# Patient Record
Sex: FEMALE | Race: WHITE | NOT HISPANIC OR LATINO | URBAN - METROPOLITAN AREA
[De-identification: names, ages, dates, MRNs, and addresses within clinical notes are randomized per-mention and may not be internally consistent; named-entity substitution may affect disease eponyms.]

---

## 2020-09-14 ENCOUNTER — OFFICE VISIT (OUTPATIENT)
Dept: URBAN - METROPOLITAN AREA SURGERY CENTER 4 | Facility: SURGERY CENTER | Age: 55
End: 2020-09-14

## 2020-09-15 ENCOUNTER — OFFICE VISIT (OUTPATIENT)
Dept: URBAN - METROPOLITAN AREA SURGERY CENTER 4 | Facility: SURGERY CENTER | Age: 55
End: 2020-09-15

## 2020-09-21 LAB — PATHOLOGY (INDENTED REPORT): (no result)

## 2020-10-01 ENCOUNTER — OFFICE VISIT (OUTPATIENT)
Dept: URBAN - METROPOLITAN AREA CLINIC 60 | Facility: CLINIC | Age: 55
End: 2020-10-01

## 2020-12-03 ENCOUNTER — NURSE TRIAGE (OUTPATIENT)
Dept: OTHER | Facility: OTHER | Age: 55
End: 2020-12-03

## 2021-01-18 ENCOUNTER — OFFICE VISIT (OUTPATIENT)
Dept: URGENT CARE | Facility: CLINIC | Age: 56
End: 2021-01-18

## 2021-01-18 VITALS
BODY MASS INDEX: 23.64 KG/M2 | TEMPERATURE: 98.8 F | RESPIRATION RATE: 16 BRPM | HEIGHT: 61 IN | OXYGEN SATURATION: 98 % | WEIGHT: 125.2 LBS | HEART RATE: 84 BPM | DIASTOLIC BLOOD PRESSURE: 72 MMHG | SYSTOLIC BLOOD PRESSURE: 100 MMHG

## 2021-01-18 DIAGNOSIS — R42 VERTIGO: Primary | ICD-10-CM

## 2021-01-18 PROCEDURE — G0382 LEV 3 HOSP TYPE B ED VISIT: HCPCS | Performed by: PHYSICIAN ASSISTANT

## 2021-01-18 RX ORDER — MECLIZINE HYDROCHLORIDE 25 MG/1
25 TABLET ORAL 3 TIMES DAILY PRN
Qty: 30 TABLET | Refills: 0 | Status: SHIPPED | OUTPATIENT
Start: 2021-01-18 | End: 2021-06-23

## 2021-01-18 RX ORDER — PANTOPRAZOLE SODIUM 40 MG/1
40 TABLET, DELAYED RELEASE ORAL DAILY
COMMUNITY
End: 2021-06-23

## 2021-01-18 NOTE — LETTER
January 18, 2021     Patient: Tamara Smith   YOB: 1965   Date of Visit: 1/18/2021       To Whom it May Concern: Chepe Link was seen in my clinic on 1/18/2021  She may return to work on 01/20/2021 or sooner if better  If you have any questions or concerns, please don't hesitate to call           Sincerely,          Farideh Maloney PA-C

## 2021-01-18 NOTE — PATIENT INSTRUCTIONS
Vertigo   AMBULATORY CARE:   Vertigo  is a condition that causes you to feel dizzy  You may feel that you or everything around you is moving or spinning  You may also feel like you are being pulled down or toward your side  Common symptoms that may happen with vertigo:   · Nausea or vomiting    · Trouble with your balance    · Sensitivity to light or sound    · Weakness, slurred speech, problems seeing or moving, or increased sleepiness    · Facial weakness and headache    · Hearing loss, ear fullness or pain, or hearing ringing sounds    · Fast, uncontrolled movement of your eyes    Seek care immediately if:   · You have a headache and a stiff neck  · You have shaking chills and a fever  · You vomit over and over with no relief  · You have blood, pus, or fluid coming out of your ears  · You are confused  Contact your healthcare provider if:   · Your symptoms do not get better with treatment  · You have questions about your condition or care  Treatment for vertigo  may include resting in bed or avoid certain activities for a time  You may need to decrease or stop taking medicines that are causing your vertigo  Medicines may also be prescribed to help relieve your symptoms  Manage your symptoms:   · Do not drive , walk without help, or operate heavy machinery when you are dizzy  · Move slowly  when you move from one position to another position  Get up slowly from sitting or lying down  Sit or lie down right away if you feel dizzy  · Drink plenty of liquids  Liquids help prevent dehydration  Ask how much liquid to drink each day and which liquids are best for you  · Vestibular and balance rehabilitation therapy (VBRT)  is used to teach you exercises to improve your balance and strength  These exercises may help decrease your vertigo and improve your balance  Ask for more information about this therapy      Follow up with your healthcare provider as directed:  Write down your questions so you remember to ask them during your visits  © Copyright [address] Information is for End User's use only and may not be sold, redistributed or otherwise used for commercial purposes  All illustrations and images included in CareNotes® are the copyrighted property of [employer] D [employer] M [employer]  or [employer]  The above information is an  only  It is not intended as medical advice for individual conditions or treatments  Talk to your doctor, nurse or pharmacist before following any medical regimen to see if it is safe and effective for you

## 2021-01-19 NOTE — PROGRESS NOTES
[facility]        NAME: Syd Wilson is a 60 y o  female  : 1965    MRN: [id number]  DATE: 2021  TIME: 6:05 AM    Assessment and Plan   Vertigo [R42]  1  Vertigo  meclizine (ANTIVERT) 25 mg tablet         Patient Instructions       Follow up with PCP in 3-5 days  Proceed to  ER if symptoms worsen  Chief Complaint     Chief Complaint   Patient presents with    Dizziness     onset 5 weeks, allergy symptoms sneezing- coughing  Cold sore, sore on tingue  Right ear has a buzzing  Friday woke up with vertigo feeling, fatigued, decreased energy, nausea  History of Present Illness         68-year-old female presents the clinic with dizziness, sneezing, coughing, so cold sore the side of nose and a buzzing sound to her right ear that started 5 weeks ago  Patient states that she recently moved from [place] and started a new job 5 weeks ago  Patient states she has been having some allergy symptoms and states that she was tested for COVID-19 which was negative  Patient states that she woke up Friday with dizziness and a vertigo feeling which she has had before  Patient is also had some fatigue and some mild intermittent nausea with her dizziness  Patient has taken over-the-counter allergy medicine  Review of Systems   Review of Systems   Constitutional: Positive for fatigue  Negative for chills and fever  HENT: Positive for ear pain (buzzing) and sneezing  Negative for congestion, rhinorrhea and sore throat  No loss of sense of smell or taste   Respiratory: Positive for cough  Negative for chest tightness, shortness of breath and wheezing  Cardiovascular: Negative for chest pain and palpitations  Gastrointestinal: Positive for nausea  Negative for abdominal pain, diarrhea and vomiting  Musculoskeletal: Negative for myalgias  Skin: Negative for rash  Neurological: Positive for dizziness   Negative for weakness, light-headedness, numbness and headaches  All other systems reviewed and are negative  Current Medications       Current Outpatient Medications:     pantoprazole (PROTONIX) 40 mg tablet, Take 40 mg by mouth daily, Disp: , Rfl:     meclizine (ANTIVERT) 25 mg tablet, Take 1 tablet (25 mg total) by mouth 3 (three) times a day as needed for dizziness or nausea, Disp: 30 tablet, Rfl: 0    Current Allergies     Allergies as of 01/18/2021    (No Known Allergies)            The following portions of the patient's history were reviewed and updated as appropriate: allergies, current medications, past family history, past medical history, past social history, past surgical history and problem list      Past Medical History:   Diagnosis Date    Roque's esophagus        Past Surgical History:   Procedure Laterality Date    GASTRIC BYPASS         History reviewed  No pertinent family history  Medications have been verified  Objective   /72   Pulse 84   Temp 98 8 °F (37 1 °C) (Temporal)   Resp 16   Ht 5' 1" (1 549 m)   Wt 56 8 kg (125 lb 3 2 oz)   SpO2 98%   BMI 23 66 kg/m²   No LMP recorded  Physical Exam     Physical Exam  Vitals signs and nursing note reviewed  Constitutional:       General: She is not in acute distress  Appearance: She is well-developed  She is not diaphoretic  HENT:      Head: Normocephalic and atraumatic  Right Ear: A middle ear effusion (  Mild) is present  Tympanic membrane is not erythematous  Left Ear: A middle ear effusion ( mild) is present  Tympanic membrane is not erythematous  Nose: Nose normal       Mouth/Throat:      Lips: Pink  Mouth: Mucous membranes are moist       Pharynx: Uvula midline  Eyes:      Conjunctiva/sclera: Conjunctivae normal    Neck:      Musculoskeletal: Normal range of motion  Cardiovascular:      Rate and Rhythm: Normal rate and regular rhythm  Heart sounds: Normal heart sounds     Pulmonary:      Effort: Pulmonary effort is normal       Breath sounds: Normal breath sounds  Musculoskeletal: Normal range of motion  Skin:     General: Skin is warm and dry  Capillary Refill: Capillary refill takes less than 2 seconds  Neurological:      General: No focal deficit present  Mental Status: She is alert and oriented to person, place, and time  GCS: GCS eye subscore is 4  GCS verbal subscore is 5  GCS motor subscore is 6  Cranial Nerves: Cranial nerves are intact  Sensory: Sensation is intact  Motor: Motor function is intact  Coordination: Coordination is intact  Gait: Gait is intact

## 2021-06-23 ENCOUNTER — OFFICE VISIT (OUTPATIENT)
Dept: FAMILY MEDICINE CLINIC | Facility: CLINIC | Age: 56
End: 2021-06-23
Payer: COMMERCIAL

## 2021-06-23 VITALS
HEIGHT: 61 IN | TEMPERATURE: 97.7 F | SYSTOLIC BLOOD PRESSURE: 112 MMHG | DIASTOLIC BLOOD PRESSURE: 70 MMHG | BODY MASS INDEX: 23.83 KG/M2 | OXYGEN SATURATION: 99 % | HEART RATE: 68 BPM | WEIGHT: 126.2 LBS

## 2021-06-23 DIAGNOSIS — Z13.220 LIPID SCREENING: ICD-10-CM

## 2021-06-23 DIAGNOSIS — Z12.31 SCREENING MAMMOGRAM, ENCOUNTER FOR: ICD-10-CM

## 2021-06-23 DIAGNOSIS — Z13.29 THYROID DISORDER SCREENING: ICD-10-CM

## 2021-06-23 DIAGNOSIS — Z87.19 HISTORY OF BARRETT'S ESOPHAGUS: ICD-10-CM

## 2021-06-23 DIAGNOSIS — Z13.228 ENCOUNTER FOR SCREENING FOR METABOLIC DISORDER: ICD-10-CM

## 2021-06-23 DIAGNOSIS — D50.9 IRON DEFICIENCY ANEMIA, UNSPECIFIED IRON DEFICIENCY ANEMIA TYPE: ICD-10-CM

## 2021-06-23 DIAGNOSIS — R53.83 FATIGUE, UNSPECIFIED TYPE: ICD-10-CM

## 2021-06-23 DIAGNOSIS — Z76.89 ENCOUNTER TO ESTABLISH CARE: Primary | ICD-10-CM

## 2021-06-23 PROCEDURE — 3725F SCREEN DEPRESSION PERFORMED: CPT | Performed by: NURSE PRACTITIONER

## 2021-06-23 PROCEDURE — 99203 OFFICE O/P NEW LOW 30 MIN: CPT | Performed by: NURSE PRACTITIONER

## 2021-06-23 RX ORDER — OMEPRAZOLE 40 MG/1
40 CAPSULE, DELAYED RELEASE ORAL DAILY
Qty: 90 CAPSULE | Refills: 1 | Status: SHIPPED | OUTPATIENT
Start: 2021-06-23 | End: 2022-01-28

## 2021-06-23 NOTE — PATIENT INSTRUCTIONS
Labs as ordered  Prilosec 40 mg daily as directed  Have labs done and schedule appointment for physical   Call with any questions/concerns

## 2021-06-23 NOTE — PROGRESS NOTES
[facility]        NAME: Mani Grande is a 60 y o  female  : 1965    MRN: [id number]  DATE: 2021  TIME: 3:50 PM    Assessment and Plan   Encounter to establish care [Z76 [id number]]  1  Encounter to establish care     2  Encounter for screening for metabolic disorder  Comprehensive metabolic panel    Comprehensive metabolic panel   3  Lipid screening  Lipid panel    Lipid panel   4  Thyroid disorder screening     5  Fatigue, unspecified type  CBC and differential    TSH, 3rd generation with Free T4 reflex    Iron    CBC and differential    TSH, 3rd generation with Free T4 reflex    Iron   6  Iron deficiency anemia, unspecified iron deficiency anemia type     7  Screening mammogram, encounter for  Mammo screening bilateral w 3d & cad   8  History of Roque's esophagus  omeprazole (PriLOSEC) 40 MG capsule         Patient Instructions     Patient Instructions   Labs as ordered  Prilosec 40 mg daily as directed  Have labs done and schedule appointment for physical   Call with any questions/concerns  Chief Complaint     Chief Complaint   Patient presents with    New Patient Visit     establish care & multi complaints         History of Present Illness       New patient to establish care  Moved from [place]  Hx of gastric bypass, janett's esophagus-seen by GI in [place] had endoscopy within last year, takes prilosec needs refill  C/o feeling fatigue, hx of anemia, has received iron infusions in the past  Family hx of thyroid disease  Review of Systems   Review of Systems   Constitutional: Positive for fatigue  Negative for activity change, appetite change, chills, diaphoresis and fever  HENT: Negative for congestion, facial swelling, hearing loss, rhinorrhea, sinus pressure, sinus pain, sneezing, sore throat and voice change  Eyes: Negative for discharge and visual disturbance     Respiratory: Negative for cough, choking, chest tightness, shortness of breath, wheezing and stridor  Cardiovascular: Negative for chest pain, palpitations and leg swelling  Gastrointestinal: Negative for abdominal distention, abdominal pain, blood in stool, constipation, diarrhea, nausea and vomiting  Endocrine: Negative for cold intolerance, polydipsia, polyphagia and polyuria  Genitourinary: Negative for difficulty urinating, dysuria, frequency and urgency  Musculoskeletal: Negative for arthralgias, back pain, gait problem, joint swelling, myalgias, neck pain and neck stiffness  Skin: Negative for color change, pallor, rash and wound  Neurological: Negative for dizziness, tremors, seizures, syncope, facial asymmetry, speech difficulty, weakness, light-headedness, numbness and headaches  Hematological: Negative for adenopathy  Does not bruise/bleed easily  Psychiatric/Behavioral: Negative for agitation, behavioral problems, confusion, decreased concentration, dysphoric mood, hallucinations, self-injury, sleep disturbance and suicidal ideas  The patient is not nervous/anxious and is not hyperactive            Current Medications       Current Outpatient Medications:     omeprazole (PriLOSEC) 40 MG capsule, Take 1 capsule (40 mg total) by mouth daily, Disp: 90 capsule, Rfl: 1    Current Allergies     Allergies as of 06/23/2021    (No Known Allergies)            The following portions of the patient's history were reviewed and updated as appropriate: allergies, current medications, past family history, past medical history, past social history, past surgical history and problem list      Past Medical History:   Diagnosis Date    Roque's esophagus        Past Surgical History:   Procedure Laterality Date    CHOLECYSTECTOMY      COLON SURGERY      GASTRIC BYPASS      GASTROSTOMY      HERNIA REPAIR      TUBAL LIGATION         Family History   Problem Relation Age of Onset    Heart disease Mother     Diabetes Sister     Thyroid cancer Son     Heart disease Maternal Grandmother     Diabetes Maternal Grandmother     Stroke Maternal Grandmother     ALS Maternal Grandfather     ALS Maternal Aunt     ALS Cousin     Thyroid disease Sister          Medications have been verified  Objective   /70 (BP Location: Left arm, Patient Position: Sitting, Cuff Size: Standard)   Pulse 68   Temp 97 7 °F (36 5 °C) (Oral)   Ht 5' 1" (1 549 m)   Wt 57 2 kg (126 lb 3 2 oz)   SpO2 99%   BMI 23 85 kg/m²        Physical Exam     Physical Exam  Vitals and nursing note reviewed  Constitutional:       General: She is not in acute distress  Appearance: Normal appearance  She is well-developed  She is not ill-appearing or diaphoretic  Neck:      Thyroid: No thyromegaly  Trachea: No tracheal deviation  Cardiovascular:      Rate and Rhythm: Normal rate and regular rhythm  Heart sounds: Normal heart sounds  No murmur heard  No friction rub  No gallop  Pulmonary:      Effort: Pulmonary effort is normal  No respiratory distress  Breath sounds: Normal breath sounds  No wheezing  Musculoskeletal:         General: No swelling, tenderness or deformity  Normal range of motion  Cervical back: Normal range of motion  Skin:     General: Skin is warm and dry  Findings: No erythema or rash  Neurological:      General: No focal deficit present  Mental Status: She is alert and oriented to person, place, and time  Psychiatric:         Mood and Affect: Mood normal          Behavior: Behavior normal  Behavior is cooperative  Thought Content:  Thought content normal          Judgment: Judgment normal            PHQ-9 Depression Screening    PHQ-9:   Frequency of the following problems over the past two weeks:      Little interest or pleasure in doing things: 0 - not at all  Feeling down, depressed, or hopeless: 0 - not at all  PHQ-2 Score: 0

## 2021-06-25 DIAGNOSIS — D50.9 IRON DEFICIENCY ANEMIA, UNSPECIFIED IRON DEFICIENCY ANEMIA TYPE: Primary | ICD-10-CM

## 2021-06-25 LAB
ALBUMIN SERPL-MCNC: 4.1 G/DL (ref 3.8–4.9)
ALBUMIN/GLOB SERPL: 1.6 {RATIO} (ref 1.2–2.2)
ALP SERPL-CCNC: 119 IU/L (ref 48–121)
ALT SERPL-CCNC: 21 IU/L (ref 0–32)
AST SERPL-CCNC: 29 IU/L (ref 0–40)
BASOPHILS # BLD AUTO: 0.1 X10E3/UL (ref 0–0.2)
BASOPHILS NFR BLD AUTO: 2 %
BILIRUB SERPL-MCNC: 0.3 MG/DL (ref 0–1.2)
BUN SERPL-MCNC: 18 MG/DL (ref 6–24)
BUN/CREAT SERPL: 25 (ref 9–23)
CALCIUM SERPL-MCNC: 9.2 MG/DL (ref 8.7–10.2)
CHLORIDE SERPL-SCNC: 104 MMOL/L (ref 96–106)
CHOLEST SERPL-MCNC: 162 MG/DL (ref 100–199)
CHOLEST/HDLC SERPL: 1.8 RATIO (ref 0–4.4)
CO2 SERPL-SCNC: 25 MMOL/L (ref 20–29)
CREAT SERPL-MCNC: 0.73 MG/DL (ref 0.57–1)
EOSINOPHIL # BLD AUTO: 0.4 X10E3/UL (ref 0–0.4)
EOSINOPHIL NFR BLD AUTO: 6 %
ERYTHROCYTE [DISTWIDTH] IN BLOOD BY AUTOMATED COUNT: 16.4 % (ref 11.7–15.4)
GLOBULIN SER-MCNC: 2.5 G/DL (ref 1.5–4.5)
GLUCOSE SERPL-MCNC: 87 MG/DL (ref 65–99)
HCT VFR BLD AUTO: 31.9 % (ref 34–46.6)
HDLC SERPL-MCNC: 89 MG/DL
HGB BLD-MCNC: 9 G/DL (ref 11.1–15.9)
IMM GRANULOCYTES # BLD: 0 X10E3/UL (ref 0–0.1)
IMM GRANULOCYTES NFR BLD: 0 %
IRON SERPL-MCNC: 19 UG/DL (ref 27–159)
LDLC SERPL CALC-MCNC: 63 MG/DL (ref 0–99)
LYMPHOCYTES # BLD AUTO: 2.4 X10E3/UL (ref 0.7–3.1)
LYMPHOCYTES NFR BLD AUTO: 36 %
MCH RBC QN AUTO: 19.5 PG (ref 26.6–33)
MCHC RBC AUTO-ENTMCNC: 28.2 G/DL (ref 31.5–35.7)
MCV RBC AUTO: 69 FL (ref 79–97)
MONOCYTES # BLD AUTO: 0.7 X10E3/UL (ref 0.1–0.9)
MONOCYTES NFR BLD AUTO: 10 %
NEUTROPHILS # BLD AUTO: 3.1 X10E3/UL (ref 1.4–7)
NEUTROPHILS NFR BLD AUTO: 46 %
PLATELET # BLD AUTO: 373 X10E3/UL (ref 150–450)
POTASSIUM SERPL-SCNC: 4.1 MMOL/L (ref 3.5–5.2)
PROT SERPL-MCNC: 6.6 G/DL (ref 6–8.5)
RBC # BLD AUTO: 4.61 X10E6/UL (ref 3.77–5.28)
SL AMB EGFR AFRICAN AMERICAN: 105 ML/MIN/1.73
SL AMB EGFR NON AFRICAN AMERICAN: 91 ML/MIN/1.73
SL AMB VLDL CHOLESTEROL CALC: 10 MG/DL (ref 5–40)
SODIUM SERPL-SCNC: 141 MMOL/L (ref 134–144)
TRIGL SERPL-MCNC: 47 MG/DL (ref 0–149)
TSH SERPL DL<=0.005 MIU/L-ACNC: 1.5 UIU/ML (ref 0.45–4.5)
WBC # BLD AUTO: 6.7 X10E3/UL (ref 3.4–10.8)

## 2021-06-28 ENCOUNTER — TELEPHONE (OUTPATIENT)
Dept: FAMILY MEDICINE CLINIC | Facility: CLINIC | Age: 56
End: 2021-06-28

## 2021-06-28 NOTE — TELEPHONE ENCOUNTER
----- Message from [address] [place] [address],[address] sent at 6/25/2021  1:05 PM EDT -----  Call with labs  Low iron- start taking OTC Ferrous Sulfate 324 mg daily  Iron enriched diet  Repeat labs in 4-6 months   Labs ordered

## 2021-07-09 ENCOUNTER — OFFICE VISIT (OUTPATIENT)
Dept: FAMILY MEDICINE CLINIC | Facility: CLINIC | Age: 56
End: 2021-07-09
Payer: COMMERCIAL

## 2021-07-09 VITALS
WEIGHT: 121 LBS | SYSTOLIC BLOOD PRESSURE: 102 MMHG | DIASTOLIC BLOOD PRESSURE: 70 MMHG | BODY MASS INDEX: 22.84 KG/M2 | HEIGHT: 61 IN

## 2021-07-09 DIAGNOSIS — Z00.00 ANNUAL PHYSICAL EXAM: Primary | ICD-10-CM

## 2021-07-09 DIAGNOSIS — R25.2 LEG CRAMPING: ICD-10-CM

## 2021-07-09 DIAGNOSIS — D50.9 IRON DEFICIENCY ANEMIA, UNSPECIFIED IRON DEFICIENCY ANEMIA TYPE: ICD-10-CM

## 2021-07-09 DIAGNOSIS — R53.83 FATIGUE, UNSPECIFIED TYPE: ICD-10-CM

## 2021-07-09 PROCEDURE — 99213 OFFICE O/P EST LOW 20 MIN: CPT | Performed by: NURSE PRACTITIONER

## 2021-07-09 PROCEDURE — 3008F BODY MASS INDEX DOCD: CPT | Performed by: NURSE PRACTITIONER

## 2021-07-09 PROCEDURE — 99396 PREV VISIT EST AGE 40-64: CPT | Performed by: NURSE PRACTITIONER

## 2021-07-09 PROCEDURE — 1036F TOBACCO NON-USER: CPT | Performed by: NURSE PRACTITIONER

## 2021-07-09 RX ORDER — MULTIVITAMIN
TABLET ORAL
COMMUNITY

## 2021-07-09 NOTE — PATIENT INSTRUCTIONS
Continue taking iron supplements as directed  Repeat Iron and H/H in 3-6 months  Call with any problems or concerns  Wellness Visit for Adults   AMBULATORY CARE:   A wellness visit  is when you see your healthcare provider to get screened for health problems  Your healthcare provider will also give you advice on how to stay healthy  Write down your questions so you remember to ask them  Ask your healthcare provider how often you should have a wellness visit  What happens at a wellness visit:  Your healthcare provider will ask about your health, and your family history of health problems  This includes high blood pressure, heart disease, and cancer  He or she will ask if you have symptoms that concern you, if you smoke, and about your mood  You may also be asked about your intake of medicines, supplements, food, and alcohol  Any of the following may be done:  · Your weight  will be checked  Your height may also be checked so your body mass index (BMI) can be calculated  Your BMI shows if you are at a healthy weight  · Your blood pressure  and heart rate will be checked  Your temperature may also be checked  · Blood and urine tests  may be done  Blood tests may be done to check your cholesterol levels  Abnormal cholesterol levels increase your risk for heart disease and stroke  You may also need a blood or urine test to check for diabetes if you are at increased risk  Urine tests may be done to look for signs of an infection or kidney disease  · A physical exam  includes checking your heartbeat and lungs with a stethoscope  Your healthcare provider may also check your skin to look for sun damage  · Screening tests  may be recommended  A screening test is done to check for diseases that may not cause symptoms  The screening tests you may need depend on your age, gender, family history, and lifestyle habits   For example, colorectal screening may be recommended if you are 48years old or older     Screening tests you need if you are a woman:   · A Pap smear  is used to screen for cervical cancer  Pap smears are usually done every 3 to 5 years depending on your age  You may need them more often if you have had abnormal Pap smear test results in the past  Ask your healthcare provider how often you should have a Pap smear  · A mammogram  is an x-ray of your breasts to screen for breast cancer  Experts recommend mammograms every 2 years starting at age 48 years  You may need a mammogram at age 52 years or younger if you have an increased risk for breast cancer  Talk to your healthcare provider about when you should start having mammograms and how often you need them  Vaccines you may need:   · Get an influenza vaccine  every year  The influenza vaccine protects you from the flu  Several types of viruses cause the flu  The viruses change over time, so new vaccines are made each year  · Get a tetanus-diphtheria (Td) booster vaccine  every 10 years  This vaccine protects you against tetanus and diphtheria  Tetanus is a severe infection that may cause painful muscle spasms and lockjaw  Diphtheria is a severe bacterial infection that causes a thick covering in the back of your mouth and throat  · Get a human papillomavirus (HPV) vaccine  if you are female and aged 23 to 32 or male 23 to 24 and never received it  This vaccine protects you from HPV infection  HPV is the most common infection spread by sexual contact  HPV may also cause vaginal, penile, and anal cancers  · Get a pneumococcal vaccine  if you are aged 72 years or older  The pneumococcal vaccine is an injection given to protect you from pneumococcal disease  Pneumococcal disease is an infection caused by pneumococcal bacteria  The infection may cause pneumonia, meningitis, or an ear infection  · Get a shingles vaccine  if you are 60 or older, even if you have had shingles before   The shingles vaccine is an injection to protect you from the varicella-zoster virus  This is the same virus that causes chickenpox  Shingles is a painful rash that develops in people who had chickenpox or have been exposed to the virus  How to eat healthy:  My Plate is a model for planning healthy meals  It shows the types and amounts of foods that should go on your plate  Fruits and vegetables make up about half of your plate, and grains and protein make up the other half  A serving of dairy is included on the side of your plate  The amount of calories and serving sizes you need depends on your age, gender, weight, and height  Examples of healthy foods are listed below:  · Eat a variety of vegetables  such as dark green, red, and orange vegetables  You can also include canned vegetables low in sodium (salt) and frozen vegetables without added butter or sauces  · Eat a variety of fresh fruits , canned fruit in 100% juice, frozen fruit, and dried fruit  · Include whole grains  At least half of the grains you eat should be whole grains  Examples include whole-wheat bread, wheat pasta, brown rice, and whole-grain cereals such as oatmeal     · Eat a variety of protein foods such as seafood (fish and shellfish), lean meat, and poultry without skin (turkey and chicken)  Examples of lean meats include pork leg, shoulder, or tenderloin, and beef round, sirloin, tenderloin, and extra lean ground beef  Other protein foods include eggs and egg substitutes, beans, peas, soy products, nuts, and seeds  · Choose low-fat dairy products such as skim or 1% milk or low-fat yogurt, cheese, and cottage cheese  · Limit unhealthy fats  such as butter, hard margarine, and shortening  Exercise:  Exercise at least 30 minutes per day on most days of the week  Some examples of exercise include walking, biking, dancing, and swimming  You can also fit in more physical activity by taking the stairs instead of the elevator or parking farther away from stores   Include muscle strengthening activities 2 days each week  Regular exercise provides many health benefits  It helps you manage your weight, and decreases your risk for type 2 diabetes, heart disease, stroke, and high blood pressure  Exercise can also help improve your mood  Ask your healthcare provider about the best exercise plan for you  General health and safety guidelines:   · Do not smoke  Nicotine and other chemicals in cigarettes and cigars can cause lung damage  Ask your healthcare provider for information if you currently smoke and need help to quit  E-cigarettes or smokeless tobacco still contain nicotine  Talk to your healthcare provider before you use these products  · Limit alcohol  A drink of alcohol is 12 ounces of beer, 5 ounces of wine, or 1½ ounces of liquor  · Lose weight, if needed  Being overweight increases your risk of certain health conditions  These include heart disease, high blood pressure, type 2 diabetes, and certain types of cancer  · Protect your skin  Do not sunbathe or use tanning beds  Use sunscreen with a SPF 15 or higher  Apply sunscreen at least 15 minutes before you go outside  Reapply sunscreen every 2 hours  Wear protective clothing, hats, and sunglasses when you are outside  · Drive safely  Always wear your seatbelt  Make sure everyone in your car wears a seatbelt  A seatbelt can save your life if you are in an accident  Do not use your cell phone when you are driving  This could distract you and cause an accident  Pull over if you need to make a call or send a text message  · Practice safe sex  Use latex condoms if are sexually active and have more than one partner  Your healthcare provider may recommend screening tests for sexually transmitted infections (STIs)  · Wear helmets, lifejackets, and protective gear  Always wear a helmet when you ride a bike or motorcycle, go skiing, or play sports that could cause a head injury   Wear protective equipment when you play sports  Wear a lifejacket when you are on a boat or doing water sports  © Copyright [address] Information is for End User's use only and may not be sold, redistributed or otherwise used for commercial purposes  All illustrations and images included in CareNotes® are the copyrighted property of A D A M , Inc  or [employer]   The above information is an  only  It is not intended as medical advice for individual conditions or treatments  Talk to your doctor, nurse or pharmacist before following any medical regimen to see if it is safe and effective for you

## 2021-07-09 NOTE — PROGRESS NOTES
[facility]        NAME: Smita Miller is a 60 y o  female  : 1965    MRN: [id number]  DATE: 2021  TIME: 11:35 AM    Assessment and Plan   Annual physical exam [C28 01]  0  Annual physical exam     2  Fatigue, unspecified type     3  Leg cramping     4  Iron deficiency anemia, unspecified iron deficiency anemia type           Patient Instructions     Patient Instructions     Continue taking iron supplements as directed  Repeat Iron and H/H in 3-6 months  Call with any problems or concerns  Wellness Visit for Adults   AMBULATORY CARE:   A wellness visit  is when you see your healthcare provider to get screened for health problems  Your healthcare provider will also give you advice on how to stay healthy  Write down your questions so you remember to ask them  Ask your healthcare provider how often you should have a wellness visit  What happens at a wellness visit:  Your healthcare provider will ask about your health, and your family history of health problems  This includes high blood pressure, heart disease, and cancer  He or she will ask if you have symptoms that concern you, if you smoke, and about your mood  You may also be asked about your intake of medicines, supplements, food, and alcohol  Any of the following may be done:  · Your weight  will be checked  Your height may also be checked so your body mass index (BMI) can be calculated  Your BMI shows if you are at a healthy weight  · Your blood pressure  and heart rate will be checked  Your temperature may also be checked  · Blood and urine tests  may be done  Blood tests may be done to check your cholesterol levels  Abnormal cholesterol levels increase your risk for heart disease and stroke  You may also need a blood or urine test to check for diabetes if you are at increased risk  Urine tests may be done to look for signs of an infection or kidney disease      · A physical exam  includes checking your heartbeat and lungs with a stethoscope  Your healthcare provider may also check your skin to look for sun damage  · Screening tests  may be recommended  A screening test is done to check for diseases that may not cause symptoms  The screening tests you may need depend on your age, gender, family history, and lifestyle habits  For example, colorectal screening may be recommended if you are 48years old or older  Screening tests you need if you are a woman:   · A Pap smear  is used to screen for cervical cancer  Pap smears are usually done every 3 to 5 years depending on your age  You may need them more often if you have had abnormal Pap smear test results in the past  Ask your healthcare provider how often you should have a Pap smear  · A mammogram  is an x-ray of your breasts to screen for breast cancer  Experts recommend mammograms every 2 years starting at age 48 years  You may need a mammogram at age 52 years or younger if you have an increased risk for breast cancer  Talk to your healthcare provider about when you should start having mammograms and how often you need them  Vaccines you may need:   · Get an influenza vaccine  every year  The influenza vaccine protects you from the flu  Several types of viruses cause the flu  The viruses change over time, so new vaccines are made each year  · Get a tetanus-diphtheria (Td) booster vaccine  every 10 years  This vaccine protects you against tetanus and diphtheria  Tetanus is a severe infection that may cause painful muscle spasms and lockjaw  Diphtheria is a severe bacterial infection that causes a thick covering in the back of your mouth and throat  · Get a human papillomavirus (HPV) vaccine  if you are female and aged 23 to 32 or male 23 to 24 and never received it  This vaccine protects you from HPV infection  HPV is the most common infection spread by sexual contact  HPV may also cause vaginal, penile, and anal cancers      · Get a pneumococcal vaccine  if you are aged 72 years or older  The pneumococcal vaccine is an injection given to protect you from pneumococcal disease  Pneumococcal disease is an infection caused by pneumococcal bacteria  The infection may cause pneumonia, meningitis, or an ear infection  · Get a shingles vaccine  if you are 60 or older, even if you have had shingles before  The shingles vaccine is an injection to protect you from the varicella-zoster virus  This is the same virus that causes chickenpox  Shingles is a painful rash that develops in people who had chickenpox or have been exposed to the virus  How to eat healthy:  My Plate is a model for planning healthy meals  It shows the types and amounts of foods that should go on your plate  Fruits and vegetables make up about half of your plate, and grains and protein make up the other half  A serving of dairy is included on the side of your plate  The amount of calories and serving sizes you need depends on your age, gender, weight, and height  Examples of healthy foods are listed below:  · Eat a variety of vegetables  such as dark green, red, and orange vegetables  You can also include canned vegetables low in sodium (salt) and frozen vegetables without added butter or sauces  · Eat a variety of fresh fruits , canned fruit in 100% juice, frozen fruit, and dried fruit  · Include whole grains  At least half of the grains you eat should be whole grains  Examples include whole-wheat bread, wheat pasta, brown rice, and whole-grain cereals such as oatmeal     · Eat a variety of protein foods such as seafood (fish and shellfish), lean meat, and poultry without skin (turkey and chicken)  Examples of lean meats include pork leg, shoulder, or tenderloin, and beef round, sirloin, tenderloin, and extra lean ground beef  Other protein foods include eggs and egg substitutes, beans, peas, soy products, nuts, and seeds      · Choose low-fat dairy products such as skim or 1% milk or low-fat yogurt, cheese, and cottage cheese  · Limit unhealthy fats  such as butter, hard margarine, and shortening  Exercise:  Exercise at least 30 minutes per day on most days of the week  Some examples of exercise include walking, biking, dancing, and swimming  You can also fit in more physical activity by taking the stairs instead of the elevator or parking farther away from stores  Include muscle strengthening activities 2 days each week  Regular exercise provides many health benefits  It helps you manage your weight, and decreases your risk for type 2 diabetes, heart disease, stroke, and high blood pressure  Exercise can also help improve your mood  Ask your healthcare provider about the best exercise plan for you  General health and safety guidelines:   · Do not smoke  Nicotine and other chemicals in cigarettes and cigars can cause lung damage  Ask your healthcare provider for information if you currently smoke and need help to quit  E-cigarettes or smokeless tobacco still contain nicotine  Talk to your healthcare provider before you use these products  · Limit alcohol  A drink of alcohol is 12 ounces of beer, 5 ounces of wine, or 1½ ounces of liquor  · Lose weight, if needed  Being overweight increases your risk of certain health conditions  These include heart disease, high blood pressure, type 2 diabetes, and certain types of cancer  · Protect your skin  Do not sunbathe or use tanning beds  Use sunscreen with a SPF 15 or higher  Apply sunscreen at least 15 minutes before you go outside  Reapply sunscreen every 2 hours  Wear protective clothing, hats, and sunglasses when you are outside  · Drive safely  Always wear your seatbelt  Make sure everyone in your car wears a seatbelt  A seatbelt can save your life if you are in an accident  Do not use your cell phone when you are driving  This could distract you and cause an accident   Pull over if you need to make a call or send a text message  · Practice safe sex  Use latex condoms if are sexually active and have more than one partner  Your healthcare provider may recommend screening tests for sexually transmitted infections (STIs)  · Wear helmets, lifejackets, and protective gear  Always wear a helmet when you ride a bike or motorcycle, go skiing, or play sports that could cause a head injury  Wear protective equipment when you play sports  Wear a lifejacket when you are on a boat or doing water sports  © Copyright [address] Information is for End User's use only and may not be sold, redistributed or otherwise used for commercial purposes  All illustrations and images included in CareNotes® are the copyrighted property of A D [employer] M [employer] Inc  or [employer]   The above information is an  only  It is not intended as medical advice for individual conditions or treatments  Talk to your doctor, nurse or pharmacist before following any medical regimen to see if it is safe and effective for you  Chief Complaint     Chief Complaint   Patient presents with    Follow-up     pt is here for 3 weeks f/u on BW  pt states she ahd her colonoscopy few years ago in [place]  [facility]          History of Present Illness       Here to review labs  C/o fatigue and leg cramps  She said she could tell her iron was low due to symptoms  H/H 9/31 9 and Iron 19 on 6/24/21  Hx of iron deficient anemia  Patient started taking Iron supplements 10 days ago  She has needed iron infusions in the past        Review of Systems   Review of Systems   Constitutional: Positive for fatigue  Negative for activity change, appetite change, chills, diaphoresis and fever  HENT: Negative  Respiratory: Negative for chest tightness and shortness of breath  Cardiovascular: Negative for chest pain, palpitations and leg swelling  Gastrointestinal: Negative for abdominal pain     Musculoskeletal: Positive for myalgias  Negative for arthralgias and joint swelling  Skin: Negative for color change  Neurological: Negative for weakness and numbness  Psychiatric/Behavioral: Negative  Current Medications       Current Outpatient Medications:     Multiple Vitamin tablet, Take by mouth, Disp: , Rfl:     omeprazole (PriLOSEC) 40 MG capsule, Take 1 capsule (40 mg total) by mouth daily, Disp: 90 capsule, Rfl: 1    Current Allergies     Allergies as of 07/09/2021    (No Known Allergies)            The following portions of the patient's history were reviewed and updated as appropriate: allergies, current medications, past family history, past medical history, past social history, past surgical history and problem list      Past Medical History:   Diagnosis Date    Roque's esophagus        Past Surgical History:   Procedure Laterality Date    CHOLECYSTECTOMY      COLON SURGERY      GASTRIC BYPASS      GASTROSTOMY      HERNIA REPAIR      TUBAL LIGATION         Family History   Problem Relation Age of Onset    Heart disease Mother     Diabetes Sister     Thyroid cancer Son     Heart disease Maternal Grandmother     Diabetes Maternal Grandmother     Stroke Maternal Grandmother     ALS Maternal Grandfather     ALS Maternal Aunt     ALS Cousin     Thyroid disease Sister          Medications have been verified  Objective   /70   Ht 5' 1" (1 549 m)   Wt 54 9 kg (121 lb)   BMI 22 86 kg/m²        Physical Exam     Physical Exam  Vitals and nursing note reviewed  Constitutional:       General: She is not in acute distress  Appearance: Normal appearance  She is well-developed  She is not ill-appearing or diaphoretic  Neck:      Thyroid: No thyromegaly  Vascular: No carotid bruit  Trachea: No tracheal deviation  Cardiovascular:      Rate and Rhythm: Normal rate and regular rhythm  Heart sounds: Normal heart sounds  No murmur heard  No friction rub   No gallop  Pulmonary:      Effort: Pulmonary effort is normal  No respiratory distress  Breath sounds: Normal breath sounds  No wheezing  Musculoskeletal:         General: No swelling, tenderness or deformity  Normal range of motion  Cervical back: Normal range of motion and neck supple  No rigidity or tenderness  Lymphadenopathy:      Cervical: No cervical adenopathy  Skin:     General: Skin is warm and dry  Coloration: Skin is not pale  Findings: No erythema or rash  Neurological:      Mental Status: She is alert and oriented to person, place, and time  Psychiatric:         Mood and Affect: Mood normal          Behavior: Behavior normal  Behavior is cooperative  Thought Content:  Thought content normal          Judgment: Judgment normal

## 2021-07-09 NOTE — PROGRESS NOTES
[facility]    NAME: Antoine Rogers  AGE: 60 y o  SEX: female  : 1965     DATE: 2021     Assessment and Plan:     Problem List Items Addressed This Visit     None      Visit Diagnoses     Annual physical exam    -  Primary          Immunizations and preventive care screenings were discussed with patient today  Appropriate education was printed on patient's after visit summary  Counseling:  Alcohol/drug use: discussed moderation in alcohol intake, the recommendations for healthy alcohol use, and avoidance of illicit drug use  Dental Health: discussed importance of regular tooth brushing, flossing, and dental visits  Injury prevention: discussed safety/seat belts, safety helmets, smoke detectors, carbon dioxide detectors, and smoking near bedding or upholstery  Sexual health: discussed sexually transmitted diseases, partner selection, use of condoms, avoidance of unintended pregnancy, and contraceptive alternatives  · Exercise: the importance of regular exercise/physical activity was discussed  Recommend exercise 3-5 times per week for at least 30 minutes  No follow-ups on file  Chief Complaint:     Chief Complaint   Patient presents with    Follow-up     pt is here for 3 weeks f/u on BW  pt states she ahd her colonoscopy few years ago in [place]  [facility]       History of Present Illness:     Adult Annual Physical   Patient here for a comprehensive physical exam  The patient reports problems - fatigue, muscle cramps (legs)  Diet and Physical Activity  · Diet/Nutrition: well balanced diet  · Exercise: no formal exercise and works at [employer] constantly walking and [address] [place] sometimes get 10 miles while at work         Depression Screening  PHQ-9 Depression Screening    PHQ-9:   Frequency of the following problems over the past two weeks: General Health  · Sleep: sleeps well  · Hearing: normal - bilateral   · Vision: goes for regular eye exams  · Dental: regular dental visits  /GYN Health  · Patient is: postmenopausal  · Last menstrual period:      Review of Systems:     Review of Systems   Constitutional: Positive for fatigue  Negative for activity change, appetite change, chills, diaphoresis and fever  HENT: Negative for congestion, ear pain, facial swelling, sinus pressure, sinus pain, sore throat and trouble swallowing  Eyes: Negative for pain, discharge and visual disturbance  Respiratory: Negative for cough, chest tightness, shortness of breath and wheezing  Cardiovascular: Negative for chest pain, palpitations and leg swelling  Gastrointestinal: Negative for abdominal distention, abdominal pain, anal bleeding, blood in stool, constipation, diarrhea, nausea, rectal pain and vomiting  Endocrine: Negative for cold intolerance, heat intolerance, polydipsia, polyphagia and polyuria  Genitourinary: Negative for difficulty urinating, dysuria, flank pain, frequency, pelvic pain, urgency and vaginal pain  Musculoskeletal: Positive for myalgias (leg cramps)  Negative for arthralgias, back pain, gait problem, joint swelling, neck pain and neck stiffness  Skin: Negative for color change, pallor, rash and wound  Neurological: Negative for dizziness, tremors, seizures, syncope, facial asymmetry, speech difficulty, weakness, light-headedness, numbness and headaches  Hematological: Negative for adenopathy  Does not bruise/bleed easily  Psychiatric/Behavioral: Negative for agitation, behavioral problems, confusion, decreased concentration, dysphoric mood, hallucinations, self-injury, sleep disturbance and suicidal ideas  The patient is not nervous/anxious         Past Medical History:     Past Medical History:   Diagnosis Date    Roque's esophagus       Past Surgical History:     Past Surgical History:   Procedure Laterality Date    CHOLECYSTECTOMY      COLON SURGERY      GASTRIC BYPASS      GASTROSTOMY      HERNIA REPAIR      TUBAL LIGATION        Social History:     Social History     Socioeconomic History    Marital status: /Civil Union     Spouse name: Tate Brasher Number of children: 3    Years of education: None    Highest education level: None   Occupational History    Occupation:    Tobacco Use    Smoking status: Never Smoker    Smokeless tobacco: Never Used   Vaping Use    Vaping Use: Never used   Substance and Sexual Activity    Alcohol use: Not Currently    Drug use: Not Currently    Sexual activity: None   Other Topics Concern    None   Social History Narrative    None     Social Determinants of Health     Financial Resource Strain:     Difficulty of Paying Living Expenses:    Food Insecurity:     Worried About Running Out of Food in the Last Year:     Ran Out of Food in the Last Year:    Transportation Needs:     Lack of Transportation (Medical):      Lack of Transportation (Non-Medical):    Physical Activity:     Days of Exercise per Week:     Minutes of Exercise per Session:    Stress:     Feeling of Stress :    Social Connections:     Frequency of Communication with Friends and Family:     Frequency of Social Gatherings with Friends and Family:     Attends Gnosticist Services:     Active Member of Clubs or Organizations:     Attends Club or Organization Meetings:     Marital Status:    Intimate Partner Violence:     Fear of Current or Ex-Partner:     Emotionally Abused:     Physically Abused:     Sexually Abused:       Family History:     Family History   Problem Relation Age of Onset    Heart disease Mother     Diabetes Sister     Thyroid cancer Son     Heart disease Maternal Grandmother     Diabetes Maternal Grandmother     Stroke Maternal Grandmother     ALS Maternal Grandfather     ALS Maternal Aunt     ALS Cousin     Thyroid disease Sister Current Medications:     Current Outpatient Medications   Medication Sig Dispense Refill    Multiple Vitamin tablet Take by mouth      omeprazole (PriLOSEC) 40 MG capsule Take 1 capsule (40 mg total) by mouth daily 90 capsule 1     No current facility-administered medications for this visit  Allergies:     No Known Allergies   Physical Exam:     /70   Ht 5' 1" (1 549 m)   Wt 54 9 kg (121 lb)   BMI 22 86 kg/m²     Physical Exam  Vitals and nursing note reviewed  Constitutional:       General: She is not in acute distress  Appearance: Normal appearance  She is normal weight  She is not ill-appearing, toxic-appearing or diaphoretic  HENT:      Head: Normocephalic  Right Ear: Tympanic membrane, ear canal and external ear normal  There is no impacted cerumen  Left Ear: Tympanic membrane, ear canal and external ear normal  There is no impacted cerumen  Nose: Nose normal  No congestion or rhinorrhea  Mouth/Throat:      Mouth: Mucous membranes are moist       Pharynx: Oropharynx is clear  No oropharyngeal exudate or posterior oropharyngeal erythema  Eyes:      General: No scleral icterus  Right eye: No discharge  Left eye: No discharge  Extraocular Movements: Extraocular movements intact  Conjunctiva/sclera: Conjunctivae normal       Pupils: Pupils are equal, round, and reactive to light  Neck:      Vascular: No carotid bruit  Cardiovascular:      Rate and Rhythm: Normal rate and regular rhythm  Pulses: Normal pulses  Heart sounds: Normal heart sounds  No murmur heard  No friction rub  No gallop  Pulmonary:      Effort: Pulmonary effort is normal  No respiratory distress  Breath sounds: Normal breath sounds  No stridor  No wheezing, rhonchi or rales  Chest:      Chest wall: No tenderness  Abdominal:      General: Abdomen is flat  Bowel sounds are normal  There is no distension  Palpations: Abdomen is soft   There is no mass       Tenderness: There is no abdominal tenderness  There is no right CVA tenderness, left CVA tenderness, guarding or rebound  Hernia: No hernia is present  Musculoskeletal:         General: No swelling, tenderness, deformity or signs of injury  Normal range of motion  Cervical back: Normal range of motion and neck supple  No rigidity or tenderness  No muscular tenderness  Right lower leg: No edema  Left lower leg: No edema  Lymphadenopathy:      Cervical: No cervical adenopathy  Skin:     General: Skin is warm  Coloration: Skin is not jaundiced or pale  Findings: No bruising, erythema, lesion or rash  Neurological:      General: No focal deficit present  Mental Status: She is alert and oriented to person, place, and time  Cranial Nerves: No cranial nerve deficit  Sensory: No sensory deficit  Motor: No weakness  Coordination: Coordination normal       Gait: Gait normal       Deep Tendon Reflexes: Reflexes normal    Psychiatric:         Mood and Affect: Mood normal          Behavior: Behavior normal          Thought Content:  Thought content normal          Judgment: Judgment normal           Carly Bowser, [address] [place] [address]

## 2021-10-27 ENCOUNTER — TELEPHONE (OUTPATIENT)
Dept: FAMILY MEDICINE CLINIC | Facility: CLINIC | Age: 56
End: 2021-10-27

## 2021-11-01 ENCOUNTER — OFFICE VISIT (OUTPATIENT)
Dept: GASTROENTEROLOGY | Facility: CLINIC | Age: 56
End: 2021-11-01
Payer: COMMERCIAL

## 2021-11-01 VITALS
WEIGHT: 126.2 LBS | SYSTOLIC BLOOD PRESSURE: 108 MMHG | BODY MASS INDEX: 24.77 KG/M2 | HEIGHT: 60 IN | DIASTOLIC BLOOD PRESSURE: 66 MMHG

## 2021-11-01 DIAGNOSIS — R14.0 BLOATING: ICD-10-CM

## 2021-11-01 DIAGNOSIS — K21.9 GASTROESOPHAGEAL REFLUX DISEASE WITHOUT ESOPHAGITIS: Primary | ICD-10-CM

## 2021-11-01 DIAGNOSIS — K22.70 BARRETT'S ESOPHAGUS WITHOUT DYSPLASIA: ICD-10-CM

## 2021-11-01 DIAGNOSIS — Z12.11 SCREENING FOR COLON CANCER: ICD-10-CM

## 2021-11-01 PROCEDURE — 3008F BODY MASS INDEX DOCD: CPT | Performed by: INTERNAL MEDICINE

## 2021-11-01 PROCEDURE — 99244 OFF/OP CNSLTJ NEW/EST MOD 40: CPT | Performed by: INTERNAL MEDICINE

## 2021-11-01 RX ORDER — FAMOTIDINE 40 MG/1
40 TABLET, FILM COATED ORAL
Qty: 30 TABLET | Refills: 2 | Status: SHIPPED | OUTPATIENT
Start: 2021-11-01

## 2021-11-10 ENCOUNTER — TELEPHONE (OUTPATIENT)
Dept: GASTROENTEROLOGY | Facility: CLINIC | Age: 56
End: 2021-11-10

## 2021-12-14 ENCOUNTER — ANESTHESIA (OUTPATIENT)
Dept: GASTROENTEROLOGY | Facility: AMBULATORY SURGERY CENTER | Age: 56
End: 2021-12-14

## 2021-12-14 ENCOUNTER — HOSPITAL ENCOUNTER (OUTPATIENT)
Dept: GASTROENTEROLOGY | Facility: AMBULATORY SURGERY CENTER | Age: 56
Discharge: HOME | End: 2021-12-14
Payer: COMMERCIAL

## 2021-12-14 ENCOUNTER — ANESTHESIA EVENT (OUTPATIENT)
Dept: GASTROENTEROLOGY | Facility: AMBULATORY SURGERY CENTER | Age: 56
End: 2021-12-14

## 2021-12-14 VITALS
HEART RATE: 80 BPM | SYSTOLIC BLOOD PRESSURE: 113 MMHG | OXYGEN SATURATION: 99 % | RESPIRATION RATE: 15 BRPM | TEMPERATURE: 99 F | DIASTOLIC BLOOD PRESSURE: 86 MMHG

## 2021-12-14 DIAGNOSIS — K21.9 GASTROESOPHAGEAL REFLUX DISEASE WITHOUT ESOPHAGITIS: ICD-10-CM

## 2021-12-14 DIAGNOSIS — K22.70 BARRETT'S ESOPHAGUS WITHOUT DYSPLASIA: ICD-10-CM

## 2021-12-14 PROCEDURE — 88305 TISSUE EXAM BY PATHOLOGIST: CPT | Performed by: PATHOLOGY

## 2021-12-14 PROCEDURE — 88342 IMHCHEM/IMCYTCHM 1ST ANTB: CPT | Performed by: PATHOLOGY

## 2021-12-14 PROCEDURE — 43239 EGD BIOPSY SINGLE/MULTIPLE: CPT | Performed by: INTERNAL MEDICINE

## 2021-12-14 RX ORDER — PROPOFOL 10 MG/ML
INJECTION, EMULSION INTRAVENOUS AS NEEDED
Status: DISCONTINUED | OUTPATIENT
Start: 2021-12-14 | End: 2021-12-14

## 2021-12-14 RX ORDER — GLYCOPYRROLATE 0.2 MG/ML
INJECTION INTRAMUSCULAR; INTRAVENOUS AS NEEDED
Status: DISCONTINUED | OUTPATIENT
Start: 2021-12-14 | End: 2021-12-14

## 2021-12-14 RX ORDER — LIDOCAINE HYDROCHLORIDE 10 MG/ML
INJECTION, SOLUTION EPIDURAL; INFILTRATION; INTRACAUDAL; PERINEURAL AS NEEDED
Status: DISCONTINUED | OUTPATIENT
Start: 2021-12-14 | End: 2021-12-14

## 2021-12-14 RX ORDER — SODIUM CHLORIDE, SODIUM LACTATE, POTASSIUM CHLORIDE, CALCIUM CHLORIDE 600; 310; 30; 20 MG/100ML; MG/100ML; MG/100ML; MG/100ML
50 INJECTION, SOLUTION INTRAVENOUS CONTINUOUS
Status: DISCONTINUED | OUTPATIENT
Start: 2021-12-14 | End: 2021-12-14

## 2021-12-14 RX ADMIN — SODIUM CHLORIDE, SODIUM LACTATE, POTASSIUM CHLORIDE, CALCIUM CHLORIDE 50 ML/HR: 600; 310; 30; 20 INJECTION, SOLUTION INTRAVENOUS at 12:53

## 2021-12-14 RX ADMIN — LIDOCAINE HYDROCHLORIDE 100 MG: 10 INJECTION, SOLUTION EPIDURAL; INFILTRATION; INTRACAUDAL; PERINEURAL at 13:09

## 2021-12-14 RX ADMIN — PROPOFOL 30 MG: 10 INJECTION, EMULSION INTRAVENOUS at 13:15

## 2021-12-14 RX ADMIN — PROPOFOL 100 MG: 10 INJECTION, EMULSION INTRAVENOUS at 13:09

## 2021-12-14 RX ADMIN — GLYCOPYRROLATE 0.1 MG: 0.2 INJECTION INTRAMUSCULAR; INTRAVENOUS at 13:03

## 2021-12-14 RX ADMIN — PROPOFOL 40 MG: 10 INJECTION, EMULSION INTRAVENOUS at 13:12

## 2022-01-02 ENCOUNTER — HOSPITAL ENCOUNTER (EMERGENCY)
Facility: HOSPITAL | Age: 57
Discharge: HOME | End: 2022-01-02
Attending: EMERGENCY MEDICINE | Admitting: EMERGENCY MEDICINE
Payer: COMMERCIAL

## 2022-01-02 VITALS
BODY MASS INDEX: 24.63 KG/M2 | TEMPERATURE: 98.2 F | HEART RATE: 70 BPM | SYSTOLIC BLOOD PRESSURE: 129 MMHG | RESPIRATION RATE: 18 BRPM | DIASTOLIC BLOOD PRESSURE: 57 MMHG | OXYGEN SATURATION: 98 % | WEIGHT: 126.1 LBS

## 2022-01-02 DIAGNOSIS — J06.9 URI (UPPER RESPIRATORY INFECTION): Primary | ICD-10-CM

## 2022-01-02 PROCEDURE — 87636 SARSCOV2 & INF A&B AMP PRB: CPT | Performed by: PHYSICIAN ASSISTANT

## 2022-01-02 PROCEDURE — 99284 EMERGENCY DEPT VISIT MOD MDM: CPT | Performed by: PHYSICIAN ASSISTANT

## 2022-01-02 PROCEDURE — 99283 EMERGENCY DEPT VISIT LOW MDM: CPT

## 2022-01-02 NOTE — Clinical Note
Daniel Rodrigez was seen and treated in our emergency department on 1/2/2022  Diagnosis:     Myrna Gonzalez  may return to work on return date  She may return on this date: 01/04/2022    COVID test pending, if positive may return on 1/4 provided improving symptoms and no fever x 24 hours     If you have any questions or concerns, please don't hesitate to call        Luciana Amos PA-C    ______________________________           _______________          _______________  Hospital Representative                              Date                                Time

## 2022-01-02 NOTE — ED PROVIDER NOTES
History  Chief Complaint   Patient presents with    Sore Throat     To ED with sore throat, runny nose, congestion for several days  25-year-old female presents emergency department for evaluation of sore throat, rhinorrhea, postnasal drip, in general malaise over the past week  Denies associated fevers or chills  Was vaccinated against COVID however did not receive a booster dose  Denies any nausea or vomiting  Prior to Admission Medications   Prescriptions Last Dose Informant Patient Reported? Taking? Multiple Vitamin tablet  Self Yes No   Sig: Take by mouth   famotidine (PEPCID) 40 MG tablet   No No   Sig: Take 1 tablet (40 mg total) by mouth daily at bedtime   omeprazole (PriLOSEC) 40 MG capsule  Self No No   Sig: Take 1 capsule (40 mg total) by mouth daily      Facility-Administered Medications: None       Past Medical History:   Diagnosis Date    Roque's esophagus        Past Surgical History:   Procedure Laterality Date    CHOLECYSTECTOMY      COLON SURGERY      COLONOSCOPY      GASTRIC BYPASS      GASTROSTOMY      HERNIA REPAIR      TUBAL LIGATION         Family History   Problem Relation Age of Onset    Heart disease Mother     Diabetes Sister     Thyroid cancer Son     Heart disease Maternal Grandmother     Diabetes Maternal Grandmother     Stroke Maternal Grandmother     ALS Maternal Grandfather     ALS Maternal [de-identified]     ALS Cousin     Thyroid disease Sister     Colon cancer Neg Hx     Colon polyps Neg Hx      I have reviewed and agree with the history as documented  E-Cigarette/Vaping    E-Cigarette Use Never User      E-Cigarette/Vaping Substances     Social History     Tobacco Use    Smoking status: Never Smoker    Smokeless tobacco: Never Used   Vaping Use    Vaping Use: Never used   Substance Use Topics    Alcohol use: Not Currently    Drug use: Not Currently       Review of Systems   Constitutional: Negative for chills, diaphoresis and fever  HENT: Positive for congestion, postnasal drip, rhinorrhea and sore throat  Eyes: Negative for visual disturbance  Respiratory: Negative for cough and shortness of breath  Cardiovascular: Negative for chest pain and palpitations  Gastrointestinal: Negative for abdominal pain, diarrhea, nausea and vomiting  Genitourinary: Negative for dysuria, flank pain and frequency  Musculoskeletal: Negative for arthralgias and myalgias  Skin: Negative for color change, rash and wound  Allergic/Immunologic: Negative for immunocompromised state  Neurological: Negative for dizziness and light-headedness  Hematological: Does not bruise/bleed easily  Psychiatric/Behavioral: Negative for confusion  The patient is not nervous/anxious  Physical Exam  Physical Exam  Vitals reviewed  Constitutional:       General: She is not in acute distress  Appearance: She is well-developed  She is not diaphoretic  HENT:      Head: Normocephalic and atraumatic  Right Ear: Tympanic membrane normal       Left Ear: Tympanic membrane normal       Mouth/Throat:      Mouth: Mucous membranes are moist       Pharynx: Posterior oropharyngeal erythema present  No pharyngeal swelling or oropharyngeal exudate  Eyes:      General: No scleral icterus  Pupils: Pupils are equal, round, and reactive to light  Neck:      Vascular: No JVD  Cardiovascular:      Rate and Rhythm: Normal rate and regular rhythm  Heart sounds: No murmur heard  No friction rub  No gallop  Pulmonary:      Effort: No respiratory distress  Breath sounds: No wheezing or rales  Abdominal:      General: Bowel sounds are normal  There is no distension  Palpations: Abdomen is soft  There is no mass  Tenderness: There is no abdominal tenderness  There is no guarding or rebound  Skin:     General: Skin is warm and dry  Capillary Refill: Capillary refill takes less than 2 seconds  Coloration: Skin is not pale  Neurological:      Mental Status: She is alert and oriented to person, place, and time  Psychiatric:         Behavior: Behavior normal          Vital Signs  ED Triage Vitals [01/02/22 1034]   Temperature Pulse Respirations Blood Pressure SpO2   98 2 °F (36 8 °C) 70 18 129/57 98 %      Temp Source Heart Rate Source Patient Position - Orthostatic VS BP Location FiO2 (%)   Temporal Monitor Sitting Left arm --      Pain Score       8           Vitals:    01/02/22 1034   BP: 129/57   Pulse: 70   Patient Position - Orthostatic VS: Sitting         Visual Acuity      ED Medications  Medications - No data to display    Diagnostic Studies  Results Reviewed     Procedure Component Value Units Date/Time    COVID/FLU [[id number]] Collected: 01/02/22 1039    Lab Status: In process Specimen: Nares from Nasopharyngeal Swab Updated: 01/02/22 1043                 No orders to display              Procedures  Procedures         ED Course                                             MDM  Number of Diagnoses or Management Options  URI (upper respiratory infection): new and requires workup  Diagnosis management comments: Viral etiology suspected, covid r/o sent       Amount and/or Complexity of Data Reviewed  Tests in the medicine section of CPT®: ordered  Review and summarize past medical records: yes        Disposition  Final diagnoses:   URI (upper respiratory infection)     Time reflects when diagnosis was documented in both MDM as applicable and the Disposition within this note     Time User Action Codes Description Comment    1/2/2022 10:39 AM Lashay Renteria Add [J06 9] URI (upper respiratory infection)       ED Disposition     ED Disposition Condition Date/Time Comment    Discharge Stable Sun Jan 2, 2022 10:39 AM Obed Lawson discharge to home/self care              Follow-up Information     Follow up With Specialties Details Why Contact Info [address] [facility] [address] Emergency Medicine  If symptoms worsen [address] [place],[address]  [phone] [address] Emergency Department, [address] [place], [facility]          Discharge Medication List as of 1/2/2022 10:40 AM      CONTINUE these medications which have NOT CHANGED    Details   famotidine (PEPCID) 40 MG tablet Take 1 tablet (40 mg total) by mouth daily at bedtime, Starting Mon 11/1/2021, Normal      Multiple Vitamin tablet Take by mouth, Historical Med      omeprazole (PriLOSEC) 40 MG capsule Take 1 capsule (40 mg total) by mouth daily, Starting Wed 6/23/2021, Normal             No discharge procedures on file      PDMP Review     None          ED Provider  Electronically Signed by           True Stout PA-C  01/02/22 3889

## 2022-01-02 NOTE — Clinical Note
Brittanie Laguerre was seen and treated in our emergency department on 1/2/2022  Diagnosis:     Parker Coppola  may return to work on return date  She may return on this date: 01/12/2022         If you have any questions or concerns, please don't hesitate to call        Alberto Aamya PA-C    ______________________________           _______________          _______________  Hospital Representative                              Date                                Time

## 2022-01-06 LAB
FLUAV RNA RESP QL NAA+PROBE: NEGATIVE
FLUBV RNA RESP QL NAA+PROBE: NEGATIVE
SARS-COV-2 RNA RESP QL NAA+PROBE: POSITIVE

## 2022-01-28 ENCOUNTER — OFFICE VISIT (OUTPATIENT)
Dept: FAMILY MEDICINE CLINIC | Facility: CLINIC | Age: 57
End: 2022-01-28
Payer: COMMERCIAL

## 2022-01-28 VITALS
HEART RATE: 68 BPM | RESPIRATION RATE: 14 BRPM | DIASTOLIC BLOOD PRESSURE: 70 MMHG | WEIGHT: 127 LBS | SYSTOLIC BLOOD PRESSURE: 126 MMHG | HEIGHT: 60 IN | BODY MASS INDEX: 24.94 KG/M2

## 2022-01-28 DIAGNOSIS — Z87.19 HISTORY OF BARRETT'S ESOPHAGUS: ICD-10-CM

## 2022-01-28 DIAGNOSIS — K64.4 EXTERNAL HEMORRHOID: Primary | ICD-10-CM

## 2022-01-28 PROCEDURE — 99213 OFFICE O/P EST LOW 20 MIN: CPT | Performed by: NURSE PRACTITIONER

## 2022-01-28 PROCEDURE — 1036F TOBACCO NON-USER: CPT | Performed by: NURSE PRACTITIONER

## 2022-01-28 RX ORDER — HYDROCORTISONE 25 MG/G
CREAM TOPICAL 2 TIMES DAILY
Qty: 28 G | Refills: 0 | Status: SHIPPED | OUTPATIENT
Start: 2022-01-28

## 2022-01-28 RX ORDER — OMEPRAZOLE 40 MG/1
CAPSULE, DELAYED RELEASE ORAL
Qty: 90 CAPSULE | Refills: 1 | Status: SHIPPED | OUTPATIENT
Start: 2022-01-28

## 2022-01-28 NOTE — PROGRESS NOTES
[facility]        NAME: Carmen Diaz is a 60 y o  female  : 1965    MRN: [id number]  DATE: 2022  TIME: 9:24 AM    Assessment and Plan   External hemorrhoid [K64 4]  1  External hemorrhoid  hydrocortisone (ANUSOL-HC) 2 5 % rectal cream    Ambulatory Referral to Colorectal Surgery         Patient Instructions     Patient Instructions     Apply Anusol cream as directed  F/up with colo/rectal surgery for possible hemorrhoidectomy  Call with problems/concerns  Hemorrhoids   AMBULATORY CARE:   Hemorrhoids  are swollen blood vessels inside your rectum (internal hemorrhoids) or on your anus (external hemorrhoids)  Sometimes a hemorrhoid may prolapse  This means it extends out of your anus  Common symptoms include the following:   · Pain or itching around your anus or inside your rectum    · Swelling or bumps around your anus    · Bright red blood in your bowel movement, on the toilet paper, or in the toilet bowl    · Tissue bulging out of your anus (prolapsed hemorrhoids)    · Incontinence (poor control over urine or bowel movements)    Seek care immediately if:   · You have severe pain in your rectum or around your anus  · You have severe pain in your abdomen and you are vomiting  · You have bleeding from your anus that soaks through your underwear  Contact your healthcare provider if:   · You have frequent and painful bowel movements  · Your hemorrhoid looks or feels more swollen than usual      · You do not have a bowel movement for 2 days or more  · You see or feel tissue coming through your anus  · You have questions or concerns about your condition or care  Treatment for hemorrhoids  may include medicines to decrease pain, swelling, and itching  The medicine may be a pill, pad, cream, or ointment  Medicine may also be given to soften your bowel movement  Surgery and other procedures may be needed to shrink or remove your hemorrhoids  Manage your symptoms:   · Apply ice on your anus for 15 to 20 minutes every hour or as directed  Use an ice pack, or put crushed ice in a plastic bag  Cover it with a towel before you apply it to your anus  Ice helps prevent tissue damage and decreases swelling and pain  · Take a sitz bath  Fill a bathtub with 4 to 6 inches of warm water  You may also use a sitz bath pan that fits inside a toilet bowl  Sit in the sitz bath for 15 minutes  Do this 3 times a day, and after each bowel movement  The warm water can help decrease pain and swelling  · Keep your anal area clean  Gently wash the area with warm water daily  Soap may irritate the area  After a bowel movement, wipe with moist towelettes or wet toilet paper  Dry toilet paper can irritate the area  Prevent hemorrhoids:   · Do not strain to have a bowel movement  Do not sit on the toilet too long  These actions can increase pressure on the tissues in your rectum and anus  · Drink plenty of liquids  Liquids can help prevent constipation  Ask how much liquid to drink each day and which liquids are best for you  · Eat a variety of high-fiber foods  Examples include fruits, vegetables, and whole grains  Ask your healthcare provider how much fiber you need each day  You may need to take a fiber supplement  · Exercise as directed  Exercise, such as walking, may make it easier to have a bowel movement  Ask your healthcare provider to help you create an exercise plan  · Do not have anal sex  Anal sex can weaken the skin around your rectum and anus  · Avoid heavy lifting  This can cause straining and increase your risk for another hemorrhoid  Follow up with your doctor as directed:  Write down your questions so you remember to ask them during your visits  © [employer] 2021 Information is for End User's use only and may not be sold, redistributed or otherwise used for commercial purposes   All illustrations and images included in CareNotes® are the copyrighted property of [employer]  or [facility] Rocael Jackie   The above information is an  only  It is not intended as medical advice for individual conditions or treatments  Talk to your doctor, nurse or pharmacist before following any medical regimen to see if it is safe and effective for you  Chief Complaint     Chief Complaint   Patient presents with    Follow-up     hemmoroids         History of Present Illness       C/o hemorrhoid on and off x 6 years  Has an external hemorrhoid, very painful, some bleeding over the past 4 days  Painful to sit and with bending down  Using OTC hemorrhoid cream/spray with little relief  Review of Systems   Review of Systems   Constitutional: Negative for activity change and fever  Respiratory: Negative for chest tightness and shortness of breath  Cardiovascular: Negative for chest pain  Gastrointestinal: Positive for anal bleeding and rectal pain  Negative for abdominal distention, abdominal pain, constipation, diarrhea, nausea and vomiting  External hemorrhoid   Neurological: Negative for dizziness and weakness  Psychiatric/Behavioral: Negative for dysphoric mood  All other systems reviewed and are negative          Current Medications       Current Outpatient Medications:     famotidine (PEPCID) 40 MG tablet, Take 1 tablet (40 mg total) by mouth daily at bedtime, Disp: 30 tablet, Rfl: 2    hydrocortisone (ANUSOL-HC) 2 5 % rectal cream, Apply topically 2 (two) times a day, Disp: 28 g, Rfl: 0    Multiple Vitamin tablet, Take by mouth, Disp: , Rfl:     omeprazole (PriLOSEC) 40 MG capsule, Take 1 capsule (40 mg total) by mouth daily, Disp: 90 capsule, Rfl: 1    Current Allergies     Allergies as of 01/28/2022    (No Known Allergies)            The following portions of the patient's history were reviewed and updated as appropriate: allergies, current medications, past family history, past medical history, past social history, past surgical history and problem list      Past Medical History:   Diagnosis Date    Roque's esophagus        Past Surgical History:   Procedure Laterality Date    CHOLECYSTECTOMY      COLON SURGERY      COLONOSCOPY      GASTRIC BYPASS      GASTROSTOMY      HERNIA REPAIR      TUBAL LIGATION         Family History   Problem Relation Age of Onset    Heart disease Mother     Diabetes Sister     Thyroid cancer Son     Heart disease Maternal Grandmother     Diabetes Maternal Grandmother     Stroke Maternal Grandmother     ALS Maternal Grandfather     ALS Maternal Aunt     ALS Cousin     Thyroid disease Sister     Colon cancer Neg Hx     Colon polyps Neg Hx          Medications have been verified  Objective   /70   Pulse 68   Resp 14   Ht 5' (1 524 m)   Wt 57 6 kg (127 lb)   BMI 24 80 kg/m²        Physical Exam     Physical Exam  Vitals and nursing note reviewed  Constitutional:       General: She is not in acute distress  Appearance: Normal appearance  She is normal weight  She is not ill-appearing  HENT:      Head: Normocephalic  Cardiovascular:      Rate and Rhythm: Normal rate and regular rhythm  Heart sounds: Normal heart sounds  No murmur heard  No gallop  Pulmonary:      Effort: Pulmonary effort is normal  No respiratory distress  Breath sounds: Normal breath sounds  Genitourinary:     Rectum: External hemorrhoid present  Skin:     General: Skin is warm and dry  Coloration: Skin is not pale  Findings: No erythema  Neurological:      Mental Status: She is alert and oriented to person, place, and time     Psychiatric:         Mood and Affect: Mood normal          Behavior: Behavior normal

## 2022-01-28 NOTE — PATIENT INSTRUCTIONS
Apply Anusol cream as directed  F/up with colo/rectal surgery for possible hemorrhoidectomy  Call with problems/concerns  Hemorrhoids   AMBULATORY CARE:   Hemorrhoids  are swollen blood vessels inside your rectum (internal hemorrhoids) or on your anus (external hemorrhoids)  Sometimes a hemorrhoid may prolapse  This means it extends out of your anus  Common symptoms include the following:   · Pain or itching around your anus or inside your rectum    · Swelling or bumps around your anus    · Bright red blood in your bowel movement, on the toilet paper, or in the toilet bowl    · Tissue bulging out of your anus (prolapsed hemorrhoids)    · Incontinence (poor control over urine or bowel movements)    Seek care immediately if:   · You have severe pain in your rectum or around your anus  · You have severe pain in your abdomen and you are vomiting  · You have bleeding from your anus that soaks through your underwear  Contact your healthcare provider if:   · You have frequent and painful bowel movements  · Your hemorrhoid looks or feels more swollen than usual      · You do not have a bowel movement for 2 days or more  · You see or feel tissue coming through your anus  · You have questions or concerns about your condition or care  Treatment for hemorrhoids  may include medicines to decrease pain, swelling, and itching  The medicine may be a pill, pad, cream, or ointment  Medicine may also be given to soften your bowel movement  Surgery and other procedures may be needed to shrink or remove your hemorrhoids  Manage your symptoms:   · Apply ice on your anus for 15 to 20 minutes every hour or as directed  Use an ice pack, or put crushed ice in a plastic bag  Cover it with a towel before you apply it to your anus  Ice helps prevent tissue damage and decreases swelling and pain  · Take a sitz bath  Fill a bathtub with 4 to 6 inches of warm water   You may also use a sitz bath pan that fits inside a toilet bowl  Sit in the sitz bath for 15 minutes  Do this 3 times a day, and after each bowel movement  The warm water can help decrease pain and swelling  · Keep your anal area clean  Gently wash the area with warm water daily  Soap may irritate the area  After a bowel movement, wipe with moist towelettes or wet toilet paper  Dry toilet paper can irritate the area  Prevent hemorrhoids:   · Do not strain to have a bowel movement  Do not sit on the toilet too long  These actions can increase pressure on the tissues in your rectum and anus  · Drink plenty of liquids  Liquids can help prevent constipation  Ask how much liquid to drink each day and which liquids are best for you  · Eat a variety of high-fiber foods  Examples include fruits, vegetables, and whole grains  Ask your healthcare provider how much fiber you need each day  You may need to take a fiber supplement  · Exercise as directed  Exercise, such as walking, may make it easier to have a bowel movement  Ask your healthcare provider to help you create an exercise plan  · Do not have anal sex  Anal sex can weaken the skin around your rectum and anus  · Avoid heavy lifting  This can cause straining and increase your risk for another hemorrhoid  Follow up with your doctor as directed:  Write down your questions so you remember to ask them during your visits  © Copyright [employer] 2021 Information is for End User's use only and may not be sold, redistributed or otherwise used for commercial purposes  All illustrations and images included in CareNotes® are the copyrighted property of [employer]  or [facility] Nathaniel Mejia   The above information is an  only  It is not intended as medical advice for individual conditions or treatments  Talk to your doctor, nurse or pharmacist before following any medical regimen to see if it is safe and effective for you

## 2022-07-09 ENCOUNTER — TELEPHONE ENCOUNTER (OUTPATIENT)
Dept: URBAN - METROPOLITAN AREA CLINIC 121 | Facility: CLINIC | Age: 57
End: 2022-07-09

## 2022-07-09 RX ORDER — MELOXICAM 7.5 MG/1
TABLET ORAL TAKE AS DIRECTED
Refills: 0 | OUTPATIENT
Start: 2018-03-29 | End: 2018-04-05

## 2022-07-09 RX ORDER — DIAPER,BRIEF,INFANT-TODD,DISP
EACH MISCELLANEOUS ONCE A DAY
Refills: 0 | OUTPATIENT
Start: 2020-03-11 | End: 2020-03-11

## 2022-07-09 RX ORDER — SERTRALINE 25 MG/1
TABLET, FILM COATED ORAL TAKE AS DIRECTED
Refills: 0 | OUTPATIENT
Start: 2019-04-11 | End: 2019-05-14

## 2022-07-09 RX ORDER — CHOLECALCIFEROL (VITAMIN D3) 50 MCG
TABLET ORAL ONCE A DAY
Refills: 0 | OUTPATIENT
Start: 2020-03-11 | End: 2020-10-01

## 2022-07-09 RX ORDER — FAMOTIDINE 20 MG/1
TABLET ORAL TWICE A DAY
Refills: 0 | OUTPATIENT
Start: 2020-01-30 | End: 2020-03-11

## 2022-07-09 RX ORDER — CHOLECALCIFEROL (VITAMIN D3) 50 MCG
TABLET ORAL ONCE A DAY
Refills: 0 | OUTPATIENT
Start: 2019-05-14 | End: 2020-03-11

## 2022-07-09 RX ORDER — ZINC OXIDE 13 %
CREAM (GRAM) TOPICAL ONCE A DAY
Refills: 0 | OUTPATIENT
Start: 2020-03-11 | End: 2020-10-01

## 2022-07-09 RX ORDER — DIAPER,BRIEF,INFANT-TODD,DISP
EACH MISCELLANEOUS ONCE A DAY
Refills: 0 | OUTPATIENT
Start: 2019-05-14 | End: 2020-03-11

## 2022-07-09 RX ORDER — PANTOPRAZOLE SODIUM 40 MG/1
ONCE A DAY TABLET, DELAYED RELEASE ORAL ONCE A DAY
Refills: 3 | OUTPATIENT
Start: 2020-10-01 | End: 2020-10-01

## 2022-07-10 ENCOUNTER — TELEPHONE ENCOUNTER (OUTPATIENT)
Dept: URBAN - METROPOLITAN AREA CLINIC 121 | Facility: CLINIC | Age: 57
End: 2022-07-10

## 2022-07-10 RX ORDER — ESOMEPRAZOLE MAGNESIUM 40 MG
ONCE A DAY CAPSULE,DELAYED RELEASE (ENTERIC COATED) ORAL ONCE A DAY
Refills: 6 | Status: ACTIVE | COMMUNITY
Start: 2020-10-05

## 2022-07-10 RX ORDER — FAMOTIDINE 20 MG/1
TABLET ORAL TWICE A DAY
Refills: 0 | Status: ACTIVE | COMMUNITY
Start: 2020-03-11

## 2022-07-10 RX ORDER — MULTIVITAMIN/IRON/FOLIC ACID 18MG-0.4MG
TABLET ORAL ONCE A DAY
Refills: 0 | Status: ACTIVE | COMMUNITY
Start: 2018-04-05

## 2022-07-10 RX ORDER — PANTOPRAZOLE SODIUM 40 MG/1
ONCE A DAY TABLET, DELAYED RELEASE ORAL ONCE A DAY
Refills: 3 | Status: ACTIVE | COMMUNITY
Start: 2020-10-01

## 2022-07-10 RX ORDER — PANTOPRAZOLE SODIUM 40 MG/1
ONCE A DAY TABLET, DELAYED RELEASE ORAL ONCE A DAY
Refills: 3 | Status: ACTIVE | COMMUNITY
Start: 2020-03-11

## 2022-07-30 ENCOUNTER — TELEPHONE ENCOUNTER (OUTPATIENT)
Age: 57
End: 2022-07-30

## 2022-07-31 ENCOUNTER — TELEPHONE ENCOUNTER (OUTPATIENT)
Age: 57
End: 2022-07-31

## 2022-10-10 ENCOUNTER — OFFICE VISIT (OUTPATIENT)
Dept: FAMILY MEDICINE CLINIC | Facility: CLINIC | Age: 57
End: 2022-10-10
Payer: COMMERCIAL

## 2022-10-10 VITALS
HEART RATE: 91 BPM | OXYGEN SATURATION: 97 % | HEIGHT: 60 IN | DIASTOLIC BLOOD PRESSURE: 76 MMHG | WEIGHT: 126.8 LBS | BODY MASS INDEX: 24.9 KG/M2 | RESPIRATION RATE: 16 BRPM | SYSTOLIC BLOOD PRESSURE: 118 MMHG

## 2022-10-10 DIAGNOSIS — R53.83 FATIGUE, UNSPECIFIED TYPE: ICD-10-CM

## 2022-10-10 DIAGNOSIS — Z13.0 SCREENING FOR DEFICIENCY ANEMIA: ICD-10-CM

## 2022-10-10 DIAGNOSIS — R63.1 INCREASED THIRST: ICD-10-CM

## 2022-10-10 DIAGNOSIS — Z00.00 ANNUAL PHYSICAL EXAM: Primary | ICD-10-CM

## 2022-10-10 DIAGNOSIS — J01.00 ACUTE NON-RECURRENT MAXILLARY SINUSITIS: ICD-10-CM

## 2022-10-10 DIAGNOSIS — Z13.29 THYROID DISORDER SCREEN: ICD-10-CM

## 2022-10-10 DIAGNOSIS — Z12.31 ENCOUNTER FOR SCREENING MAMMOGRAM FOR BREAST CANCER: ICD-10-CM

## 2022-10-10 PROCEDURE — 99396 PREV VISIT EST AGE 40-64: CPT | Performed by: NURSE PRACTITIONER

## 2022-10-10 PROCEDURE — 99214 OFFICE O/P EST MOD 30 MIN: CPT | Performed by: NURSE PRACTITIONER

## 2022-10-10 RX ORDER — AZITHROMYCIN 250 MG/1
TABLET, FILM COATED ORAL
Qty: 6 TABLET | Refills: 0 | Status: SHIPPED | OUTPATIENT
Start: 2022-10-10 | End: 2022-10-10

## 2022-10-10 RX ORDER — AZITHROMYCIN 250 MG/1
TABLET, FILM COATED ORAL
Qty: 6 TABLET | Refills: 0 | Status: SHIPPED | OUTPATIENT
Start: 2022-10-10 | End: 2022-10-15

## 2022-10-10 NOTE — PROGRESS NOTES
[address]    NAME: Edward Zaldivar  AGE: 59 y o  SEX: female  : 1965     DATE: 10/10/2022     Assessment and Plan:     Problem List Items Addressed This Visit    None     Visit Diagnoses     Encounter for screening mammogram for breast cancer    -  Primary    Relevant Orders    Mammo screening bilateral w 3d & cad    Annual physical exam        Relevant Orders    Lipid panel    Comprehensive metabolic panel    Screening for deficiency anemia        Relevant Orders    CBC and differential    Iron    Thyroid disorder screen        Relevant Orders    TSH, 3rd generation with Free T4 reflex    Acute non-recurrent maxillary sinusitis        Relevant Medications    azithromycin (Zithromax) 250 mg tablet          Immunizations and preventive care screenings were discussed with patient today  Appropriate education was printed on patient's after visit summary  Counseling:  Alcohol/drug use: discussed moderation in alcohol intake, the recommendations for healthy alcohol use, and avoidance of illicit drug use  Dental Health: discussed importance of regular tooth brushing, flossing, and dental visits  Injury prevention: discussed safety/seat belts, safety helmets, smoke detectors, carbon dioxide detectors, and smoking near bedding or upholstery  Sexual health: discussed sexually transmitted diseases, partner selection, use of condoms, avoidance of unintended pregnancy, and contraceptive alternatives  · Exercise: the importance of regular exercise/physical activity was discussed  Recommend exercise 3-5 times per week for at least 30 minutes  Depression Screening and Follow-up Plan: Patient was screened for depression during today's encounter  They screened negative with a PHQ-2 score of 0  No follow-ups on file       Chief Complaint:     Chief Complaint   Patient presents with   • Physical Exam      History of Present Illness:     Adult Annual Physical   Patient here for a comprehensive physical exam  The patient reports problems - fatigue, sinus pain  Diet and Physical Activity  · Diet/Nutrition: well balanced diet  · Exercise: no formal exercise  Depression Screening  PHQ-2/9 Depression Screening    Little interest or pleasure in doing things: 0 - not at all  Feeling down, depressed, or hopeless: 0 - not at all  PHQ-2 Score: 0  PHQ-2 Interpretation: Negative depression screen       General Health  · Sleep: sleeps well  · Hearing: normal - bilateral   · Vision: goes for regular eye exams and wears glasses  · Dental: regular dental visits  /GYN Health  · Patient is: postmenopausal     Review of Systems:     Review of Systems   Constitutional: Positive for fatigue  Negative for activity change, appetite change, chills, diaphoresis and fever  HENT: Positive for postnasal drip, sinus pressure and sinus pain  Negative for congestion, dental problem, drooling, ear discharge, ear pain, facial swelling, hearing loss, mouth sores, nosebleeds, rhinorrhea, sneezing, sore throat, tinnitus, trouble swallowing and voice change  Eyes: Negative for photophobia, pain, discharge, itching and visual disturbance  Respiratory: Negative for apnea, cough, chest tightness, shortness of breath and wheezing  Cardiovascular: Negative for chest pain, palpitations and leg swelling  Gastrointestinal: Negative for abdominal distention, abdominal pain, anal bleeding, blood in stool, constipation, diarrhea, nausea, rectal pain and vomiting  Endocrine: Positive for polydipsia  Negative for cold intolerance, heat intolerance, polyphagia and polyuria  Genitourinary: Negative for decreased urine volume, difficulty urinating, dysuria, flank pain, frequency, hematuria, pelvic pain, urgency, vaginal bleeding, vaginal discharge and vaginal pain     Musculoskeletal: Negative for arthralgias, back pain, gait problem, joint swelling, myalgias, neck pain and neck stiffness  Skin: Negative for color change, pallor, rash and wound  Neurological: Positive for headaches  Negative for dizziness, tremors, seizures, syncope, facial asymmetry, speech difficulty, weakness, light-headedness and numbness  Hematological: Negative for adenopathy  Does not bruise/bleed easily  Psychiatric/Behavioral: Negative for agitation, behavioral problems, confusion, decreased concentration, dysphoric mood, hallucinations, self-injury, sleep disturbance and suicidal ideas  The patient is not nervous/anxious and is not hyperactive         Past Medical History:     Past Medical History:   Diagnosis Date   • Roque's esophagus       Past Surgical History:     Past Surgical History:   Procedure Laterality Date   • CHOLECYSTECTOMY     • COLON SURGERY     • COLONOSCOPY     • GASTRIC BYPASS     • GASTROSTOMY     • HERNIA REPAIR     • TUBAL LIGATION        Social History:     Social History     Socioeconomic History   • Marital status: /Civil Union     Spouse name: Martine Dalton   • Number of children: 3   • Years of education: None   • Highest education level: None   Occupational History   • Occupation:    Tobacco Use   • Smoking status: Never Smoker   • Smokeless tobacco: Never Used   Vaping Use   • Vaping Use: Never used   Substance and Sexual Activity   • Alcohol use: Not Currently   • Drug use: Not Currently   • Sexual activity: None   Other Topics Concern   • None   Social History Narrative   • None     Social Determinants of Health     Financial Resource Strain: Not on file   Food Insecurity: Not on file   Transportation Needs: Not on file   Physical Activity: Not on file   Stress: Not on file   Social Connections: Not on file   Intimate Partner Violence: Not on file   Housing Stability: Not on file      Family History:     Family History   Problem Relation Age of Onset   • Heart disease Mother    • Diabetes Sister    • Thyroid cancer Son    • Heart disease Maternal Grandmother    • Diabetes Maternal Grandmother    • Stroke Maternal Grandmother    • ALS Maternal Grandfather    • ALS Maternal Aunt    • ALS Cousin    • Thyroid disease Sister    • Colon cancer Neg Hx    • Colon polyps Neg Hx       Current Medications:     Current Outpatient Medications   Medication Sig Dispense Refill   • azithromycin (Zithromax) 250 mg tablet Take 2 tablets (500 mg total) by mouth daily for 1 day, THEN 1 tablet (250 mg total) daily for 4 days  6 tablet 0   • Multiple Vitamin tablet Take by mouth     • NIFEdipine 0 3%-lidocaine 5% rectal ointment Apply 1 application topically every 4 (four) hours as needed for discomfort or pain Apply a small amount to anal fissure 30 g 2     No current facility-administered medications for this visit  Allergies:     No Known Allergies   Physical Exam:     /76   Pulse 91   Resp 16   Ht 5' (1 524 m)   Wt 57 5 kg (126 lb 12 8 oz)   SpO2 97%   BMI 24 76 kg/m²     Physical Exam  Vitals and nursing note reviewed  Constitutional:       General: She is not in acute distress  Appearance: Normal appearance  She is normal weight  She is not ill-appearing, toxic-appearing or diaphoretic  HENT:      Head: Normocephalic  Right Ear: Tympanic membrane, ear canal and external ear normal  There is no impacted cerumen  Left Ear: Tympanic membrane, ear canal and external ear normal  There is no impacted cerumen  Nose: No congestion or rhinorrhea  Right Sinus: Maxillary sinus tenderness present  Left Sinus: Maxillary sinus tenderness present  Mouth/Throat:      Mouth: Mucous membranes are moist       Pharynx: Oropharynx is clear  Posterior oropharyngeal erythema present  No oropharyngeal exudate  Eyes:      General: No scleral icterus  Right eye: No discharge  Left eye: No discharge  Extraocular Movements: Extraocular movements intact        Conjunctiva/sclera: Conjunctivae normal  Pupils: Pupils are equal, round, and reactive to light  Neck:      Vascular: No carotid bruit  Cardiovascular:      Rate and Rhythm: Normal rate and regular rhythm  Pulses: Normal pulses  Heart sounds: Normal heart sounds  No murmur heard  No friction rub  No gallop  Pulmonary:      Effort: Pulmonary effort is normal  No respiratory distress  Breath sounds: Normal breath sounds  No stridor  No wheezing, rhonchi or rales  Chest:      Chest wall: No tenderness  Abdominal:      General: Abdomen is flat  Bowel sounds are normal  There is no distension  Palpations: Abdomen is soft  There is no mass  Tenderness: There is no abdominal tenderness  There is no right CVA tenderness, left CVA tenderness, guarding or rebound  Hernia: No hernia is present  Musculoskeletal:         General: No swelling, tenderness, deformity or signs of injury  Normal range of motion  Cervical back: Normal range of motion and neck supple  No rigidity or tenderness  No muscular tenderness  Right lower leg: No edema  Left lower leg: No edema  Lymphadenopathy:      Cervical: No cervical adenopathy  Skin:     General: Skin is warm  Coloration: Skin is not jaundiced or pale  Findings: No bruising, erythema, lesion or rash  Neurological:      General: No focal deficit present  Mental Status: She is alert and oriented to person, place, and time  Cranial Nerves: No cranial nerve deficit  Sensory: No sensory deficit  Motor: No weakness  Coordination: Coordination normal       Gait: Gait normal       Deep Tendon Reflexes: Reflexes normal    Psychiatric:         Mood and Affect: Mood normal          Behavior: Behavior normal          Thought Content:  Thought content normal          Judgment: Judgment normal         PHQ-2/9 Depression Screening    Little interest or pleasure in doing things: 0 - not at all  Feeling down, depressed, or hopeless: 0 - not at all  PHQ-2 Score: 0  PHQ-2 Interpretation: Negative depression screen           Carly Neville Nip, [address] [facility]

## 2022-10-10 NOTE — PATIENT INSTRUCTIONS
Fasting labs and mammogram as ordered  Discussed viral vs bacterial infection  RX as ordered  Continue OTC cough/cold medications as directed  Call or return for problems/concerns        Wellness Visit for Adults   AMBULATORY CARE:   A wellness visit  is when you see your healthcare provider to get screened for health problems  Your healthcare provider will also give you advice on how to stay healthy  Write down your questions so you remember to ask them  Ask your healthcare provider how often you should have a wellness visit  What happens at a wellness visit:  Your healthcare provider will ask about your health, and your family history of health problems  This includes high blood pressure, heart disease, and cancer  He or she will ask if you have symptoms that concern you, if you smoke, and about your mood  You may also be asked about your intake of medicines, supplements, food, and alcohol  Any of the following may be done: Your weight  will be checked  Your height may also be checked so your body mass index (BMI) can be calculated  Your BMI shows if you are at a healthy weight  Your blood pressure  and heart rate will be checked  Your temperature may also be checked  Blood and urine tests  may be done  Blood tests may be done to check your cholesterol levels  Abnormal cholesterol levels increase your risk for heart disease and stroke  You may also need a blood or urine test to check for diabetes if you are at increased risk  Urine tests may be done to look for signs of an infection or kidney disease  A physical exam  includes checking your heartbeat and lungs with a stethoscope  Your healthcare provider may also check your skin to look for sun damage  Screening tests  may be recommended  A screening test is done to check for diseases that may not cause symptoms  The screening tests you may need depend on your age, gender, family history, and lifestyle habits   For example, colorectal screening may be recommended if you are 48years old or older  Screening tests you need if you are a woman:   A Pap smear  is used to screen for cervical cancer  Pap smears are usually done every 3 to 5 years depending on your age  You may need them more often if you have had abnormal Pap smear test results in the past  Ask your healthcare provider how often you should have a Pap smear  A mammogram  is an x-ray of your breasts to screen for breast cancer  Experts recommend mammograms every 2 years starting at age 48 years  You may need a mammogram at age 52 years or younger if you have an increased risk for breast cancer  Talk to your healthcare provider about when you should start having mammograms and how often you need them  Vaccines you may need:   Get an influenza vaccine  every year  The influenza vaccine protects you from the flu  Several types of viruses cause the flu  The viruses change over time, so new vaccines are made each year  Get a tetanus-diphtheria (Td) booster vaccine  every 10 years  This vaccine protects you against tetanus and diphtheria  Tetanus is a severe infection that may cause painful muscle spasms and lockjaw  Diphtheria is a severe bacterial infection that causes a thick covering in the back of your mouth and throat  Get a human papillomavirus (HPV) vaccine  if you are female and aged 23 to 32 or male 23 to 24 and never received it  This vaccine protects you from HPV infection  HPV is the most common infection spread by sexual contact  HPV may also cause vaginal, penile, and anal cancers  Get a pneumococcal vaccine  if you are aged 72 years or older  The pneumococcal vaccine is an injection given to protect you from pneumococcal disease  Pneumococcal disease is an infection caused by pneumococcal bacteria  The infection may cause pneumonia, meningitis, or an ear infection  Get a shingles vaccine  if you are 60 or older, even if you have had shingles before   The shingles vaccine is an injection to protect you from the varicella-zoster virus  This is the same virus that causes chickenpox  Shingles is a painful rash that develops in people who had chickenpox or have been exposed to the virus  How to eat healthy:  My Plate is a model for planning healthy meals  It shows the types and amounts of foods that should go on your plate  Fruits and vegetables make up about half of your plate, and grains and protein make up the other half  A serving of dairy is included on the side of your plate  The amount of calories and serving sizes you need depends on your age, gender, weight, and height  Examples of healthy foods are listed below:  Eat a variety of vegetables  such as dark green, red, and orange vegetables  You can also include canned vegetables low in sodium (salt) and frozen vegetables without added butter or sauces  Eat a variety of fresh fruits , canned fruit in 100% juice, frozen fruit, and dried fruit  Include whole grains  At least half of the grains you eat should be whole grains  Examples include whole-wheat bread, wheat pasta, brown rice, and whole-grain cereals such as oatmeal     Eat a variety of protein foods such as seafood (fish and shellfish), lean meat, and poultry without skin (turkey and chicken)  Examples of lean meats include pork leg, shoulder, or tenderloin, and beef round, sirloin, tenderloin, and extra lean ground beef  Other protein foods include eggs and egg substitutes, beans, peas, soy products, nuts, and seeds  Choose low-fat dairy products such as skim or 1% milk or low-fat yogurt, cheese, and cottage cheese  Limit unhealthy fats  such as butter, hard margarine, and shortening  Exercise:  Exercise at least 30 minutes per day on most days of the week  Some examples of exercise include walking, biking, dancing, and swimming   You can also fit in more physical activity by taking the stairs instead of the elevator or parking farther away from stores  Include muscle strengthening activities 2 days each week  Regular exercise provides many health benefits  It helps you manage your weight, and decreases your risk for type 2 diabetes, heart disease, stroke, and high blood pressure  Exercise can also help improve your mood  Ask your healthcare provider about the best exercise plan for you  General health and safety guidelines:   Do not smoke  Nicotine and other chemicals in cigarettes and cigars can cause lung damage  Ask your healthcare provider for information if you currently smoke and need help to quit  E-cigarettes or smokeless tobacco still contain nicotine  Talk to your healthcare provider before you use these products  Limit alcohol  A drink of alcohol is 12 ounces of beer, 5 ounces of wine, or 1½ ounces of liquor  Lose weight, if needed  Being overweight increases your risk of certain health conditions  These include heart disease, high blood pressure, type 2 diabetes, and certain types of cancer  Protect your skin  Do not sunbathe or use tanning beds  Use sunscreen with a SPF 15 or higher  Apply sunscreen at least 15 minutes before you go outside  Reapply sunscreen every 2 hours  Wear protective clothing, hats, and sunglasses when you are outside  Drive safely  Always wear your seatbelt  Make sure everyone in your car wears a seatbelt  A seatbelt can save your life if you are in an accident  Do not use your cell phone when you are driving  This could distract you and cause an accident  Pull over if you need to make a call or send a text message  Practice safe sex  Use latex condoms if are sexually active and have more than one partner  Your healthcare provider may recommend screening tests for sexually transmitted infections (STIs)  Wear helmets, lifejackets, and protective gear  Always wear a helmet when you ride a bike or motorcycle, go skiing, or play sports that could cause a head injury   Wear protective equipment when you play sports  Wear a lifejacket when you are on a boat or doing water sports  © Copyright [employer] 2022 Information is for End User's use only and may not be sold, redistributed or otherwise used for commercial purposes  All illustrations and images included in CareNotes® are the copyrighted property of [employer]  or Johann Mejia   The above information is an  only  It is not intended as medical advice for individual conditions or treatments  Talk to your doctor, nurse or pharmacist before following any medical regimen to see if it is safe and effective for you

## 2022-10-10 NOTE — PROGRESS NOTES
[facility]        NAME: Kwadwo Estrella is a 59 y o  female  : 1965    MRN: [id number]  DATE: October 10, 2022  TIME: 3:10 PM    Assessment and Plan   Annual physical exam [X84 56]  7  Annual physical exam  Lipid panel    Comprehensive metabolic panel    Lipid panel    Comprehensive metabolic panel   2  Encounter for screening mammogram for breast cancer  Mammo screening bilateral w 3d & cad   3  Screening for deficiency anemia  CBC and differential    Iron    CBC and differential    Iron   4  Thyroid disorder screen  TSH, 3rd generation with Free T4 reflex    TSH, 3rd generation with Free T4 reflex   5  Acute non-recurrent maxillary sinusitis  azithromycin (Zithromax) 250 mg tablet    DISCONTINUED: azithromycin (Zithromax) 250 mg tablet   6  Fatigue, unspecified type  Iron   7  Increased thirst  Comprehensive metabolic panel    HEMOGLOBIN A1C W/ EAG ESTIMATION    HEMOGLOBIN A1C W/ EAG ESTIMATION         Patient Instructions     Patient Instructions     Fasting labs and mammogram as ordered  Discussed viral vs bacterial infection  RX as ordered  Continue OTC cough/cold medications as directed  Call or return for problems/concerns        Wellness Visit for Adults   AMBULATORY CARE:   A wellness visit  is when you see your healthcare provider to get screened for health problems  Your healthcare provider will also give you advice on how to stay healthy  Write down your questions so you remember to ask them  Ask your healthcare provider how often you should have a wellness visit  What happens at a wellness visit:  Your healthcare provider will ask about your health, and your family history of health problems  This includes high blood pressure, heart disease, and cancer  He or she will ask if you have symptoms that concern you, if you smoke, and about your mood  You may also be asked about your intake of medicines, supplements, food, and alcohol   Any of the following may be done:  · Your weight  will be checked  Your height may also be checked so your body mass index (BMI) can be calculated  Your BMI shows if you are at a healthy weight  · Your blood pressure  and heart rate will be checked  Your temperature may also be checked  · Blood and urine tests  may be done  Blood tests may be done to check your cholesterol levels  Abnormal cholesterol levels increase your risk for heart disease and stroke  You may also need a blood or urine test to check for diabetes if you are at increased risk  Urine tests may be done to look for signs of an infection or kidney disease  · A physical exam  includes checking your heartbeat and lungs with a stethoscope  Your healthcare provider may also check your skin to look for sun damage  · Screening tests  may be recommended  A screening test is done to check for diseases that may not cause symptoms  The screening tests you may need depend on your age, gender, family history, and lifestyle habits  For example, colorectal screening may be recommended if you are 48years old or older  Screening tests you need if you are a woman:   · A Pap smear  is used to screen for cervical cancer  Pap smears are usually done every 3 to 5 years depending on your age  You may need them more often if you have had abnormal Pap smear test results in the past  Ask your healthcare provider how often you should have a Pap smear  · A mammogram  is an x-ray of your breasts to screen for breast cancer  Experts recommend mammograms every 2 years starting at age 48 years  You may need a mammogram at age 52 years or younger if you have an increased risk for breast cancer  Talk to your healthcare provider about when you should start having mammograms and how often you need them  Vaccines you may need:   · Get an influenza vaccine  every year  The influenza vaccine protects you from the flu  Several types of viruses cause the flu   The viruses change over time, so new vaccines are made each year  · Get a tetanus-diphtheria (Td) booster vaccine  every 10 years  This vaccine protects you against tetanus and diphtheria  Tetanus is a severe infection that may cause painful muscle spasms and lockjaw  Diphtheria is a severe bacterial infection that causes a thick covering in the back of your mouth and throat  · Get a human papillomavirus (HPV) vaccine  if you are female and aged 23 to 32 or male 23 to 24 and never received it  This vaccine protects you from HPV infection  HPV is the most common infection spread by sexual contact  HPV may also cause vaginal, penile, and anal cancers  · Get a pneumococcal vaccine  if you are aged 72 years or older  The pneumococcal vaccine is an injection given to protect you from pneumococcal disease  Pneumococcal disease is an infection caused by pneumococcal bacteria  The infection may cause pneumonia, meningitis, or an ear infection  · Get a shingles vaccine  if you are 60 or older, even if you have had shingles before  The shingles vaccine is an injection to protect you from the varicella-zoster virus  This is the same virus that causes chickenpox  Shingles is a painful rash that develops in people who had chickenpox or have been exposed to the virus  How to eat healthy:  My Plate is a model for planning healthy meals  It shows the types and amounts of foods that should go on your plate  Fruits and vegetables make up about half of your plate, and grains and protein make up the other half  A serving of dairy is included on the side of your plate  The amount of calories and serving sizes you need depends on your age, gender, weight, and height  Examples of healthy foods are listed below:  · Eat a variety of vegetables  such as dark green, red, and orange vegetables  You can also include canned vegetables low in sodium (salt) and frozen vegetables without added butter or sauces      · Eat a variety of fresh fruits , canned fruit in 100% juice, frozen fruit, and dried fruit  · Include whole grains  At least half of the grains you eat should be whole grains  Examples include whole-wheat bread, wheat pasta, brown rice, and whole-grain cereals such as oatmeal     · Eat a variety of protein foods such as seafood (fish and shellfish), lean meat, and poultry without skin (turkey and chicken)  Examples of lean meats include pork leg, shoulder, or tenderloin, and beef round, sirloin, tenderloin, and extra lean ground beef  Other protein foods include eggs and egg substitutes, beans, peas, soy products, nuts, and seeds  · Choose low-fat dairy products such as skim or 1% milk or low-fat yogurt, cheese, and cottage cheese  · Limit unhealthy fats  such as butter, hard margarine, and shortening  Exercise:  Exercise at least 30 minutes per day on most days of the week  Some examples of exercise include walking, biking, dancing, and swimming  You can also fit in more physical activity by taking the stairs instead of the elevator or parking farther away from stores  Include muscle strengthening activities 2 days each week  Regular exercise provides many health benefits  It helps you manage your weight, and decreases your risk for type 2 diabetes, heart disease, stroke, and high blood pressure  Exercise can also help improve your mood  Ask your healthcare provider about the best exercise plan for you  General health and safety guidelines:   · Do not smoke  Nicotine and other chemicals in cigarettes and cigars can cause lung damage  Ask your healthcare provider for information if you currently smoke and need help to quit  E-cigarettes or smokeless tobacco still contain nicotine  Talk to your healthcare provider before you use these products  · Limit alcohol  A drink of alcohol is 12 ounces of beer, 5 ounces of wine, or 1½ ounces of liquor  · Lose weight, if needed  Being overweight increases your risk of certain health conditions   These include heart disease, high blood pressure, type 2 diabetes, and certain types of cancer  · Protect your skin  Do not sunbathe or use tanning beds  Use sunscreen with a SPF 15 or higher  Apply sunscreen at least 15 minutes before you go outside  Reapply sunscreen every 2 hours  Wear protective clothing, hats, and sunglasses when you are outside  · Drive safely  Always wear your seatbelt  Make sure everyone in your car wears a seatbelt  A seatbelt can save your life if you are in an accident  Do not use your cell phone when you are driving  This could distract you and cause an accident  Pull over if you need to make a call or send a text message  · Practice safe sex  Use latex condoms if are sexually active and have more than one partner  Your healthcare provider may recommend screening tests for sexually transmitted infections (STIs)  · Wear helmets, lifejackets, and protective gear  Always wear a helmet when you ride a bike or motorcycle, go skiing, or play sports that could cause a head injury  Wear protective equipment when you play sports  Wear a lifejacket when you are on a boat or doing water sports  © Copyright [employer] 2022 Information is for End User's use only and may not be sold, redistributed or otherwise used for commercial purposes  All illustrations and images included in CareNotes® are the copyrighted property of [employer]  or [facility] Nathaniel Mejia   The above information is an  only  It is not intended as medical advice for individual conditions or treatments  Talk to your doctor, nurse or pharmacist before following any medical regimen to see if it is safe and effective for you  Chief Complaint     Chief Complaint   Patient presents with   • Physical Exam         History of Present Illness       C/o sinus pain/pressure, headache x 2 weeks  Taking OTC medications with little relief  No fever  Also c/o fatigue, very thirsty, constantly chewing ice  Due for labs  Patient had colonoscopy 2 years ago when she lived in [place] have records sent  Review of Systems   Review of Systems   Constitutional: Positive for fatigue  Negative for activity change, chills, diaphoresis and fever  HENT: Positive for postnasal drip, sinus pressure and sinus pain  Negative for congestion, ear pain and sore throat  Eyes: Negative for pain, discharge and redness  Respiratory: Negative for cough, shortness of breath and wheezing  Cardiovascular: Negative for chest pain  Gastrointestinal: Negative for blood in stool, constipation, diarrhea, nausea and vomiting  Endocrine: Positive for polydipsia  Genitourinary: Negative for difficulty urinating  Musculoskeletal: Negative for myalgias  Skin: Negative for rash  Neurological: Positive for headaches  Negative for dizziness  Psychiatric/Behavioral: Negative for dysphoric mood  Current Medications       Current Outpatient Medications:   •  azithromycin (Zithromax) 250 mg tablet, Take 2 tablets (500 mg total) by mouth daily for 1 day, THEN 1 tablet (250 mg total) daily for 4 days  , Disp: 6 tablet, Rfl: 0  •  Multiple Vitamin tablet, Take by mouth, Disp: , Rfl:   •  NIFEdipine 0 3%-lidocaine 5% rectal ointment, Apply 1 application topically every 4 (four) hours as needed for discomfort or pain Apply a small amount to anal fissure, Disp: 30 g, Rfl: 2    Current Allergies     Allergies as of 10/10/2022   • (No Known Allergies)            The following portions of the patient's history were reviewed and updated as appropriate: allergies, current medications, past family history, past medical history, past social history, past surgical history and problem list      Past Medical History:   Diagnosis Date   • Roque's esophagus        Past Surgical History:   Procedure Laterality Date   • CHOLECYSTECTOMY     • COLON SURGERY     • COLONOSCOPY     • GASTRIC BYPASS     • GASTROSTOMY     • HERNIA REPAIR     • TUBAL LIGATION         Family History   Problem Relation Age of Onset   • Heart disease Mother    • Diabetes Sister    • Thyroid cancer Son    • Heart disease Maternal Grandmother    • Diabetes Maternal Grandmother    • Stroke Maternal Grandmother    • ALS Maternal Grandfather    • ALS Maternal Aunt    • ALS Cousin    • Thyroid disease Sister    • Colon cancer Neg Hx    • Colon polyps Neg Hx          Medications have been verified  Objective   /76   Pulse 91   Resp 16   Ht 5' (1 524 m)   Wt 57 5 kg (126 lb 12 8 oz)   SpO2 97%   BMI 24 76 kg/m²        Physical Exam     Physical Exam  Vitals and nursing note reviewed  Constitutional:       General: She is not in acute distress  Appearance: Normal appearance  She is normal weight  She is not ill-appearing or diaphoretic  HENT:      Head: Normocephalic  Right Ear: Tympanic membrane, ear canal and external ear normal  There is no impacted cerumen  Left Ear: Tympanic membrane, ear canal and external ear normal  There is no impacted cerumen  Nose: No congestion  Right Sinus: Maxillary sinus tenderness present  Left Sinus: Maxillary sinus tenderness present  Mouth/Throat:      Mouth: Mucous membranes are dry  Pharynx: Posterior oropharyngeal erythema present  No oropharyngeal exudate  Eyes:      General:         Right eye: No discharge  Left eye: No discharge  Extraocular Movements: Extraocular movements intact  Conjunctiva/sclera: Conjunctivae normal    Neck:      Vascular: No carotid bruit  Cardiovascular:      Rate and Rhythm: Normal rate and regular rhythm  Heart sounds: Normal heart sounds  No murmur heard  No friction rub  No gallop  Pulmonary:      Effort: Pulmonary effort is normal  No respiratory distress  Breath sounds: Normal breath sounds  No wheezing or rhonchi  Chest:      Chest wall: No tenderness     Musculoskeletal:      Cervical back: Normal range of motion and neck supple  No rigidity or tenderness  Lymphadenopathy:      Cervical: No cervical adenopathy  Neurological:      Mental Status: She is alert

## 2022-10-20 LAB
ALBUMIN SERPL-MCNC: 4 G/DL (ref 3.6–5.1)
ALBUMIN/GLOB SERPL: 1.7 (CALC) (ref 1–2.5)
ALP SERPL-CCNC: 108 U/L (ref 37–153)
ALT SERPL-CCNC: 21 U/L (ref 6–29)
AST SERPL-CCNC: 29 U/L (ref 10–35)
BASOPHILS # BLD AUTO: 69 CELLS/UL (ref 0–200)
BASOPHILS NFR BLD AUTO: 1.3 %
BILIRUB SERPL-MCNC: 0.4 MG/DL (ref 0.2–1.2)
BUN SERPL-MCNC: 19 MG/DL (ref 7–25)
BUN/CREAT SERPL: NORMAL (CALC) (ref 6–22)
CALCIUM SERPL-MCNC: 9.2 MG/DL (ref 8.6–10.4)
CHLORIDE SERPL-SCNC: 104 MMOL/L (ref 98–110)
CHOLEST SERPL-MCNC: 153 MG/DL
CHOLEST/HDLC SERPL: 1.8 (CALC)
CO2 SERPL-SCNC: 28 MMOL/L (ref 20–32)
CREAT SERPL-MCNC: 0.7 MG/DL (ref 0.5–1.03)
EOSINOPHIL # BLD AUTO: 260 CELLS/UL (ref 15–500)
EOSINOPHIL NFR BLD AUTO: 4.9 %
ERYTHROCYTE [DISTWIDTH] IN BLOOD BY AUTOMATED COUNT: 17.9 % (ref 11–15)
GFR/BSA.PRED SERPLBLD CYS-BASED-ARV: 100 ML/MIN/1.73M2
GLOBULIN SER CALC-MCNC: 2.4 G/DL (CALC) (ref 1.9–3.7)
GLUCOSE SERPL-MCNC: 88 MG/DL (ref 65–99)
HCT VFR BLD AUTO: 28.4 % (ref 35–45)
HDLC SERPL-MCNC: 87 MG/DL
HGB BLD-MCNC: 8 G/DL (ref 11.7–15.5)
IRON SERPL-MCNC: 17 MCG/DL (ref 45–160)
LDLC SERPL CALC-MCNC: 54 MG/DL (CALC)
LYMPHOCYTES # BLD AUTO: 1643 CELLS/UL (ref 850–3900)
LYMPHOCYTES NFR BLD AUTO: 31 %
MCH RBC QN AUTO: 17.8 PG (ref 27–33)
MCHC RBC AUTO-ENTMCNC: 28.2 G/DL (ref 32–36)
MCV RBC AUTO: 63.3 FL (ref 80–100)
MONOCYTES # BLD AUTO: 530 CELLS/UL (ref 200–950)
MONOCYTES NFR BLD AUTO: 10 %
NEUTROPHILS # BLD AUTO: 2798 CELLS/UL (ref 1500–7800)
NEUTROPHILS NFR BLD AUTO: 52.8 %
NONHDLC SERPL-MCNC: 66 MG/DL (CALC)
PLATELET # BLD AUTO: 396 THOUSAND/UL (ref 140–400)
PMV BLD REES-ECKER: 10.1 FL (ref 7.5–12.5)
POTASSIUM SERPL-SCNC: 4.4 MMOL/L (ref 3.5–5.3)
PROT SERPL-MCNC: 6.4 G/DL (ref 6.1–8.1)
RBC # BLD AUTO: 4.49 MILLION/UL (ref 3.8–5.1)
SODIUM SERPL-SCNC: 139 MMOL/L (ref 135–146)
TRIGL SERPL-MCNC: 50 MG/DL
TSH SERPL-ACNC: 1.26 MIU/L (ref 0.4–4.5)
WBC # BLD AUTO: 5.3 THOUSAND/UL (ref 3.8–10.8)

## 2022-10-21 ENCOUNTER — TELEPHONE (OUTPATIENT)
Dept: FAMILY MEDICINE CLINIC | Facility: CLINIC | Age: 57
End: 2022-10-21

## 2022-10-21 DIAGNOSIS — D64.9 LOW HEMOGLOBIN: ICD-10-CM

## 2022-10-21 DIAGNOSIS — E61.1 LOW IRON: Primary | ICD-10-CM

## 2022-11-03 ENCOUNTER — TELEPHONE (OUTPATIENT)
Dept: HEMATOLOGY ONCOLOGY | Facility: CLINIC | Age: 57
End: 2022-11-03

## 2022-11-03 NOTE — TELEPHONE ENCOUNTER
Appointment Cancellation Or Reschedule     Person calling in Patient    If other than patient calling, are they listed on the communication consent form? Provider Dr Kb Aviles   Office Visit Date and Time  11/4/22   Office Visit Location [facility]   Did patient want to reschedule their office appointment? If so, when was it scheduled to? 11/28/22   Did you have STAR scheduled for this appointment? no   Do you need STAR set up for your new appointment? If yes, please send to "PATIENT RIDESHARE" pool for STAR rescheduling no   If you are cancelling appointment, can we notify STAR to cancel ride? If yes, please send to "PATIENT RIDESHARE" pool for STAR to cancel service no   Is this patient calling to reschedule an infusion appointment? no   When is their next infusion appointment? no   Is this patient a Chemo patient? no   Reason for Cancellation or Reschedule Patient is sick     If the patient is a treatment patient, please route this to the office nurse  If the patient is not on treatment, please route to the office MA  If the patient is a surgical oncology patient, please route to surg/onc clinical pool

## 2022-11-04 ENCOUNTER — APPOINTMENT (EMERGENCY)
Dept: RADIOLOGY | Facility: HOSPITAL | Age: 57
End: 2022-11-04

## 2022-11-04 ENCOUNTER — TELEPHONE (OUTPATIENT)
Dept: HEMATOLOGY ONCOLOGY | Facility: CLINIC | Age: 57
End: 2022-11-04

## 2022-11-04 ENCOUNTER — HOSPITAL ENCOUNTER (EMERGENCY)
Facility: HOSPITAL | Age: 57
Discharge: HOME | End: 2022-11-04
Attending: EMERGENCY MEDICINE

## 2022-11-04 VITALS
BODY MASS INDEX: 25.52 KG/M2 | SYSTOLIC BLOOD PRESSURE: 122 MMHG | DIASTOLIC BLOOD PRESSURE: 82 MMHG | TEMPERATURE: 98.2 F | HEIGHT: 60 IN | WEIGHT: 130 LBS | OXYGEN SATURATION: 100 % | RESPIRATION RATE: 18 BRPM | HEART RATE: 72 BPM

## 2022-11-04 DIAGNOSIS — R53.83 FATIGUE: ICD-10-CM

## 2022-11-04 DIAGNOSIS — D50.9 IRON DEFICIENCY ANEMIA, UNSPECIFIED IRON DEFICIENCY ANEMIA TYPE: Primary | ICD-10-CM

## 2022-11-04 DIAGNOSIS — B33.8 RSV INFECTION: ICD-10-CM

## 2022-11-04 LAB
ANION GAP SERPL CALCULATED.3IONS-SCNC: 7 MMOL/L (ref 4–13)
ATRIAL RATE: 67 BPM
BASOPHILS # BLD AUTO: 0.09 THOUSANDS/ÂΜL (ref 0–0.1)
BASOPHILS NFR BLD AUTO: 1 % (ref 0–1)
BUN SERPL-MCNC: 24 MG/DL (ref 5–25)
CALCIUM SERPL-MCNC: 8.8 MG/DL (ref 8.3–10.1)
CHLORIDE SERPL-SCNC: 103 MMOL/L (ref 96–108)
CO2 SERPL-SCNC: 29 MMOL/L (ref 21–32)
CREAT SERPL-MCNC: 0.79 MG/DL (ref 0.6–1.3)
EOSINOPHIL # BLD AUTO: 0.36 THOUSAND/ÂΜL (ref 0–0.61)
EOSINOPHIL NFR BLD AUTO: 4 % (ref 0–6)
ERYTHROCYTE [DISTWIDTH] IN BLOOD BY AUTOMATED COUNT: 20.3 % (ref 11.6–15.1)
FERRITIN SERPL-MCNC: 3 NG/ML (ref 8–388)
FLUAV RNA RESP QL NAA+PROBE: NEGATIVE
FLUBV RNA RESP QL NAA+PROBE: NEGATIVE
GFR SERPL CREATININE-BSD FRML MDRD: 82 ML/MIN/1.73SQ M
GLUCOSE SERPL-MCNC: 86 MG/DL (ref 65–140)
HCT VFR BLD AUTO: 33.6 % (ref 34.8–46.1)
HGB BLD-MCNC: 9.3 G/DL (ref 11.5–15.4)
IMM GRANULOCYTES # BLD AUTO: 0.02 THOUSAND/UL (ref 0–0.2)
IMM GRANULOCYTES NFR BLD AUTO: 0 % (ref 0–2)
IRON SATN MFR SERPL: 3 % (ref 15–50)
IRON SERPL-MCNC: 17 UG/DL (ref 50–170)
LYMPHOCYTES # BLD AUTO: 2.07 THOUSANDS/ÂΜL (ref 0.6–4.47)
LYMPHOCYTES NFR BLD AUTO: 23 % (ref 14–44)
MCH RBC QN AUTO: 17.6 PG (ref 26.8–34.3)
MCHC RBC AUTO-ENTMCNC: 27.7 G/DL (ref 31.4–37.4)
MCV RBC AUTO: 64 FL (ref 82–98)
MONOCYTES # BLD AUTO: 0.9 THOUSAND/ÂΜL (ref 0.17–1.22)
MONOCYTES NFR BLD AUTO: 10 % (ref 4–12)
NEUTROPHILS # BLD AUTO: 5.41 THOUSANDS/ÂΜL (ref 1.85–7.62)
NEUTS SEG NFR BLD AUTO: 62 % (ref 43–75)
NRBC BLD AUTO-RTO: 0 /100 WBCS
P AXIS: 20 DEGREES
PLATELET # BLD AUTO: 417 THOUSANDS/UL (ref 149–390)
PMV BLD AUTO: 9.8 FL (ref 8.9–12.7)
POTASSIUM SERPL-SCNC: 4 MMOL/L (ref 3.5–5.3)
PR INTERVAL: 152 MS
QRS AXIS: 13 DEGREES
QRSD INTERVAL: 80 MS
QT INTERVAL: 388 MS
QTC INTERVAL: 409 MS
RBC # BLD AUTO: 5.27 MILLION/UL (ref 3.81–5.12)
RSV RNA RESP QL NAA+PROBE: POSITIVE
SARS-COV-2 RNA RESP QL NAA+PROBE: NEGATIVE
SODIUM SERPL-SCNC: 139 MMOL/L (ref 135–147)
T WAVE AXIS: 32 DEGREES
TIBC SERPL-MCNC: 609 UG/DL (ref 250–450)
VENTRICULAR RATE: 67 BPM
WBC # BLD AUTO: 8.85 THOUSAND/UL (ref 4.31–10.16)

## 2022-11-04 RX ORDER — IRON POLYSACCHARIDE COMPLEX 150 MG
150 CAPSULE ORAL 2 TIMES DAILY
Qty: 30 CAPSULE | Refills: 0 | Status: SHIPPED | OUTPATIENT
Start: 2022-11-04 | End: 2022-11-19

## 2022-11-04 RX ORDER — IRON POLYSACCHARIDE COMPLEX 150 MG
150 CAPSULE ORAL DAILY
Status: DISCONTINUED | OUTPATIENT
Start: 2022-11-04 | End: 2022-11-04 | Stop reason: HOSPADM

## 2022-11-04 RX ADMIN — Medication 150 MG: at 13:17

## 2022-11-04 NOTE — ED PROVIDER NOTES
History  Chief Complaint   Patient presents with   • Abnormal Lab     Pt to er with complaints of thinking that she has a viral infection, has not been feeling well  Was supposed to get an iron infusion yesterday but canceled due to thinking she was sick  She thinks that her symptoms are more related to her low iron and not just having a cold  26-year-old female with past medical history of iron deficiency anemia, recent hemoglobin level 8 as of 10/19  Patient did have scheduled appointment with Hematology scheduled for today  She has had ongoing symptoms headache, fatigue, mild shortness of breath while climbing stairs but no active chest pain  She has had no cough no fevers  No otherwise abdominal pain  She reports she did not good or appointment due to concern that maybe she had a “viral syndrome” as her  was recently diagnosed with one via virtual conference with PCP and was treated with a Zpack  She did contact her hematology office in the did reschedule her for 11/28  She did not know what else to do as far as feeling better and so she came to the emergency department in hopes for an iron infusion  Online discussions and epic were had with the hematologist office with concern that patient likely would not be able to obtain iron infusion emergency department  Here for evaluation  Prior to Admission Medications   Prescriptions Last Dose Informant Patient Reported? Taking?    Multiple Vitamin tablet  Self Yes No   Sig: Take by mouth   NIFEdipine 0 3%-lidocaine 5% rectal ointment   No No   Sig: Apply 1 application topically every 4 (four) hours as needed for discomfort or pain Apply a small amount to anal fissure      Facility-Administered Medications: None       Past Medical History:   Diagnosis Date   • Roque's esophagus    • Low iron        Past Surgical History:   Procedure Laterality Date   • CHOLECYSTECTOMY     • COLON SURGERY     • COLONOSCOPY     • GASTRIC BYPASS     • GASTROSTOMY     • HERNIA REPAIR     • TUBAL LIGATION         Family History   Problem Relation Age of Onset   • Heart disease Mother    • Diabetes Sister    • Thyroid cancer Son    • Heart disease Maternal Grandmother    • Diabetes Maternal Grandmother    • Stroke Maternal Grandmother    • ALS Maternal Grandfather    • ALS Maternal Aunt    • ALS Cousin    • Thyroid disease Sister    • Colon cancer Neg Hx    • Colon polyps Neg Hx      I have reviewed and agree with the history as documented  E-Cigarette/Vaping   • E-Cigarette Use Never User      E-Cigarette/Vaping Substances     Social History     Tobacco Use   • Smoking status: Never Smoker   • Smokeless tobacco: Never Used   Vaping Use   • Vaping Use: Never used   Substance Use Topics   • Alcohol use: Not Currently   • Drug use: Not Currently       Review of Systems   Constitutional: Positive for fatigue  Negative for chills and fever  HENT: Negative for ear pain and sore throat  Eyes: Negative for pain and visual disturbance  Respiratory: Positive for shortness of breath (With negotiation of stairs only)  Negative for cough  Cardiovascular: Negative for chest pain and palpitations  Gastrointestinal: Negative for abdominal pain and vomiting  Genitourinary: Negative for dysuria and hematuria  Musculoskeletal: Negative for arthralgias and back pain  Skin: Negative for color change and rash  Neurological: Positive for headaches  Negative for seizures and syncope  All other systems reviewed and are negative  Physical Exam  Physical Exam  Vitals and nursing note reviewed  Constitutional:       General: She is not in acute distress  Appearance: She is well-developed  HENT:      Head: Normocephalic and atraumatic  Eyes:      Conjunctiva/sclera: Conjunctivae normal    Cardiovascular:      Rate and Rhythm: Normal rate and regular rhythm  Heart sounds: No murmur heard    Pulmonary:      Effort: Pulmonary effort is normal  No respiratory distress  Breath sounds: Normal breath sounds  Abdominal:      Palpations: Abdomen is soft  Tenderness: There is no abdominal tenderness  Musculoskeletal:      Cervical back: Neck supple  Skin:     General: Skin is warm and dry  Neurological:      Mental Status: She is alert  Vital Signs  ED Triage Vitals [11/04/22 1117]   Temperature Pulse Respirations Blood Pressure SpO2   98 2 °F (36 8 °C) 72 18 122/82 100 %      Temp Source Heart Rate Source Patient Position - Orthostatic VS BP Location FiO2 (%)   Temporal Monitor Sitting Left arm --      Pain Score       --           Vitals:    11/04/22 1117   BP: 122/82   Pulse: 72   Patient Position - Orthostatic VS: Sitting         Visual Acuity      ED Medications  Medications   iron polysaccharides (FERREX) capsule 150 mg (150 mg Oral Given 11/4/22 1317)       Diagnostic Studies  Results Reviewed     Procedure Component Value Units Date/Time    FLU/RSV/COVID - if FLU/RSV clinically relevant [[id number]]  (Abnormal) Collected: 11/04/22 1210    Lab Status: Final result Specimen: Nares from Nose Updated: 11/04/22 1259     SARS-CoV-2 Negative     INFLUENZA A PCR Negative     INFLUENZA B PCR Negative     RSV PCR Positive    Narrative:      FOR PEDIATRIC PATIENTS - copy/paste COVID Guidelines URL to browser: [email or web address]    SARS-CoV-2 assay is a Nucleic Acid Amplification assay intended for the  qualitative detection of nucleic acid from SARS-CoV-2 in nasopharyngeal  swabs  Results are for the presumptive identification of SARS-CoV-2 RNA  Positive results are indicative of infection with SARS-CoV-2, the virus  causing COVID-19, but do not rule out bacterial infection or co-infection  with other viruses  Laboratories within the [place] and its  territories are required to report all positive results to the appropriate  public health authorities   Negative results do not preclude SARS-CoV-2  infection and should not be used as the sole basis for treatment or other  patient management decisions  Negative results must be combined with  clinical observations, patient history, and epidemiological information  This test has not been FDA cleared or approved  This test has been authorized by FDA under an Emergency Use Authorization  (EUA)  This test is only authorized for the duration of time the  declaration that circumstances exist justifying the authorization of the  emergency use of an in vitro diagnostic tests for detection of SARS-CoV-2  virus and/or diagnosis of COVID-19 infection under section 564(b)(1) of  the Act, 21 U  S C  208MQA-3(F)(4), unless the authorization is terminated  or revoked sooner  The test has been validated but independent review by FDA  and CLIA is pending  Test performed using [employer]: This RT-PCR assay targets N2,  a region unique to SARS-CoV-2  A conserved region in the E-gene was chosen  for pan-Sarbecovirus detection which includes SARS-CoV-2  According to CMS-2020-01-R, this platform meets the definition of high-throughput technology      Basic metabolic panel [[id number]] Collected: 11/04/22 1208    Lab Status: Final result Specimen: Blood from Arm, Left Updated: 11/04/22 1227     Sodium 139 mmol/L      Potassium 4 0 mmol/L      Chloride 103 mmol/L      CO2 29 mmol/L      ANION GAP 7 mmol/L      BUN 24 mg/dL      Creatinine 0 79 mg/dL      Glucose 86 mg/dL      Calcium 8 8 mg/dL      eGFR 82 ml/min/1 73sq m     Narrative:      Akanksha guidelines for Chronic Kidney Disease (CKD):   •  Stage 1 with normal or high GFR (GFR > 90 mL/min/1 73 square meters)  •  Stage 2 Mild CKD (GFR = 60-89 mL/min/1 73 square meters)  •  Stage 3A Moderate CKD (GFR = 45-59 mL/min/1 73 square meters)  •  Stage 3B Moderate CKD (GFR = 30-44 mL/min/1 73 square meters)  •  Stage 4 Severe CKD (GFR = 15-29 mL/min/1 73 square meters)  • Stage 5 End Stage CKD (GFR <15 mL/min/1 73 square meters)  Note: GFR calculation is accurate only with a steady state creatinine    CBC and differential [[id number]]  (Abnormal) Collected: 11/04/22 1208    Lab Status: Final result Specimen: Blood from Arm, Left Updated: 11/04/22 1218     WBC 8 85 Thousand/uL      RBC 5 27 Million/uL      Hemoglobin 9 3 g/dL      Hematocrit 33 6 %      MCV 64 fL      MCH 17 6 pg      MCHC 27 7 g/dL      RDW 20 3 %      MPV 9 8 fL      Platelets 318 Thousands/uL      nRBC 0 /100 WBCs      Neutrophils Relative 62 %      Immat GRANS % 0 %      Lymphocytes Relative 23 %      Monocytes Relative 10 %      Eosinophils Relative 4 %      Basophils Relative 1 %      Neutrophils Absolute 5 41 Thousands/µL      Immature Grans Absolute 0 02 Thousand/uL      Lymphocytes Absolute 2 07 Thousands/µL      Monocytes Absolute 0 90 Thousand/µL      Eosinophils Absolute 0 36 Thousand/µL      Basophils Absolute 0 09 Thousands/µL                  XR chest 1 view portable   ED Interpretation by Nathan Sesay PA-C (11/04 1216)   No acute disease  Final Result by Laura Maldonado MD (11/04 1258)      No acute cardiopulmonary disease        Findings are stable            Workstation performed: [id number]                    Procedures  ECG 12 Lead Documentation Only    Date/Time: 11/4/2022 11:35 AM  Performed by: Nathan Sesay PA-C  Authorized by: Nathan Sesay PA-C     ECG reviewed by me, the ED Provider: yes    Patient location:  ED  Previous ECG:     Comparison to cardiac monitor: Yes    Interpretation:     Interpretation: normal    Rate:     ECG rate:  67    ECG rate assessment: normal    Rhythm:     Rhythm: sinus rhythm    Ectopy:     Ectopy: none    QRS:     QRS axis:  Normal  Conduction:     Conduction: normal    ST segments:     ST segments:  Normal  T waves:     T waves: normal               ED Course  ED Course as of 11/04/22 1326   Fri Nov 04, 2022   1236 Hemoglobin(!): 9 3  Hemoglobin improved from prior 8 0   1322 Discussed results with positive RSV with patient  Discussed return emergency department for any newly developing or worsening signs or symptoms  Patient understood all instructions prior to discharge and plan agreed upon by patient and myself  1324 Discussed initiation of Niferex and continuation through PCP and still follow-up with hematology on 11/28                             SBIRT 22yo+    Flowsheet Row Most Recent Value   SBIRT (25 yo +)    In order to provide better care to our patients, we are screening all of our patients for alcohol and drug use  Would it be okay to ask you these screening questions? Yes Filed at: 11/04/2022 1242   Initial Alcohol Screen: US AUDIT-C     1  How often do you have a drink containing alcohol? 0 Filed at: 11/04/2022 1242   2  How many drinks containing alcohol do you have on a typical day you are drinking? 0 Filed at: 11/04/2022 1242   3a  Male UNDER 65: How often do you have five or more drinks on one occasion? 0 Filed at: 11/04/2022 1242   3b  FEMALE Any Age, or MALE 65+: How often do you have 4 or more drinks on one occassion? 0 Filed at: 11/04/2022 1242   Audit-C Score 0 Filed at: 11/04/2022 1242   BLAIR: How many times in the past year have you    Used an illegal drug or used a prescription medication for non-medical reasons?  Never Filed at: 11/04/2022 1242                    MDM  Number of Diagnoses or Management Options  Fatigue: new and requires workup  Iron deficiency anemia, unspecified iron deficiency anemia type: new and requires workup  RSV infection: new and requires workup      Disposition  Final diagnoses:   Iron deficiency anemia, unspecified iron deficiency anemia type   Fatigue   RSV infection     Time reflects when diagnosis was documented in both MDM as applicable and the Disposition within this note     Time User Action Codes Description Comment    11/4/2022 12:13 PM Soraya Navarro Add [D50 9] Iron deficiency anemia, unspecified iron deficiency anemia type     11/4/2022 12:13 PM Jazmíneber Reynoso Add [R53 83] Fatigue     11/4/2022  1:10 PM Jazmíneber Reynoso Add [J21 0] RSV (acute bronchiolitis due to respiratory syncytial virus)     11/4/2022  1:10 PM Jazmín Reynoso Remove [J21 0] RSV (acute bronchiolitis due to respiratory syncytial virus)     11/4/2022  1:10 PM Jazmíneber Reynoso Add [B33 8] RSV infection       ED Disposition     ED Disposition   Discharge    Condition   Stable    Date/Time   Fri Nov 4, 2022  1:10 PM    Comment   Syd Pressley discharge to home/self care  Follow-up Information     Follow up With Specialties Details Why [address] [place] [address], [address] [facility]   [address] [place] [address] Rd  Allie Rickcharisse [address]      Nico Poe MD Oncology, Hematology and Oncology, Hematology Go to  Go to schedule the appointment  [address] [facility] [address]  [phone]            Discharge Medication List as of 11/4/2022  1:16 PM      START taking these medications    Details   iron polysaccharides (FERREX) 150 mg capsule Take 1 capsule (150 mg total) by mouth 2 (two) times a day for 15 days, Starting Fri 11/4/2022, Until Sat 11/19/2022, Normal         CONTINUE these medications which have NOT CHANGED    Details   Multiple Vitamin tablet Take by mouth, Historical Med      NIFEdipine 0 3%-lidocaine 5% rectal ointment Apply 1 application topically every 4 (four) hours as needed for discomfort or pain Apply a small amount to anal fissure, Starting Fri 2/25/2022, Print             No discharge procedures on file      PDMP Review     None          ED Provider  Electronically Signed by           Favian Fisher PA-C  11/04/22 6691

## 2022-11-04 NOTE — Clinical Note
Holden Marks was seen and treated in our emergency department on 11/4/2022  Diagnosis:     Aleksandar Chiang  may return to work on return date  She may return on this date: 11/09/2022         If you have any questions or concerns, please don't hesitate to call        Lauri Claire PA-C    ______________________________           _______________          _______________  Hospital Representative                              Date                                Time

## 2022-11-04 NOTE — TELEPHONE ENCOUNTER
Patient calling in requesting a virtual consult visit with Dr Jaret Monroe  The patient states she feels sick and fatigue since yesterday 11/4 and symptoms are not better  Patient states it may be due to low iron  I advised the patient that Dr Jaret Monroe does not participate in virtual appointments for consultation  Informed the patient if she feels  she needs medical treatment she should go to the Emergency department  Patient verbalized understanding       Patient consult is on 11/28

## 2022-11-17 ENCOUNTER — TELEPHONE (OUTPATIENT)
Dept: HEMATOLOGY ONCOLOGY | Facility: CLINIC | Age: 57
End: 2022-11-17

## 2022-11-17 NOTE — TELEPHONE ENCOUNTER
11/17/22    LVM informing I r/s pt's consult with Quiana Brown d/t connie conflict  Apologized for any inconvenience it may cause  My name and [facility] number provided  Pt was advised to call us back if the date/ time doesn't work for her      - Pt can be r/s to see Dr Alycia De La Rosa if pt requests to see a MD

## 2022-12-22 ENCOUNTER — TELEPHONE (OUTPATIENT)
Dept: HEMATOLOGY ONCOLOGY | Facility: CLINIC | Age: 57
End: 2022-12-22

## 2022-12-22 NOTE — TELEPHONE ENCOUNTER
Reached out to patient to reschedule appointment, left voicemail with [facility] number   Referral has been closed and letter has been mailed out

## 2023-01-27 ENCOUNTER — TELEMEDICINE (OUTPATIENT)
Dept: FAMILY MEDICINE CLINIC | Facility: CLINIC | Age: 58
End: 2023-01-27

## 2023-01-27 DIAGNOSIS — J01.00 ACUTE NON-RECURRENT MAXILLARY SINUSITIS: Primary | ICD-10-CM

## 2023-01-27 RX ORDER — AZITHROMYCIN 250 MG/1
TABLET, FILM COATED ORAL
Qty: 6 TABLET | Refills: 0 | Status: SHIPPED | OUTPATIENT
Start: 2023-01-27 | End: 2023-02-01

## 2023-01-27 RX ORDER — BENZONATATE 200 MG/1
200 CAPSULE ORAL 3 TIMES DAILY PRN
Qty: 20 CAPSULE | Refills: 0 | Status: SHIPPED | OUTPATIENT
Start: 2023-01-27

## 2023-01-27 NOTE — LETTER
January 27, 2023     Patient: Oneyda Joaquin  YOB: 1965  Date of Visit: 1/27/2023      To Whom it May Concern: Cande Woodruff is under my professional care  Chatostephanie Bhavana was seen in my office on 1/27/2023  Caterinatsering Bateman may return to work on 1/30/2023  If you have any questions or concerns, please don't hesitate to call           Sincerely,          HIRAM Valle        CC: No Recipients

## 2023-01-30 ENCOUNTER — TELEPHONE (OUTPATIENT)
Dept: GASTROENTEROLOGY | Facility: CLINIC | Age: 58
End: 2023-01-30

## 2023-01-30 NOTE — TELEPHONE ENCOUNTER
I spoke with patient, she's not actively bleeding or having gi symtpoms  She was previously referred to hematology for iron deficiency anemia  She will contact Hematology [facility] to schedule an appointment

## 2023-01-30 NOTE — TELEPHONE ENCOUNTER
Patients GI provider:  Dr Sherryle Bees    Number to return call: [phone]      Reason for call: Pt calling stating she is experiencing low iron and fatigue      Scheduled procedure/appointment date if applicable: N/A

## 2023-02-14 ENCOUNTER — HOSPITAL ENCOUNTER (EMERGENCY)
Facility: HOSPITAL | Age: 58
Discharge: HOME | End: 2023-02-14
Attending: EMERGENCY MEDICINE

## 2023-02-14 ENCOUNTER — APPOINTMENT (OUTPATIENT)
Dept: RADIOLOGY | Facility: HOSPITAL | Age: 58
End: 2023-02-14

## 2023-02-14 ENCOUNTER — OFFICE VISIT (OUTPATIENT)
Dept: FAMILY MEDICINE CLINIC | Facility: CLINIC | Age: 58
End: 2023-02-14

## 2023-02-14 VITALS
TEMPERATURE: 98.3 F | WEIGHT: 121 LBS | OXYGEN SATURATION: 100 % | RESPIRATION RATE: 16 BRPM | BODY MASS INDEX: 23.75 KG/M2 | DIASTOLIC BLOOD PRESSURE: 78 MMHG | HEART RATE: 92 BPM | HEIGHT: 60 IN | SYSTOLIC BLOOD PRESSURE: 118 MMHG

## 2023-02-14 VITALS
WEIGHT: 121.8 LBS | HEIGHT: 60 IN | DIASTOLIC BLOOD PRESSURE: 74 MMHG | SYSTOLIC BLOOD PRESSURE: 102 MMHG | TEMPERATURE: 97.8 F | RESPIRATION RATE: 14 BRPM | BODY MASS INDEX: 23.91 KG/M2 | OXYGEN SATURATION: 97 % | HEART RATE: 105 BPM

## 2023-02-14 DIAGNOSIS — D64.9 FATIGUE ASSOCIATED WITH ANEMIA: Primary | ICD-10-CM

## 2023-02-14 DIAGNOSIS — R07.9 CHEST PAIN WITH LOW RISK OF ACUTE CORONARY SYNDROME: ICD-10-CM

## 2023-02-14 DIAGNOSIS — D50.9 IRON DEFICIENCY ANEMIA, UNSPECIFIED IRON DEFICIENCY ANEMIA TYPE: ICD-10-CM

## 2023-02-14 LAB
ALBUMIN SERPL BCP-MCNC: 4 G/DL (ref 3.5–5)
ALP SERPL-CCNC: 106 U/L (ref 34–104)
ALT SERPL W P-5'-P-CCNC: 25 U/L (ref 7–52)
ANION GAP SERPL CALCULATED.3IONS-SCNC: 7 MMOL/L (ref 4–13)
AST SERPL W P-5'-P-CCNC: 27 U/L (ref 13–39)
ATRIAL RATE: 81 BPM
BASOPHILS # BLD AUTO: 0.02 THOUSANDS/ÂΜL (ref 0–0.1)
BASOPHILS NFR BLD AUTO: 0 % (ref 0–1)
BILIRUB SERPL-MCNC: 0.3 MG/DL (ref 0.2–1)
BUN SERPL-MCNC: 16 MG/DL (ref 5–25)
CALCIUM SERPL-MCNC: 8.6 MG/DL (ref 8.4–10.2)
CARDIAC TROPONIN I PNL SERPL HS: 3 NG/L
CHLORIDE SERPL-SCNC: 103 MMOL/L (ref 96–108)
CO2 SERPL-SCNC: 27 MMOL/L (ref 21–32)
CREAT SERPL-MCNC: 0.74 MG/DL (ref 0.6–1.3)
EOSINOPHIL # BLD AUTO: 0.18 THOUSAND/ÂΜL (ref 0–0.61)
EOSINOPHIL NFR BLD AUTO: 3 % (ref 0–6)
ERYTHROCYTE [DISTWIDTH] IN BLOOD BY AUTOMATED COUNT: 20.5 % (ref 11.6–15.1)
GFR SERPL CREATININE-BSD FRML MDRD: 88 ML/MIN/1.73SQ M
GLUCOSE SERPL-MCNC: 89 MG/DL (ref 65–140)
HCT VFR BLD AUTO: 32.3 % (ref 34.8–46.1)
HGB BLD-MCNC: 9 G/DL (ref 11.5–15.4)
IMM GRANULOCYTES # BLD AUTO: 0.01 THOUSAND/UL (ref 0–0.2)
IMM GRANULOCYTES NFR BLD AUTO: 0 % (ref 0–2)
LYMPHOCYTES # BLD AUTO: 1.42 THOUSANDS/ÂΜL (ref 0.6–4.47)
LYMPHOCYTES NFR BLD AUTO: 26 % (ref 14–44)
MCH RBC QN AUTO: 17.3 PG (ref 26.8–34.3)
MCHC RBC AUTO-ENTMCNC: 27.9 G/DL (ref 31.4–37.4)
MCV RBC AUTO: 62 FL (ref 82–98)
MONOCYTES # BLD AUTO: 0.6 THOUSAND/ÂΜL (ref 0.17–1.22)
MONOCYTES NFR BLD AUTO: 11 % (ref 4–12)
NEUTROPHILS # BLD AUTO: 3.14 THOUSANDS/ÂΜL (ref 1.85–7.62)
NEUTS SEG NFR BLD AUTO: 60 % (ref 43–75)
NRBC BLD AUTO-RTO: 0 /100 WBCS
P AXIS: 67 DEGREES
PLATELET # BLD AUTO: 438 THOUSANDS/UL (ref 149–390)
PMV BLD AUTO: 9.3 FL (ref 8.9–12.7)
POTASSIUM SERPL-SCNC: 3.8 MMOL/L (ref 3.5–5.3)
PR INTERVAL: 154 MS
PROT SERPL-MCNC: 7.1 G/DL (ref 6.4–8.4)
QRS AXIS: 21 DEGREES
QRSD INTERVAL: 78 MS
QT INTERVAL: 372 MS
QTC INTERVAL: 432 MS
RBC # BLD AUTO: 5.19 MILLION/UL (ref 3.81–5.12)
SODIUM SERPL-SCNC: 137 MMOL/L (ref 135–147)
T WAVE AXIS: 79 DEGREES
VENTRICULAR RATE: 81 BPM
WBC # BLD AUTO: 5.37 THOUSAND/UL (ref 4.31–10.16)

## 2023-02-14 NOTE — PROGRESS NOTES
[facility]        NAME: Edmund Jefferson is a 59 y o  female  : 1965    MRN: [id number]  DATE: 2023  TIME: 10:13 AM    Assessment and Plan   Fatigue associated with anemia [D64 9]  1  Fatigue associated with anemia        2  Iron deficiency anemia, unspecified iron deficiency anemia type              Patient Instructions     Patient Instructions   F/up with hematology as scheduled for iron infusion  Go to ED if symptoms worsen  Call with problems/concerns          Chief Complaint     Chief Complaint   Patient presents with   • Fatigue     Low energy, iron depletion- hematologist advised pcp consult         History of Present Illness       C/o fatigue, weakness, leg cramps, shortness of breath  Patient has a hx of iron deficient anemia-has had these symptoms for months-worse over the past couple days  Chest pain yesterday-not present today-patient refused to have EKG done today in office  She felt like she was going to pass out yesterday  She was referred to hematology at previous visits for iron infusions  She had appointment with hematologist at [place] and is scheduled this 23 for her first infusion  Patient called hematologist yesterday to try to get infusion sooner due to her worsening symptoms and she states the office told her to f/up with PCP  She called hematologist office while she was in the room to ask again about moving up her infusion date-the office staff took her information and said she would be contacted by a nurse  Patient left office to await call back from hematologist  Patient instructed to go to ED for further evaluation of her worsening symptoms  Review of Systems   Review of Systems   Constitutional: Positive for activity change and fatigue  HENT: Negative  Eyes: Negative for visual disturbance  Respiratory: Positive for shortness of breath  Cardiovascular: Positive for chest pain  Negative for leg swelling  Chest pain yesterday-refused EKG   Genitourinary: Negative for difficulty urinating  Musculoskeletal: Positive for myalgias  Neurological: Positive for weakness (generalized) and light-headedness           Current Medications       Current Outpatient Medications:   •  esomeprazole (NexIUM) 20 mg capsule, Take 20 mg by mouth every morning before breakfast, Disp: , Rfl:   •  Multiple Vitamin tablet, Take by mouth, Disp: , Rfl:   •  benzonatate (TESSALON) 200 MG capsule, Take 1 capsule (200 mg total) by mouth 3 (three) times a day as needed for cough (Patient not taking: Reported on 2/14/2023), Disp: 20 capsule, Rfl: 0  •  iron polysaccharides (FERREX) 150 mg capsule, Take 1 capsule (150 mg total) by mouth 2 (two) times a day for 15 days, Disp: 30 capsule, Rfl: 0  •  NIFEdipine 0 3%-lidocaine 5% rectal ointment, Apply 1 application topically every 4 (four) hours as needed for discomfort or pain Apply a small amount to anal fissure (Patient not taking: Reported on 1/27/2023), Disp: 30 g, Rfl: 2    Current Allergies     Allergies as of 02/14/2023   • (No Known Allergies)            The following portions of the patient's history were reviewed and updated as appropriate: allergies, current medications, past family history, past medical history, past social history, past surgical history and problem list      Past Medical History:   Diagnosis Date   • Roque's esophagus    • Low iron        Past Surgical History:   Procedure Laterality Date   • CHOLECYSTECTOMY     • COLON SURGERY     • COLONOSCOPY     • GASTRIC BYPASS     • GASTROSTOMY     • HERNIA REPAIR     • TUBAL LIGATION         Family History   Problem Relation Age of Onset   • Heart disease Mother    • Diabetes Sister    • Thyroid cancer Son    • Heart disease Maternal Grandmother    • Diabetes Maternal Grandmother    • Stroke Maternal Grandmother    • ALS Maternal Grandfather    • ALS Maternal Aunt    • ALS Cousin    • Thyroid disease Sister    • Colon cancer Neg Hx    • Colon polyps Neg Hx          Medications have been verified  Objective   /74 (BP Location: Right arm, Patient Position: Sitting, Cuff Size: Standard)   Pulse 105   Temp 97 8 °F (36 6 °C)   Resp 14   Ht 5' (1 524 m)   Wt 55 2 kg (121 lb 12 8 oz)   SpO2 97%   BMI 23 79 kg/m²        Physical Exam     Physical Exam  Vitals reviewed  Constitutional:       General: She is not in acute distress  Appearance: Normal appearance  She is ill-appearing  She is not diaphoretic  HENT:      Head: Normocephalic  Eyes:      Extraocular Movements: Extraocular movements intact  Cardiovascular:      Rate and Rhythm: Normal rate and regular rhythm  Heart sounds: Normal heart sounds  No murmur heard  No friction rub  No gallop  Pulmonary:      Effort: Pulmonary effort is normal  No respiratory distress  Breath sounds: Normal breath sounds  No wheezing or rhonchi  Chest:      Chest wall: No tenderness  Abdominal:      General: There is no distension  Palpations: Abdomen is soft  Tenderness: There is no abdominal tenderness  Musculoskeletal:         General: Normal range of motion  Cervical back: Normal range of motion  Skin:     Coloration: Skin is pale  Neurological:      Mental Status: She is alert and oriented to person, place, and time  Psychiatric:         Mood and Affect: Mood normal          Behavior: Behavior normal          Thought Content:  Thought content normal          Judgment: Judgment normal

## 2023-02-14 NOTE — DISCHARGE INSTRUCTIONS
Make sure you are eating healthy 3 meals a day   Drinking lots of fluids, electrolyte drinks, exercising, resting   Follow up with your hematologist as scheduled   Return to the ER if you develop chest pain that radiates into jaw/left arm, associated with shortness of breath, difficulty breathing, heart palpitations, cough or vomit blood, blood in urine or stool, feel faint/dizzy/weak, confused, difficulty waking

## 2023-02-14 NOTE — PATIENT INSTRUCTIONS
F/up with hematology as scheduled for iron infusion  Go to ED if symptoms worsen  Call with problems/concerns

## 2023-02-14 NOTE — ED PROVIDER NOTES
History  Chief Complaint   Patient presents with   • Weakness - Generalized     Patient presents to ED with generalized weakness that has been worsening for months, patient reports having iron deficiency and has infusion scheduled for Friday  Patient also complaining of ear ringing, arm pain, chest pain and generalized pain  This is a 60 y/o female with PMH Roque's esophagus and iron deficiency anemia who presents to the ER today with months of fatigue  States last night she woke up to substernal chest pain that lasted a few minutes  Also admits that when she was getting in the shower she felt lightheaded like she was going to pass out  States today she just feels tired  States its hard to get out of bed somedays  She went to see her PCP today who recommended she come to the ER for evaluation of her chest pain  Does admit to a history of GERD and states she has had episodes like this due to reflux in the past  Patient denies any shortness of breath, fevers chills, sore throat, cough, body aches, abdominal pain, vomiting  Denies any thoughts of hurting herself or feeling depressed  Denies any sick contacts, recent travel, recent hospitalizations or surgeries  History provided by:  Patient   used: No    Chest Pain  Pain location:  Substernal area  Pain quality: burning and sharp    Pain radiates to the back: no    Pain severity:  Mild  Onset quality:  Sudden  Progression:  Resolved  Chronicity:  Recurrent  Relieved by:  Rest  Associated symptoms: fatigue and nausea    Associated symptoms: no abdominal pain, no fever, no headache, no shortness of breath and not vomiting        Prior to Admission Medications   Prescriptions Last Dose Informant Patient Reported? Taking?    Cholecalciferol (D3 ADULT PO)   Yes Yes   Sig: Take by mouth   Multiple Vitamin tablet   Yes No   Sig: Take by mouth   NIFEdipine 0 3%-lidocaine 5% rectal ointment   No No   Sig: Apply 1 application topically every 4 (four) hours as needed for discomfort or pain Apply a small amount to anal fissure   Patient not taking: Reported on 1/27/2023   benzonatate (TESSALON) 200 MG capsule   No No   Sig: Take 1 capsule (200 mg total) by mouth 3 (three) times a day as needed for cough   Patient not taking: Reported on 2/14/2023   esomeprazole (NexIUM) 20 mg capsule   Yes No   Sig: Take 20 mg by mouth every morning before breakfast   iron polysaccharides (FERREX) 150 mg capsule   No No   Sig: Take 1 capsule (150 mg total) by mouth 2 (two) times a day for 15 days      Facility-Administered Medications: None       Past Medical History:   Diagnosis Date   • Roque's esophagus    • Low iron        Past Surgical History:   Procedure Laterality Date   • CHOLECYSTECTOMY     • COLON SURGERY     • COLONOSCOPY     • GASTRIC BYPASS     • GASTROSTOMY     • HERNIA REPAIR     • TUBAL LIGATION         Family History   Problem Relation Age of Onset   • Heart disease Mother    • Diabetes Sister    • Thyroid cancer Son    • Heart disease Maternal Grandmother    • Diabetes Maternal Grandmother    • Stroke Maternal Grandmother    • ALS Maternal Grandfather    • ALS Maternal Aunt    • ALS Cousin    • Thyroid disease Sister    • Colon cancer Neg Hx    • Colon polyps Neg Hx      I have reviewed and agree with the history as documented  E-Cigarette/Vaping   • E-Cigarette Use Never User      E-Cigarette/Vaping Substances     Social History     Tobacco Use   • Smoking status: Never   • Smokeless tobacco: Never   Vaping Use   • Vaping Use: Never used   Substance Use Topics   • Alcohol use: Not Currently   • Drug use: Not Currently       Review of Systems   Constitutional: Positive for fatigue  Negative for chills and fever  HENT: Negative for congestion, rhinorrhea and sore throat  Respiratory: Negative for chest tightness and shortness of breath  Cardiovascular: Positive for chest pain  Gastrointestinal: Positive for nausea   Negative for abdominal pain, blood in stool, constipation, diarrhea and vomiting  Genitourinary: Negative for difficulty urinating, dysuria, frequency and urgency  Skin: Negative for color change  Neurological: Positive for light-headedness  Negative for headaches  Psychiatric/Behavioral: Negative for behavioral problems and sleep disturbance  All other systems reviewed and are negative  Physical Exam  Physical Exam  Vitals and nursing note reviewed  Constitutional:       General: She is awake  Appearance: Normal appearance  She is well-developed  HENT:      Head: Normocephalic and atraumatic  Right Ear: Tympanic membrane, ear canal and external ear normal       Left Ear: Tympanic membrane, ear canal and external ear normal       Nose: Nose normal       Mouth/Throat:      Mouth: Mucous membranes are moist       Pharynx: Oropharynx is clear  Eyes:      General: No scleral icterus  Extraocular Movements: Extraocular movements intact  Conjunctiva/sclera: Conjunctivae normal       Pupils: Pupils are equal, round, and reactive to light  Comments: Pale conjunctivae   Cardiovascular:      Rate and Rhythm: Normal rate and regular rhythm  Heart sounds: Normal heart sounds, S1 normal and S2 normal  No murmur heard  No gallop  Pulmonary:      Effort: Pulmonary effort is normal       Breath sounds: Normal breath sounds  No wheezing, rhonchi or rales  Abdominal:      General: Abdomen is flat  Palpations: Abdomen is soft  Tenderness: There is no abdominal tenderness  There is no guarding or rebound  Musculoskeletal:         General: Normal range of motion  Cervical back: Normal range of motion  Right lower leg: No edema  Left lower leg: No edema  Skin:     General: Skin is warm and dry  Coloration: Skin is pale  Neurological:      General: No focal deficit present  Mental Status: She is alert     Psychiatric:         Attention and Perception: Attention and perception normal          Mood and Affect: Mood normal          Behavior: Behavior normal  Behavior is cooperative           Vital Signs  ED Triage Vitals   Temperature Pulse Respirations Blood Pressure SpO2   02/14/23 1056 02/14/23 1058 02/14/23 1056 02/14/23 1058 02/14/23 1058   98 3 °F (36 8 °C) 92 16 118/78 100 %      Temp Source Heart Rate Source Patient Position - Orthostatic VS BP Location FiO2 (%)   02/14/23 1056 02/14/23 1058 02/14/23 1058 02/14/23 1058 --   Oral Monitor Sitting Left arm       Pain Score       02/14/23 1058       No Pain           Vitals:    02/14/23 1058   BP: 118/78   Pulse: 92   Patient Position - Orthostatic VS: Sitting         Visual Acuity      ED Medications  Medications - No data to display    Diagnostic Studies  Results Reviewed     Procedure Component Value Units Date/Time    HS Troponin 0hr (reflex protocol) [[id number]]  (Normal) Collected: 02/14/23 1110    Lab Status: Final result Specimen: Blood from Arm, Right Updated: 02/14/23 1141     hs TnI 0hr 3 ng/L     Comprehensive metabolic panel [[id number]]  (Abnormal) Collected: 02/14/23 1110    Lab Status: Final result Specimen: Blood from Arm, Right Updated: 02/14/23 1135     Sodium 137 mmol/L      Potassium 3 8 mmol/L      Chloride 103 mmol/L      CO2 27 mmol/L      ANION GAP 7 mmol/L      BUN 16 mg/dL      Creatinine 0 74 mg/dL      Glucose 89 mg/dL      Calcium 8 6 mg/dL      AST 27 U/L      ALT 25 U/L      Alkaline Phosphatase 106 U/L      Total Protein 7 1 g/dL      Albumin 4 0 g/dL      Total Bilirubin 0 30 mg/dL      eGFR 88 ml/min/1 73sq m     Narrative:      Akanksha guidelines for Chronic Kidney Disease (CKD):   •  Stage 1 with normal or high GFR (GFR > 90 mL/min/1 73 square meters)  •  Stage 2 Mild CKD (GFR = 60-89 mL/min/1 73 square meters)  •  Stage 3A Moderate CKD (GFR = 45-59 mL/min/1 73 square meters)  •  Stage 3B Moderate CKD (GFR = 30-44 mL/min/1 73 square meters)  •  Stage 4 Severe CKD (GFR = 15-29 mL/min/1 73 square meters)  •  Stage 5 End Stage CKD (GFR <15 mL/min/1 73 square meters)  Note: GFR calculation is accurate only with a steady state creatinine    CBC and differential [[id number]]  (Abnormal) Collected: 02/14/23 1110    Lab Status: Final result Specimen: Blood from Arm, Right Updated: 02/14/23 1118     WBC 5 37 Thousand/uL      RBC 5 19 Million/uL      Hemoglobin 9 0 g/dL      Hematocrit 32 3 %      MCV 62 fL      MCH 17 3 pg      MCHC 27 9 g/dL      RDW 20 5 %      MPV 9 3 fL      Platelets 244 Thousands/uL      nRBC 0 /100 WBCs      Neutrophils Relative 60 %      Immat GRANS % 0 %      Lymphocytes Relative 26 %      Monocytes Relative 11 %      Eosinophils Relative 3 %      Basophils Relative 0 %      Neutrophils Absolute 3 14 Thousands/µL      Immature Grans Absolute 0 01 Thousand/uL      Lymphocytes Absolute 1 42 Thousands/µL      Monocytes Absolute 0 60 Thousand/µL      Eosinophils Absolute 0 18 Thousand/µL      Basophils Absolute 0 02 Thousands/µL                  XR chest 2 views   Final Result by Keisha Bautista MD (02/14 1327)      No acute cardiopulmonary disease        Findings are stable            Workstation performed: [id number]                    Procedures  ECG 12 Lead Documentation Only    Date/Time: 2/14/2023 11:05 AM  Performed by: Juarez England PA-C  Authorized by: Juarez England PA-C     Indications / Diagnosis:  Chest pain   Patient location:  ED  Previous ECG:     Previous ECG:  Unavailable  Interpretation:     Interpretation: normal    Rate:     ECG rate:  81    ECG rate assessment: normal    Rhythm:     Rhythm: sinus rhythm    Comments:      Normal sinus rhythm with no signs of acute ischemia, ectopy or arrhythmias              ED Course  ED Course as of 02/14/23 1849   Tue Feb 14, 2023   1426 Hemoglobin(!): 9 0  9 3 3 months ago, stable chronic anemia              HEART Risk Score    Flowsheet Row Most Recent Value   Heart Score Risk Calculator History 0 Filed at: 02/14/2023 1846   ECG 0 Filed at: 02/14/2023 1846   Age 1 Filed at: 02/14/2023 1846   Risk Factors 0 Filed at: 02/14/2023 1846   Troponin 0 Filed at: 02/14/2023 1846   HEART Score 1 Filed at: 02/14/2023 1846                        SBIRT 22yo+    Flowsheet Row Most Recent Value   SBIRT (23 yo +)    In order to provide better care to our patients, we are screening all of our patients for alcohol and drug use  Would it be okay to ask you these screening questions? Yes Filed at: 02/14/2023 1100   Initial Alcohol Screen: US AUDIT-C     1  How often do you have a drink containing alcohol? 0 Filed at: 02/14/2023 1100   2  How many drinks containing alcohol do you have on a typical day you are drinking? 0 Filed at: 02/14/2023 1100   3b  FEMALE Any Age, or MALE 65+: How often do you have 4 or more drinks on one occassion? 0 Filed at: 02/14/2023 1100   Audit-C Score 0 Filed at: 02/14/2023 1100   BLAIR: How many times in the past year have you    Used an illegal drug or used a prescription medication for non-medical reasons? Never Filed at: 02/14/2023 1100                    Medical Decision Making  58 y/o female here for fatigue for months, chest pain occurred last night  Differential diagnosis: ACS, angina, anemia, walking pneumonia, electrolyte disturbance  Assessment: fatigue likely associated with anemia, chest pain low risk for ACS  Plan: workup shows chronic anemia, Hgb > 7  Vitals stable  Otherwise workup negative  Heart score of 1  Patient told to follow up with her hematologist for her iron infusion on Friday as scheduled  She was given care instructions for anemia  She  was given strict return to ER precautions both verbally and in discharge papers  Patient verbalized understanding and agrees with plan       Chest pain with low risk of acute coronary syndrome: acute illness or injury  Fatigue associated with anemia: acute illness or injury  Amount and/or Complexity of Data Reviewed  Labs: ordered  Decision-making details documented in ED Course  Radiology: ordered  Disposition  Final diagnoses:   Fatigue associated with anemia   Chest pain with low risk of acute coronary syndrome     Time reflects when diagnosis was documented in both MDM as applicable and the Disposition within this note     Time User Action Codes Description Comment    2/14/2023  2:26 PM Med Daniel Add [D64 9] Fatigue associated with anemia     2/14/2023  2:27 PM Med Daniel Add [R07 9,  G89 29,  Z91 89] Chronic chest pain with low to moderate risk for coronary artery disease     2/14/2023  2:27 PM Mednaveen Goddarder Remove [R07 9,  G89 29,  Z91 89] Chronic chest pain with low to moderate risk for coronary artery disease     2/14/2023  2:27 PM Med Daniel Add [R07 9] Chest pain with low risk of acute coronary syndrome       ED Disposition     ED Disposition   Discharge    Condition   Stable    Date/Time   Tue Feb 14, 2023  2:26 PM    Comment   Shahzad [facility] discharge to home/self care                 Follow-up Information     Follow up With Specialties Details Why Contact Info Additional Information    Cheron Fabry, [place] [facility] Medicine Schedule an appointment as soon as possible for a visit   [address] [facility] [address]  Emergency Department Emergency Medicine Go to  if you develop chest pain that radiates into jaw/left arm, associated with shortness of breath, difficulty breathing, heart palpitations, cough or vomit blood, blood in urine or stool, feel faint/dizzy/weak, confused, difficulty waking [address] [place],[address] Emergency Department, [address] [facility] Tena Contreras [address]          Discharge Medication List as of 2/14/2023  2:30 PM      CONTINUE these medications which have NOT CHANGED    Details   Cholecalciferol (D3 ADULT PO) Take by mouth, Historical Med      benzonatate (TESSALON) 200 MG capsule Take 1 capsule (200 mg total) by mouth 3 (three) times a day as needed for cough, Starting Fri 1/27/2023, Normal      esomeprazole (NexIUM) 20 mg capsule Take 20 mg by mouth every morning before breakfast, Historical Med      iron polysaccharides (FERREX) 150 mg capsule Take 1 capsule (150 mg total) by mouth 2 (two) times a day for 15 days, Starting Fri 11/4/2022, Until Sat 11/19/2022, Normal      Multiple Vitamin tablet Take by mouth, Historical Med      NIFEdipine 0 3%-lidocaine 5% rectal ointment Apply 1 application topically every 4 (four) hours as needed for discomfort or pain Apply a small amount to anal fissure, Starting Fri 2/25/2022, Print             No discharge procedures on file      PDMP Review     None          ED Provider  Electronically Signed by           Jennifer Bergeron PA-C  02/14/23 6645

## 2023-02-21 ENCOUNTER — TELEPHONE (OUTPATIENT)
Dept: FAMILY MEDICINE CLINIC | Facility: CLINIC | Age: 58
End: 2023-02-21

## 2023-02-21 NOTE — TELEPHONE ENCOUNTER
Patient called in because she had an iron infusion last Friday and her job wants her to return back  Patient is stating that she still doesn't feel well  She's still exhausted and overall feels unstable  She wants to know shouldn't she feel better since it's been 4-5 days?  Or is this not normal?

## 2024-01-02 ENCOUNTER — OFFICE VISIT (OUTPATIENT)
Dept: FAMILY MEDICINE CLINIC | Facility: CLINIC | Age: 59
End: 2024-01-02
Payer: COMMERCIAL

## 2024-01-02 VITALS
BODY MASS INDEX: 26.11 KG/M2 | HEIGHT: 60 IN | OXYGEN SATURATION: 100 % | WEIGHT: 133 LBS | HEART RATE: 65 BPM | SYSTOLIC BLOOD PRESSURE: 101 MMHG | TEMPERATURE: 95.3 F | DIASTOLIC BLOOD PRESSURE: 70 MMHG

## 2024-01-02 DIAGNOSIS — Z12.31 ENCOUNTER FOR SCREENING MAMMOGRAM FOR BREAST CANCER: ICD-10-CM

## 2024-01-02 DIAGNOSIS — M79.672 LEFT FOOT PAIN: ICD-10-CM

## 2024-01-02 DIAGNOSIS — E28.39 OVARIAN FAILURE DUE TO MENOPAUSE: ICD-10-CM

## 2024-01-02 DIAGNOSIS — Z00.00 ANNUAL PHYSICAL EXAM: Primary | ICD-10-CM

## 2024-01-02 DIAGNOSIS — M54.6 ACUTE BILATERAL THORACIC BACK PAIN: ICD-10-CM

## 2024-01-02 PROCEDURE — 99396 PREV VISIT EST AGE 40-64: CPT | Performed by: NURSE PRACTITIONER

## 2024-01-02 PROCEDURE — 99214 OFFICE O/P EST MOD 30 MIN: CPT | Performed by: NURSE PRACTITIONER

## 2024-01-02 NOTE — PATIENT INSTRUCTIONS
Fasting labs and mammogram as ordered  Ibuprofen as directed for foot and back pain  Wear insert in shoes for added support/comfort  F/up with podiatry for ongoing foot pain  F/up with PT for ongoing back pain and posture problem  Call with problems/concerns      Wellness Visit for Adults   AMBULATORY CARE:   A wellness visit  is when you see your healthcare provider to get screened for health problems. Your healthcare provider will also give you advice on how to stay healthy. Write down your questions so you remember to ask them. Ask your healthcare provider how often you should have a wellness visit.  What happens at a wellness visit:  Your healthcare provider will ask about your health, and your family history of health problems. This includes high blood pressure, heart disease, and cancer. He or she will ask if you have symptoms that concern you, if you smoke, and about your mood. You may also be asked about your intake of medicines, supplements, food, and alcohol. Any of the following may be done:  Your weight  will be checked. Your height may also be checked so your body mass index (BMI) can be calculated. Your BMI shows if you are at a healthy weight.    Your blood pressure  and heart rate will be checked. Your temperature may also be checked.    Blood and urine tests  may be done. Blood tests may be done to check your cholesterol levels. Abnormal cholesterol levels increase your risk for heart disease and stroke. You may also need a blood or urine test to check for diabetes if you are at increased risk. Urine tests may be done to look for signs of an infection or kidney disease.    A physical exam  includes checking your heartbeat and lungs with a stethoscope. Your healthcare provider may also check your skin to look for sun damage.    Screening tests  may be recommended. A screening test is done to check for diseases that may not cause symptoms. The screening tests you may need depend on your age, gender,  family history, and lifestyle habits. For example, colorectal screening may be recommended if you are 50 years old or older.    Screening tests you need if you are a woman:   A Pap smear  is used to screen for cervical cancer. Pap smears are usually done every 3 to 5 years depending on your age. You may need them more often if you have had abnormal Pap smear test results in the past. Ask your healthcare provider how often you should have a Pap smear.    A mammogram  is an x-ray of your breasts to screen for breast cancer. Experts recommend mammograms every 2 years starting at age 50 years. You may need a mammogram at age 49 years or younger if you have an increased risk for breast cancer. Talk to your healthcare provider about when you should start having mammograms and how often you need them.    Vaccines you may need:   Get an influenza vaccine  every year. The influenza vaccine protects you from the flu. Several types of viruses cause the flu. The viruses change over time, so new vaccines are made each year.    Get a tetanus-diphtheria (Td) booster vaccine  every 10 years. This vaccine protects you against tetanus and diphtheria. Tetanus is a severe infection that may cause painful muscle spasms and lockjaw. Diphtheria is a severe bacterial infection that causes a thick covering in the back of your mouth and throat.    Get a human papillomavirus (HPV) vaccine  if you are female and aged 19 to 26 or male 19 to 21 and never received it. This vaccine protects you from HPV infection. HPV is the most common infection spread by sexual contact. HPV may also cause vaginal, penile, and anal cancers.    Get a pneumococcal vaccine  if you are aged 65 years or older. The pneumococcal vaccine is an injection given to protect you from pneumococcal disease. Pneumococcal disease is an infection caused by pneumococcal bacteria. The infection may cause pneumonia, meningitis, or an ear infection.    Get a shingles vaccine  if you  are 60 or older, even if you have had shingles before. The shingles vaccine is an injection to protect you from the varicella-zoster virus. This is the same virus that causes chickenpox. Shingles is a painful rash that develops in people who had chickenpox or have been exposed to the virus.    How to eat healthy:  My Plate is a model for planning healthy meals. It shows the types and amounts of foods that should go on your plate. Fruits and vegetables make up about half of your plate, and grains and protein make up the other half. A serving of dairy is included on the side of your plate. The amount of calories and serving sizes you need depends on your age, gender, weight, and height. Examples of healthy foods are listed below:  Eat a variety of vegetables  such as dark green, red, and orange vegetables. You can also include canned vegetables low in sodium (salt) and frozen vegetables without added butter or sauces.    Eat a variety of fresh fruits , canned fruit in 100% juice, frozen fruit, and dried fruit.    Include whole grains.  At least half of the grains you eat should be whole grains. Examples include whole-wheat bread, wheat pasta, brown rice, and whole-grain cereals such as oatmeal.    Eat a variety of protein foods such as seafood (fish and shellfish), lean meat, and poultry without skin (turkey and chicken). Examples of lean meats include pork leg, shoulder, or tenderloin, and beef round, sirloin, tenderloin, and extra lean ground beef. Other protein foods include eggs and egg substitutes, beans, peas, soy products, nuts, and seeds.    Choose low-fat dairy products such as skim or 1% milk or low-fat yogurt, cheese, and cottage cheese.    Limit unhealthy fats  such as butter, hard margarine, and shortening.       Exercise:  Exercise at least 30 minutes per day on most days of the week. Some examples of exercise include walking, biking, dancing, and swimming. You can also fit in more physical activity by  taking the stairs instead of the elevator or parking farther away from stores. Include muscle strengthening activities 2 days each week. Regular exercise provides many health benefits. It helps you manage your weight, and decreases your risk for type 2 diabetes, heart disease, stroke, and high blood pressure. Exercise can also help improve your mood. Ask your healthcare provider about the best exercise plan for you.       General health and safety guidelines:   Do not smoke.  Nicotine and other chemicals in cigarettes and cigars can cause lung damage. Ask your healthcare provider for information if you currently smoke and need help to quit. E-cigarettes or smokeless tobacco still contain nicotine. Talk to your healthcare provider before you use these products.    Limit alcohol.  A drink of alcohol is 12 ounces of beer, 5 ounces of wine, or 1½ ounces of liquor.    Lose weight, if needed.  Being overweight increases your risk of certain health conditions. These include heart disease, high blood pressure, type 2 diabetes, and certain types of cancer.    Protect your skin.  Do not sunbathe or use tanning beds. Use sunscreen with a SPF 15 or higher. Apply sunscreen at least 15 minutes before you go outside. Reapply sunscreen every 2 hours. Wear protective clothing, hats, and sunglasses when you are outside.    Drive safely.  Always wear your seatbelt. Make sure everyone in your car wears a seatbelt. A seatbelt can save your life if you are in an accident. Do not use your cell phone when you are driving. This could distract you and cause an accident. Pull over if you need to make a call or send a text message.    Practice safe sex.  Use latex condoms if are sexually active and have more than one partner. Your healthcare provider may recommend screening tests for sexually transmitted infections (STIs).    Wear helmets, lifejackets, and protective gear.  Always wear a helmet when you ride a bike or motorcycle, go skiing, or  play sports that could cause a head injury. Wear protective equipment when you play sports. Wear a lifejacket when you are on a boat or doing water sports.    © Copyright [employer] 2023 Information is for End User's use only and may not be sold, redistributed or otherwise used for commercial purposes.  The above information is an  only. It is not intended as medical advice for individual conditions or treatments. Talk to your doctor, nurse or pharmacist before following any medical regimen to see if it is safe and effective for you.

## 2024-01-02 NOTE — PROGRESS NOTES
ADULT ANNUAL PHYSICAL  [facility]    NAME: Rajani Hillman  AGE: 58 y.o. SEX: female  : 1965     DATE: 2024     Assessment and Plan:     Problem List Items Addressed This Visit    None  Visit Diagnoses       Annual physical exam    -  Primary    Relevant Orders    Comprehensive metabolic panel    Lipid panel    Left foot pain        Relevant Orders    Ambulatory Referral to Podiatry    Acute bilateral thoracic back pain        Relevant Orders    Ambulatory Referral to Physical Therapy    Encounter for screening mammogram for breast cancer        Relevant Orders    Mammo screening bilateral w 3d & cad    Ovarian failure due to menopause        Relevant Orders    DXA bone density spine hip and pelvis        Fasting labs and mammogram as ordered  Ibuprofen as directed for foot and back pain  Wear insert in shoes for added support/comfort  F/up with podiatry for ongoing foot pain  F/up with PT for ongoing back pain and posture problem  Call with problems/concerns    Immunizations and preventive care screenings were discussed with patient today. Appropriate education was printed on patient's after visit summary.    Counseling:  Alcohol/drug use: discussed moderation in alcohol intake, the recommendations for healthy alcohol use, and avoidance of illicit drug use.  Dental Health: discussed importance of regular tooth brushing, flossing, and dental visits.  Injury prevention: discussed safety/seat belts, safety helmets, smoke detectors, carbon dioxide detectors, and smoking near bedding or upholstery.  Sexual health: discussed sexually transmitted diseases, partner selection, use of condoms, avoidance of unintended pregnancy, and contraceptive alternatives.  Exercise: the importance of regular exercise/physical activity was discussed. Recommend exercise 3-5 times per week for at least 30 minutes.     BMI Counseling: Body mass index is 25.97  kg/m². The BMI is above normal. Nutrition recommendations include decreasing portion sizes and encouraging healthy choices of fruits and vegetables. Rationale for BMI follow-up plan is due to patient being overweight or obese.     Depression Screening and Follow-up Plan: Patient was screened for depression during today's encounter. They screened negative with a PHQ-2 score of 0.        Return in about 1 year (around 1/2/2025) for Annual physical.     Chief Complaint:     Chief Complaint   Patient presents with    Physical Exam    Foot Pain     Instep left foot    Back Pain     Pt can't straighten her back without it hurting.       History of Present Illness:     Adult Annual Physical   Patient here for a comprehensive physical exam. The patient reports problems - back pain, left foot pain .  C/o left ball of foot pain x 6 weeks. Pain when walking. She did get new shoes which helped some.   C/o upper back pain-feels like her posture is getting worse-unable to stand up straight without having pain.        Diet and Physical Activity  Diet/Nutrition: well balanced diet.   Exercise: no formal exercise.      Depression Screening  PHQ-2/9 Depression Screening    Little interest or pleasure in doing things: 0 - not at all  Feeling down, depressed, or hopeless: 0 - not at all  PHQ-2 Score: 0  PHQ-2 Interpretation: Negative depression screen       General Health  Sleep: sleeps well.   Hearing: normal - bilateral.  Vision: goes for regular eye exams.   Dental: regular dental visits.       /GYN Health  Follows with gynecology? yes   Patient is: postmenopausal      Advanced Care Planning  Do you have an advanced directive? no  Do you have a durable medical power of ? no     Review of Systems:     Review of Systems   Constitutional:  Negative for activity change, appetite change, chills, diaphoresis, fatigue, fever and unexpected weight change.   HENT:  Negative for congestion, dental problem, drooling, ear discharge,  ear pain, facial swelling, hearing loss, mouth sores, nosebleeds, postnasal drip, rhinorrhea, sinus pressure, sinus pain, sneezing, sore throat, tinnitus, trouble swallowing and voice change.    Eyes:  Negative for photophobia, pain, discharge, redness, itching and visual disturbance.   Respiratory:  Negative for apnea, cough, choking, chest tightness, shortness of breath, wheezing and stridor.    Cardiovascular:  Negative for chest pain, palpitations and leg swelling.   Gastrointestinal:  Negative for abdominal distention, abdominal pain, anal bleeding, blood in stool, constipation, diarrhea, nausea, rectal pain and vomiting.   Endocrine: Negative for cold intolerance, heat intolerance, polydipsia, polyphagia and polyuria.   Genitourinary:  Negative for decreased urine volume, difficulty urinating, dysuria, flank pain, hematuria, pelvic pain, urgency, vaginal bleeding, vaginal discharge and vaginal pain.   Musculoskeletal:  Positive for arthralgias and back pain. Negative for gait problem, joint swelling, myalgias, neck pain and neck stiffness.   Skin:  Negative for color change, pallor, rash and wound.   Neurological:  Negative for dizziness, tremors, seizures, syncope, facial asymmetry, speech difficulty, weakness, light-headedness, numbness and headaches.   Hematological:  Negative for adenopathy. Does not bruise/bleed easily.   Psychiatric/Behavioral:  Negative for agitation, behavioral problems, confusion, decreased concentration, dysphoric mood, hallucinations, self-injury, sleep disturbance and suicidal ideas. The patient is not nervous/anxious and is not hyperactive.       Past Medical History:     Past Medical History:   Diagnosis Date    Roque's esophagus     Low iron       Past Surgical History:     Past Surgical History:   Procedure Laterality Date    CHOLECYSTECTOMY      COLON SURGERY      COLONOSCOPY      GASTRIC BYPASS      GASTROSTOMY      HERNIA REPAIR      TUBAL LIGATION        Social History:      Social History     Socioeconomic History    Marital status: /Civil Union     Spouse name: Kiko    Number of children: 3    Years of education: None    Highest education level: None   Occupational History    Occupation:    Tobacco Use    Smoking status: Never    Smokeless tobacco: Never   Vaping Use    Vaping status: Never Used   Substance and Sexual Activity    Alcohol use: Not Currently    Drug use: Not Currently    Sexual activity: None   Other Topics Concern    None   Social History Narrative    None     Social Determinants of Health     Financial Resource Strain: Not on file   Food Insecurity: Not on file   Transportation Needs: Not on file   Physical Activity: Not on file   Stress: Not on file   Social Connections: Not on file   Intimate Partner Violence: Not on file   Housing Stability: Not on file      Family History:     Family History   Problem Relation Age of Onset    Heart disease Mother     Diabetes Sister     Thyroid cancer Son     Heart disease Maternal Grandmother     Diabetes Maternal Grandmother     Stroke Maternal Grandmother     ALS Maternal Grandfather     ALS Maternal Aunt     ALS Cousin     Thyroid disease Sister     Colon cancer Neg Hx     Colon polyps Neg Hx       Current Medications:     Current Outpatient Medications   Medication Sig Dispense Refill    esomeprazole (NexIUM) 20 mg capsule Take 20 mg by mouth every morning before breakfast      Multiple Vitamin tablet Take by mouth      benzonatate (TESSALON) 200 MG capsule Take 1 capsule (200 mg total) by mouth 3 (three) times a day as needed for cough (Patient not taking: Reported on 2/14/2023) 20 capsule 0    Cholecalciferol (D3 ADULT PO) Take by mouth (Patient not taking: Reported on 1/2/2024)      iron polysaccharides (FERREX) 150 mg capsule Take 1 capsule (150 mg total) by mouth 2 (two) times a day for 15 days 30 capsule 0    NIFEdipine 0.3%-lidocaine 5% rectal ointment Apply 1 application topically every 4 (four)  hours as needed for discomfort or pain Apply a small amount to anal fissure (Patient not taking: Reported on 1/27/2023) 30 g 2     No current facility-administered medications for this visit.      Allergies:     No Known Allergies   Physical Exam:     /70 (BP Location: Left arm, Patient Position: Sitting, Cuff Size: Standard)   Pulse 65   Temp (!) 95.3 °F (35.2 °C) (Tympanic)   Ht 5' (1.524 m)   Wt 60.3 kg (133 lb)   SpO2 100%   BMI 25.97 kg/m²     Physical Exam  Vitals and nursing note reviewed.   Constitutional:       General: She is not in acute distress.     Appearance: Normal appearance. She is normal weight. She is not ill-appearing, toxic-appearing or diaphoretic.   HENT:      Head: Normocephalic.      Right Ear: Tympanic membrane, ear canal and external ear normal. There is no impacted cerumen.      Left Ear: Tympanic membrane, ear canal and external ear normal. There is no impacted cerumen.      Nose: Nose normal. No congestion or rhinorrhea.      Mouth/Throat:      Mouth: Mucous membranes are moist.      Pharynx: Oropharynx is clear. No oropharyngeal exudate or posterior oropharyngeal erythema.   Eyes:      General: No scleral icterus.        Right eye: No discharge.         Left eye: No discharge.      Extraocular Movements: Extraocular movements intact.      Conjunctiva/sclera: Conjunctivae normal.      Pupils: Pupils are equal, round, and reactive to light.   Neck:      Vascular: No carotid bruit.   Cardiovascular:      Rate and Rhythm: Regular rhythm.      Pulses: Normal pulses.      Heart sounds: Normal heart sounds. No murmur heard.     No friction rub. No gallop.   Pulmonary:      Effort: Pulmonary effort is normal. No respiratory distress.      Breath sounds: Normal breath sounds. No stridor. No wheezing, rhonchi or rales.   Chest:      Chest wall: No tenderness.   Abdominal:      General: Abdomen is flat. Bowel sounds are normal. There is no distension.      Palpations: Abdomen is  soft. There is no mass.      Tenderness: There is no abdominal tenderness. There is no right CVA tenderness, left CVA tenderness, guarding or rebound.      Hernia: No hernia is present.   Musculoskeletal:         General: Tenderness present. No swelling, deformity or signs of injury. Normal range of motion.      Cervical back: Normal range of motion and neck supple. No rigidity or tenderness. No muscular tenderness.      Thoracic back: Tenderness present. No swelling.      Right lower leg: No swelling. No edema.      Left lower leg: No swelling. No edema.      Comments: Positive tenderness ball of foot left foot   Lymphadenopathy:      Cervical: No cervical adenopathy.   Skin:     General: Skin is warm.      Capillary Refill: Capillary refill takes less than 2 seconds.      Coloration: Skin is not jaundiced or pale.      Findings: No bruising, erythema, lesion or rash.   Neurological:      General: No focal deficit present.      Mental Status: She is alert and oriented to person, place, and time.      Cranial Nerves: No cranial nerve deficit.      Sensory: No sensory deficit.      Motor: No weakness.      Coordination: Coordination normal.      Gait: Gait normal.      Deep Tendon Reflexes: Reflexes normal.   Psychiatric:         Mood and Affect: Mood normal.         Behavior: Behavior normal.         Thought Content: Thought content normal.         Judgment: Judgment normal.          HIRAM Chairez  [facility]

## 2024-01-04 ENCOUNTER — TELEPHONE (OUTPATIENT)
Age: 59
End: 2024-01-04

## 2024-01-04 NOTE — TELEPHONE ENCOUNTER
Caller: Self    Doctor: n/a    Reason for call: Patient calling to schedule a podiatry appt, warm transferred to podiatry    Call back#: [phone]

## 2024-01-10 ENCOUNTER — APPOINTMENT (OUTPATIENT)
Dept: RADIOLOGY | Facility: CLINIC | Age: 59
End: 2024-01-10
Payer: COMMERCIAL

## 2024-01-10 ENCOUNTER — OFFICE VISIT (OUTPATIENT)
Dept: PODIATRY | Facility: CLINIC | Age: 59
End: 2024-01-10
Payer: COMMERCIAL

## 2024-01-10 VITALS
HEART RATE: 75 BPM | BODY MASS INDEX: 26.7 KG/M2 | HEIGHT: 60 IN | SYSTOLIC BLOOD PRESSURE: 108 MMHG | WEIGHT: 136 LBS | DIASTOLIC BLOOD PRESSURE: 73 MMHG

## 2024-01-10 DIAGNOSIS — L60.0 INGROWING NAIL: ICD-10-CM

## 2024-01-10 DIAGNOSIS — M79.672 LEFT FOOT PAIN: ICD-10-CM

## 2024-01-10 DIAGNOSIS — M77.52 CAPSULITIS OF METATARSOPHALANGEAL (MTP) JOINT OF LEFT FOOT: Primary | ICD-10-CM

## 2024-01-10 PROCEDURE — 73630 X-RAY EXAM OF FOOT: CPT

## 2024-01-10 PROCEDURE — 99243 OFF/OP CNSLTJ NEW/EST LOW 30: CPT | Performed by: PODIATRIST

## 2024-01-10 RX ORDER — MELOXICAM 15 MG/1
15 TABLET ORAL DAILY
Qty: 30 TABLET | Refills: 0 | Status: SHIPPED | OUTPATIENT
Start: 2024-01-10

## 2024-01-10 NOTE — LETTER
January 10, 2024     HIRAM Chairez  [address] [address]    Patient: Rajani Hillman   YOB: 1965   Date of Visit: 1/10/2024       Dear Dr. Tolbert:    Thank you for referring Rajani Hillman to me for evaluation. Below are my notes for this consultation.    If you have questions, please do not hesitate to call me. I look forward to following your patient along with you.         Sincerely,        Rainer Peng DPM        CC: No Recipients    Rainer Peng DPM  1/10/2024  3:24 PM  Sign when Signing Visit               PATIENT:  Rajani Hillman  1965       ASSESSMENT:     1. Capsulitis of metatarsophalangeal (MTP) joint of left foot        2. Left foot pain  Ambulatory Referral to Podiatry    XR foot 3+ vw left      3. Ingrowing nail                  PLAN:  1. Reviewed medical records.  Patient was counseled and educated on the condition and the diagnosis.    2. X-ray was obtained and personally reviewed.  The radiological findings were discussed with the patient.  No acute osseous injury.    3. The exam and symptoms are consistent with left 2nd MPJ capsulitis.  The diagnosis, treatment options and prognosis were discussed with the patient.    4. Strapping applied and instructed daily application.  Instructed supportive care, home exercise, icing, and proper footwear/ arch support.     5. Rx: Meloxicam.  6. No significant ingrown nail in left great toe.  Instructed proper nail care.    7. Patient will return in 5 weeks for re-evaluation.       Imaging: I have personally reviewed pertinent films in PACS  Labs, pathology, and Other Studies: I have personally reviewed pertinent reports.        Subjective:       HPI  The patient was referred to my office for evaluation of pain in left foot.  She has it since October.  Burning sensation on the ball of left foot.  Pain increases with walking and standing.  The patient does not recall any injury, but reports some overuse.  She is on  her feet all day at work.   The patient tried different shoes.  Denied any swelling. No significant weakness or dysfunction.  She also had some tenderness around toenail left great toe, but it had been better.        The following portions of the patient's history were reviewed and updated as appropriate: allergies, current medications, past family history, past medical history, past social history, past surgical history and problem list.  All pertinent labs and images were reviewed.      Past Medical History  Past Medical History:   Diagnosis Date   • Roque's esophagus    • Low iron        Past Surgical History  Past Surgical History:   Procedure Laterality Date   • CHOLECYSTECTOMY     • COLON SURGERY     • COLONOSCOPY     • GASTRIC BYPASS     • GASTROSTOMY     • HERNIA REPAIR     • TUBAL LIGATION          Allergies:  Patient has no known allergies.    Medications:  Current Outpatient Medications   Medication Sig Dispense Refill   • esomeprazole (NexIUM) 20 mg capsule Take 20 mg by mouth every morning before breakfast     • Multiple Vitamin tablet Take by mouth     • benzonatate (TESSALON) 200 MG capsule Take 1 capsule (200 mg total) by mouth 3 (three) times a day as needed for cough (Patient not taking: Reported on 2/14/2023) 20 capsule 0   • Cholecalciferol (D3 ADULT PO) Take by mouth (Patient not taking: Reported on 1/2/2024)     • iron polysaccharides (FERREX) 150 mg capsule Take 1 capsule (150 mg total) by mouth 2 (two) times a day for 15 days 30 capsule 0   • NIFEdipine 0.3%-lidocaine 5% rectal ointment Apply 1 application topically every 4 (four) hours as needed for discomfort or pain Apply a small amount to anal fissure (Patient not taking: Reported on 1/27/2023) 30 g 2     No current facility-administered medications for this visit.       Social History:  Social History     Socioeconomic History   • Marital status: /Civil Union     Spouse name: Kiko   • Number of children: 3   • Years of  education: None   • Highest education level: None   Occupational History   • Occupation:    Tobacco Use   • Smoking status: Never   • Smokeless tobacco: Never   Vaping Use   • Vaping status: Never Used   Substance and Sexual Activity   • Alcohol use: Not Currently   • Drug use: Not Currently   • Sexual activity: None   Other Topics Concern   • None   Social History Narrative   • None     Social Determinants of Health     Financial Resource Strain: Not on file   Food Insecurity: Not on file   Transportation Needs: Not on file   Physical Activity: Not on file   Stress: Not on file   Social Connections: Not on file   Intimate Partner Violence: Not on file   Housing Stability: Not on file          Review of Systems   Constitutional:  Negative for chills and fever.   Respiratory:  Negative for cough and shortness of breath.    Cardiovascular:  Negative for chest pain.   Gastrointestinal:  Negative for nausea and vomiting.   Musculoskeletal:  Negative for gait problem.   Skin:  Negative for wound.   Allergic/Immunologic: Negative for immunocompromised state.   Neurological:  Negative for weakness and numbness.   Hematological: Negative.    Psychiatric/Behavioral:  Negative for behavioral problems and confusion.          Objective:      /73   Pulse 75   Ht 5' (1.524 m)   Wt 61.7 kg (136 lb)   BMI 26.56 kg/m²          Physical Exam  Vitals reviewed.   Constitutional:       General: She is not in acute distress.     Appearance: She is not toxic-appearing or diaphoretic.   HENT:      Head: Normocephalic and atraumatic.   Eyes:      Extraocular Movements: Extraocular movements intact.   Cardiovascular:      Rate and Rhythm: Normal rate and regular rhythm.      Pulses: Normal pulses.           Dorsalis pedis pulses are 2+ on the right side and 2+ on the left side.        Posterior tibial pulses are 2+ on the right side and 2+ on the left side.   Pulmonary:      Effort: Pulmonary effort is normal. No  respiratory distress.   Musculoskeletal:         General: Tenderness present. No signs of injury.      Cervical back: Normal range of motion and neck supple.      Right lower leg: No edema.      Left lower leg: No edema.      Right foot: No foot drop.      Left foot: No foot drop.      Comments: Focal pain left 2nd MPJ dorsally and plantarly.  No significant swelling.  No dislocation of left 2nd MPJ.     Skin:     General: Skin is warm.      Capillary Refill: Capillary refill takes less than 2 seconds.      Coloration: Skin is not cyanotic or mottled.      Findings: No abscess.      Nails: There is no clubbing.      Comments: No significant incurvation at lateral nail border left great toe.   Neurological:      General: No focal deficit present.      Mental Status: She is alert and oriented to person, place, and time.      Cranial Nerves: No cranial nerve deficit.      Sensory: No sensory deficit.      Motor: No weakness.      Coordination: Coordination normal.   Psychiatric:         Mood and Affect: Mood normal.         Behavior: Behavior normal.         Thought Content: Thought content normal.         Judgment: Judgment normal.

## 2024-01-10 NOTE — PROGRESS NOTES
PATIENT:  Rajani Hillman  1965       ASSESSMENT:     1. Capsulitis of metatarsophalangeal (MTP) joint of left foot        2. Left foot pain  Ambulatory Referral to Podiatry    XR foot 3+ vw left      3. Ingrowing nail                  PLAN:  1. Reviewed medical records.  Patient was counseled and educated on the condition and the diagnosis.    2. X-ray was obtained and personally reviewed.  The radiological findings were discussed with the patient.  No acute osseous injury.    3. The exam and symptoms are consistent with left 2nd MPJ capsulitis.  The diagnosis, treatment options and prognosis were discussed with the patient.    4. Strapping applied and instructed daily application.  Instructed supportive care, home exercise, icing, and proper footwear/ arch support.     5. Rx: Meloxicam.  6. No significant ingrown nail in left great toe.  Instructed proper nail care.    7. Patient will return in 5 weeks for re-evaluation.       Imaging: I have personally reviewed pertinent films in PACS  Labs, pathology, and Other Studies: I have personally reviewed pertinent reports.        Subjective:       HPI  The patient was referred to my office for evaluation of pain in left foot.  She has it since October.  Burning sensation on the ball of left foot.  Pain increases with walking and standing.  The patient does not recall any injury, but reports some overuse.  She is on her feet all day at work.   The patient tried different shoes.  Denied any swelling. No significant weakness or dysfunction.  She also had some tenderness around toenail left great toe, but it had been better.        The following portions of the patient's history were reviewed and updated as appropriate: allergies, current medications, past family history, past medical history, past social history, past surgical history and problem list.  All pertinent labs and images were reviewed.      Past Medical History  Past Medical History:    Diagnosis Date   • Roque's esophagus    • Low iron        Past Surgical History  Past Surgical History:   Procedure Laterality Date   • CHOLECYSTECTOMY     • COLON SURGERY     • COLONOSCOPY     • GASTRIC BYPASS     • GASTROSTOMY     • HERNIA REPAIR     • TUBAL LIGATION          Allergies:  Patient has no known allergies.    Medications:  Current Outpatient Medications   Medication Sig Dispense Refill   • esomeprazole (NexIUM) 20 mg capsule Take 20 mg by mouth every morning before breakfast     • Multiple Vitamin tablet Take by mouth     • benzonatate (TESSALON) 200 MG capsule Take 1 capsule (200 mg total) by mouth 3 (three) times a day as needed for cough (Patient not taking: Reported on 2/14/2023) 20 capsule 0   • Cholecalciferol (D3 ADULT PO) Take by mouth (Patient not taking: Reported on 1/2/2024)     • iron polysaccharides (FERREX) 150 mg capsule Take 1 capsule (150 mg total) by mouth 2 (two) times a day for 15 days 30 capsule 0   • NIFEdipine 0.3%-lidocaine 5% rectal ointment Apply 1 application topically every 4 (four) hours as needed for discomfort or pain Apply a small amount to anal fissure (Patient not taking: Reported on 1/27/2023) 30 g 2     No current facility-administered medications for this visit.       Social History:  Social History     Socioeconomic History   • Marital status: /Civil Union     Spouse name: Kiko   • Number of children: 3   • Years of education: None   • Highest education level: None   Occupational History   • Occupation:    Tobacco Use   • Smoking status: Never   • Smokeless tobacco: Never   Vaping Use   • Vaping status: Never Used   Substance and Sexual Activity   • Alcohol use: Not Currently   • Drug use: Not Currently   • Sexual activity: None   Other Topics Concern   • None   Social History Narrative   • None     Social Determinants of Health     Financial Resource Strain: Not on file   Food Insecurity: Not on file   Transportation Needs: Not on file    Physical Activity: Not on file   Stress: Not on file   Social Connections: Not on file   Intimate Partner Violence: Not on file   Housing Stability: Not on file          Review of Systems   Constitutional:  Negative for chills and fever.   Respiratory:  Negative for cough and shortness of breath.    Cardiovascular:  Negative for chest pain.   Gastrointestinal:  Negative for nausea and vomiting.   Musculoskeletal:  Negative for gait problem.   Skin:  Negative for wound.   Allergic/Immunologic: Negative for immunocompromised state.   Neurological:  Negative for weakness and numbness.   Hematological: Negative.    Psychiatric/Behavioral:  Negative for behavioral problems and confusion.          Objective:      /73   Pulse 75   Ht 5' (1.524 m)   Wt 61.7 kg (136 lb)   BMI 26.56 kg/m²          Physical Exam  Vitals reviewed.   Constitutional:       General: She is not in acute distress.     Appearance: She is not toxic-appearing or diaphoretic.   HENT:      Head: Normocephalic and atraumatic.   Eyes:      Extraocular Movements: Extraocular movements intact.   Cardiovascular:      Rate and Rhythm: Normal rate and regular rhythm.      Pulses: Normal pulses.           Dorsalis pedis pulses are 2+ on the right side and 2+ on the left side.        Posterior tibial pulses are 2+ on the right side and 2+ on the left side.   Pulmonary:      Effort: Pulmonary effort is normal. No respiratory distress.   Musculoskeletal:         General: Tenderness present. No signs of injury.      Cervical back: Normal range of motion and neck supple.      Right lower leg: No edema.      Left lower leg: No edema.      Right foot: No foot drop.      Left foot: No foot drop.      Comments: Focal pain left 2nd MPJ dorsally and plantarly.  No significant swelling.  No dislocation of left 2nd MPJ.     Skin:     General: Skin is warm.      Capillary Refill: Capillary refill takes less than 2 seconds.      Coloration: Skin is not cyanotic or  mottled.      Findings: No abscess.      Nails: There is no clubbing.      Comments: No significant incurvation at lateral nail border left great toe.   Neurological:      General: No focal deficit present.      Mental Status: She is alert and oriented to person, place, and time.      Cranial Nerves: No cranial nerve deficit.      Sensory: No sensory deficit.      Motor: No weakness.      Coordination: Coordination normal.   Psychiatric:         Mood and Affect: Mood normal.         Behavior: Behavior normal.         Thought Content: Thought content normal.         Judgment: Judgment normal.

## 2024-02-21 ENCOUNTER — OFFICE VISIT (OUTPATIENT)
Dept: PODIATRY | Facility: CLINIC | Age: 59
End: 2024-02-21
Payer: COMMERCIAL

## 2024-02-21 VITALS
WEIGHT: 136 LBS | BODY MASS INDEX: 26.7 KG/M2 | HEIGHT: 60 IN | SYSTOLIC BLOOD PRESSURE: 110 MMHG | DIASTOLIC BLOOD PRESSURE: 73 MMHG

## 2024-02-21 DIAGNOSIS — M79.672 LEFT FOOT PAIN: ICD-10-CM

## 2024-02-21 DIAGNOSIS — M77.52 CAPSULITIS OF METATARSOPHALANGEAL (MTP) JOINT OF LEFT FOOT: Primary | ICD-10-CM

## 2024-02-21 PROCEDURE — 99213 OFFICE O/P EST LOW 20 MIN: CPT | Performed by: PODIATRIST

## 2024-02-21 RX ORDER — MELOXICAM 15 MG/1
15 TABLET ORAL DAILY
Qty: 30 TABLET | Refills: 0 | Status: SHIPPED | OUTPATIENT
Start: 2024-02-21

## 2024-02-21 NOTE — PROGRESS NOTES
PATIENT:  Rajani Avilessamuel  1965       ASSESSMENT:     1. Capsulitis of metatarsophalangeal (MTP) joint of left foot  meloxicam (MOBIC) 15 mg tablet      2. Left foot pain                    PLAN:  1. Reviewed medical records.  Patient was counseled and educated on the condition and the diagnosis.    2.  The diagnosis, treatment options and prognosis were discussed with the patient.    3. She is doing well.  Continue strapping.  Instructed to continue supportive care, home exercise, icing, and proper footwear/ arch support.     5. Meloxicam as needed.  Possible injection depending on the progress.    6. She may call the office for any increased symptoms.     Imaging: I have personally reviewed pertinent films in PACS  Labs, pathology, and Other Studies: I have personally reviewed pertinent reports.        Subjective:       HPI  The patient presents for follow-up on left foot pain.  She feels much better now.  Pain is 80% better.  No pain when she wears supportive sneakers.  Denied any swelling. No significant weakness or dysfunction. No new complaint.     The following portions of the patient's history were reviewed and updated as appropriate: allergies, current medications, past family history, past medical history, past social history, past surgical history and problem list.  All pertinent labs and images were reviewed.      Past Medical History  Past Medical History:   Diagnosis Date    Roque's esophagus     Low iron        Past Surgical History  Past Surgical History:   Procedure Laterality Date    CHOLECYSTECTOMY      COLON SURGERY      COLONOSCOPY      GASTRIC BYPASS      GASTROSTOMY      HERNIA REPAIR      TUBAL LIGATION          Allergies:  Patient has no known allergies.    Medications:  Current Outpatient Medications   Medication Sig Dispense Refill    esomeprazole (NexIUM) 20 mg capsule Take 20 mg by mouth every morning before breakfast      meloxicam (MOBIC) 15 mg tablet Take 1 tablet  (15 mg total) by mouth daily after meal 30 tablet 0    Multiple Vitamin tablet Take by mouth      benzonatate (TESSALON) 200 MG capsule Take 1 capsule (200 mg total) by mouth 3 (three) times a day as needed for cough (Patient not taking: Reported on 2/14/2023) 20 capsule 0    Cholecalciferol (D3 ADULT PO) Take by mouth (Patient not taking: Reported on 1/2/2024)      iron polysaccharides (FERREX) 150 mg capsule Take 1 capsule (150 mg total) by mouth 2 (two) times a day for 15 days 30 capsule 0    NIFEdipine 0.3%-lidocaine 5% rectal ointment Apply 1 application topically every 4 (four) hours as needed for discomfort or pain Apply a small amount to anal fissure (Patient not taking: Reported on 1/27/2023) 30 g 2     No current facility-administered medications for this visit.       Social History:  Social History     Socioeconomic History    Marital status: /Civil Union     Spouse name: Kiko    Number of children: 3    Years of education: None    Highest education level: None   Occupational History    Occupation:    Tobacco Use    Smoking status: Never    Smokeless tobacco: Never   Vaping Use    Vaping status: Never Used   Substance and Sexual Activity    Alcohol use: Not Currently    Drug use: Not Currently    Sexual activity: None   Other Topics Concern    None   Social History Narrative    None     Social Determinants of Health     Financial Resource Strain: Not on file   Food Insecurity: Not on file   Transportation Needs: Not on file   Physical Activity: Not on file   Stress: Not on file   Social Connections: Not on file   Intimate Partner Violence: Not on file   Housing Stability: Not on file          Review of Systems   Constitutional:  Negative for chills and fever.   Respiratory:  Negative for cough and shortness of breath.    Cardiovascular:  Negative for chest pain.   Gastrointestinal:  Negative for nausea and vomiting.   Allergic/Immunologic: Negative for immunocompromised state.    Neurological:  Negative for weakness and numbness.   Psychiatric/Behavioral:  Negative for confusion.          Objective:      /73   Ht 5' (1.524 m)   Wt 61.7 kg (136 lb)   BMI 26.56 kg/m²          Physical Exam  Vitals reviewed.   Constitutional:       General: She is not in acute distress.     Appearance: She is not toxic-appearing or diaphoretic.   Cardiovascular:      Rate and Rhythm: Normal rate and regular rhythm.      Pulses: Normal pulses.           Dorsalis pedis pulses are 2+ on the right side and 2+ on the left side.        Posterior tibial pulses are 2+ on the right side and 2+ on the left side.   Pulmonary:      Effort: Pulmonary effort is normal. No respiratory distress.   Musculoskeletal:         General: Tenderness present. No signs of injury.      Right lower leg: No edema.      Left lower leg: No edema.      Right foot: No foot drop.      Left foot: No foot drop.      Comments: Mild tenderness left 2nd MPJ plantarly.  No significant swelling.  No dislocation of left 2nd MPJ.     Skin:     General: Skin is warm.      Capillary Refill: Capillary refill takes less than 2 seconds.      Coloration: Skin is not cyanotic or mottled.      Findings: No abscess.      Nails: There is no clubbing.      Comments: No significant incurvation at lateral nail border left great toe.   Neurological:      General: No focal deficit present.      Mental Status: She is alert and oriented to person, place, and time.      Cranial Nerves: No cranial nerve deficit.      Sensory: No sensory deficit.      Motor: No weakness.      Coordination: Coordination normal.   Psychiatric:         Mood and Affect: Mood normal.         Behavior: Behavior normal.         Thought Content: Thought content normal.         Judgment: Judgment normal.

## 2024-07-02 ENCOUNTER — OFFICE VISIT (OUTPATIENT)
Dept: FAMILY MEDICINE CLINIC | Facility: CLINIC | Age: 59
End: 2024-07-02
Payer: COMMERCIAL

## 2024-07-02 VITALS
HEART RATE: 86 BPM | OXYGEN SATURATION: 97 % | BODY MASS INDEX: 25.97 KG/M2 | WEIGHT: 133 LBS | DIASTOLIC BLOOD PRESSURE: 74 MMHG | SYSTOLIC BLOOD PRESSURE: 100 MMHG

## 2024-07-02 DIAGNOSIS — R10.31 RIGHT LOWER QUADRANT ABDOMINAL PAIN: Primary | ICD-10-CM

## 2024-07-02 PROCEDURE — 99213 OFFICE O/P EST LOW 20 MIN: CPT | Performed by: NURSE PRACTITIONER

## 2024-07-02 NOTE — PROGRESS NOTES
Ambulatory Visit  Name: Rajani Hillman      : 1965      MRN: [id number]  Encounter Provider: HIRAM Chairez  Encounter Date: 2024   Encounter department: [facility]    Assessment & Plan   1. Right lower quadrant abdominal pain  Assessment & Plan:  Positive tenderness RLQ with palpation. Ultrasound ordered to rule out appendicitis.  Instructed to go to ED if pain worsens.  Orders:  -     US appendix; Future; Expected date: 2024       History of Present Illness     C/o Right lower abdominal pain started  after eating a steak dinner. Past history of gastric bypass and Roque's esophagus. Pain is a dull cramping, worse at night. No nausea or vomiting, no fever. Took Tylenol which gave her mild relief.    GI Problem  The primary symptoms include fatigue and abdominal pain. Primary symptoms do not include fever, nausea, vomiting, diarrhea, melena, hematemesis, jaundice, hematochezia or dysuria. The illness began 2 days ago. The onset was gradual. The problem has not changed since onset.  The illness is also significant for bloating. The illness does not include chills, dysphagia or constipation. Significant associated medical issues include gastric bypass. Associated medical issues do not include irritable bowel syndrome, hemorrhoids or diverticulitis.       Review of Systems   Constitutional:  Positive for fatigue. Negative for chills and fever.   Respiratory:  Negative for chest tightness, shortness of breath and wheezing.    Cardiovascular:  Negative for chest pain and palpitations.   Gastrointestinal:  Positive for abdominal pain and bloating. Negative for constipation, diarrhea, dysphagia, hematemesis, hematochezia, jaundice, melena, nausea and vomiting.   Genitourinary:  Negative for difficulty urinating and dysuria.       Objective     /74 (BP Location: Left arm, Patient Position: Sitting, Cuff Size: Standard)   Pulse 86   Wt 60.3 kg (133  lb)   SpO2 97%   BMI 25.97 kg/m²     Physical Exam  Vitals and nursing note reviewed.   Constitutional:       Appearance: Normal appearance.   HENT:      Head: Normocephalic.   Eyes:      Extraocular Movements: Extraocular movements intact.   Cardiovascular:      Rate and Rhythm: Normal rate and regular rhythm.      Heart sounds: Normal heart sounds.   Pulmonary:      Effort: Pulmonary effort is normal. No respiratory distress.      Breath sounds: Normal breath sounds.   Chest:      Chest wall: No tenderness.   Abdominal:      General: Bowel sounds are normal. There is no distension.      Palpations: Abdomen is soft.      Tenderness: There is abdominal tenderness in the right lower quadrant. There is rebound.      Hernia: No hernia is present.   Neurological:      Mental Status: She is alert.   Psychiatric:         Mood and Affect: Mood normal.         Behavior: Behavior normal.

## 2024-07-02 NOTE — ASSESSMENT & PLAN NOTE
Positive tenderness RLQ with palpation. Ultrasound ordered to rule out appendicitis.  Instructed to go to ED if pain worsens.

## 2024-07-03 ENCOUNTER — APPOINTMENT (EMERGENCY)
Dept: CT IMAGING | Facility: HOSPITAL | Age: 59
DRG: 392 | End: 2024-07-03
Payer: COMMERCIAL

## 2024-07-03 ENCOUNTER — HOSPITAL ENCOUNTER (INPATIENT)
Facility: HOSPITAL | Age: 59
LOS: 1 days | Discharge: HOME | DRG: 392 | End: 2024-07-04
Attending: EMERGENCY MEDICINE | Admitting: INTERNAL MEDICINE
Payer: COMMERCIAL

## 2024-07-03 DIAGNOSIS — Q79.9 BONY ABNORMALITY: ICD-10-CM

## 2024-07-03 DIAGNOSIS — R18.8 ASCITES: ICD-10-CM

## 2024-07-03 DIAGNOSIS — K52.9 COLITIS: Primary | ICD-10-CM

## 2024-07-03 LAB
ALBUMIN SERPL BCG-MCNC: 4.1 G/DL (ref 3.5–5)
ALP SERPL-CCNC: 101 U/L (ref 34–104)
ALT SERPL W P-5'-P-CCNC: 18 U/L (ref 7–52)
ANION GAP SERPL CALCULATED.3IONS-SCNC: 6 MMOL/L (ref 4–13)
APTT PPP: 29 SECONDS (ref 23–37)
AST SERPL W P-5'-P-CCNC: 20 U/L (ref 13–39)
BACTERIA UR QL AUTO: ABNORMAL /HPF
BASOPHILS # BLD AUTO: 0.06 THOUSANDS/ÂΜL (ref 0–0.1)
BASOPHILS NFR BLD AUTO: 0 % (ref 0–1)
BILIRUB SERPL-MCNC: 0.7 MG/DL (ref 0.2–1)
BILIRUB UR QL STRIP: NEGATIVE
BUN SERPL-MCNC: 18 MG/DL (ref 5–25)
CALCIUM SERPL-MCNC: 9.3 MG/DL (ref 8.4–10.2)
CHLORIDE SERPL-SCNC: 104 MMOL/L (ref 96–108)
CLARITY UR: CLEAR
CO2 SERPL-SCNC: 28 MMOL/L (ref 21–32)
COLOR UR: ABNORMAL
CREAT SERPL-MCNC: 0.75 MG/DL (ref 0.6–1.3)
CRP SERPL QL: 55 MG/L
EOSINOPHIL # BLD AUTO: 0.2 THOUSAND/ÂΜL (ref 0–0.61)
EOSINOPHIL NFR BLD AUTO: 1 % (ref 0–6)
ERYTHROCYTE [DISTWIDTH] IN BLOOD BY AUTOMATED COUNT: 12.7 % (ref 11.6–15.1)
GFR SERPL CREATININE-BSD FRML MDRD: 86 ML/MIN/1.73SQ M
GLUCOSE SERPL-MCNC: 93 MG/DL (ref 65–140)
GLUCOSE UR STRIP-MCNC: ABNORMAL MG/DL
HCT VFR BLD AUTO: 41.3 % (ref 34.8–46.1)
HGB BLD-MCNC: 12.9 G/DL (ref 11.5–15.4)
HGB UR QL STRIP.AUTO: NEGATIVE
IMM GRANULOCYTES # BLD AUTO: 0.11 THOUSAND/UL (ref 0–0.2)
IMM GRANULOCYTES NFR BLD AUTO: 1 % (ref 0–2)
INR PPP: 1.13 (ref 0.84–1.19)
KETONES UR STRIP-MCNC: ABNORMAL MG/DL
LACTATE SERPL-SCNC: 1.8 MMOL/L (ref 0.5–2)
LEUKOCYTE ESTERASE UR QL STRIP: NEGATIVE
LIPASE SERPL-CCNC: 40 U/L (ref 11–82)
LYMPHOCYTES # BLD AUTO: 1.96 THOUSANDS/ÂΜL (ref 0.6–4.47)
LYMPHOCYTES NFR BLD AUTO: 12 % (ref 14–44)
MCH RBC QN AUTO: 28.4 PG (ref 26.8–34.3)
MCHC RBC AUTO-ENTMCNC: 31.2 G/DL (ref 31.4–37.4)
MCV RBC AUTO: 91 FL (ref 82–98)
MONOCYTES # BLD AUTO: 1.31 THOUSAND/ÂΜL (ref 0.17–1.22)
MONOCYTES NFR BLD AUTO: 8 % (ref 4–12)
NEUTROPHILS # BLD AUTO: 12.23 THOUSANDS/ÂΜL (ref 1.85–7.62)
NEUTS SEG NFR BLD AUTO: 78 % (ref 43–75)
NITRITE UR QL STRIP: POSITIVE
NON-SQ EPI CELLS URNS QL MICRO: ABNORMAL /HPF
NRBC BLD AUTO-RTO: 0 /100 WBCS
PH UR STRIP.AUTO: 5.5 [PH]
PLATELET # BLD AUTO: 288 THOUSANDS/UL (ref 149–390)
PMV BLD AUTO: 10.4 FL (ref 8.9–12.7)
POTASSIUM SERPL-SCNC: 4 MMOL/L (ref 3.5–5.3)
PROCALCITONIN SERPL-MCNC: <0.05 NG/ML
PROT SERPL-MCNC: 6.9 G/DL (ref 6.4–8.4)
PROT UR STRIP-MCNC: NEGATIVE MG/DL
PROTHROMBIN TIME: 15 SECONDS (ref 11.6–14.5)
RBC # BLD AUTO: 4.54 MILLION/UL (ref 3.81–5.12)
RBC #/AREA URNS AUTO: ABNORMAL /HPF
SODIUM SERPL-SCNC: 138 MMOL/L (ref 135–147)
SP GR UR STRIP.AUTO: <1.005 (ref 1–1.03)
UROBILINOGEN UR STRIP-ACNC: <2 MG/DL
WBC # BLD AUTO: 15.87 THOUSAND/UL (ref 4.31–10.16)
WBC #/AREA URNS AUTO: ABNORMAL /HPF

## 2024-07-03 PROCEDURE — 36415 COLL VENOUS BLD VENIPUNCTURE: CPT | Performed by: EMERGENCY MEDICINE

## 2024-07-03 PROCEDURE — 83690 ASSAY OF LIPASE: CPT | Performed by: EMERGENCY MEDICINE

## 2024-07-03 PROCEDURE — 85610 PROTHROMBIN TIME: CPT | Performed by: EMERGENCY MEDICINE

## 2024-07-03 PROCEDURE — 86140 C-REACTIVE PROTEIN: CPT | Performed by: EMERGENCY MEDICINE

## 2024-07-03 PROCEDURE — 84145 PROCALCITONIN (PCT): CPT | Performed by: EMERGENCY MEDICINE

## 2024-07-03 PROCEDURE — 87040 BLOOD CULTURE FOR BACTERIA: CPT | Performed by: EMERGENCY MEDICINE

## 2024-07-03 PROCEDURE — 87505 NFCT AGENT DETECTION GI: CPT | Performed by: HOSPITALIST

## 2024-07-03 PROCEDURE — 99222 1ST HOSP IP/OBS MODERATE 55: CPT | Performed by: INTERNAL MEDICINE

## 2024-07-03 PROCEDURE — 83993 ASSAY FOR CALPROTECTIN FECAL: CPT | Performed by: HOSPITALIST

## 2024-07-03 PROCEDURE — 83605 ASSAY OF LACTIC ACID: CPT | Performed by: EMERGENCY MEDICINE

## 2024-07-03 PROCEDURE — 81001 URINALYSIS AUTO W/SCOPE: CPT | Performed by: EMERGENCY MEDICINE

## 2024-07-03 PROCEDURE — 74177 CT ABD & PELVIS W/CONTRAST: CPT

## 2024-07-03 PROCEDURE — 85730 THROMBOPLASTIN TIME PARTIAL: CPT | Performed by: EMERGENCY MEDICINE

## 2024-07-03 PROCEDURE — 99285 EMERGENCY DEPT VISIT HI MDM: CPT | Performed by: EMERGENCY MEDICINE

## 2024-07-03 PROCEDURE — 85025 COMPLETE CBC W/AUTO DIFF WBC: CPT | Performed by: EMERGENCY MEDICINE

## 2024-07-03 PROCEDURE — 80053 COMPREHEN METABOLIC PANEL: CPT | Performed by: EMERGENCY MEDICINE

## 2024-07-03 PROCEDURE — 99222 1ST HOSP IP/OBS MODERATE 55: CPT | Performed by: HOSPITALIST

## 2024-07-03 RX ORDER — SODIUM CHLORIDE 9 MG/ML
100 INJECTION, SOLUTION INTRAVENOUS CONTINUOUS
Status: DISCONTINUED | OUTPATIENT
Start: 2024-07-03 | End: 2024-07-04 | Stop reason: HOSPADM

## 2024-07-03 RX ORDER — KETOROLAC TROMETHAMINE 30 MG/ML
15 INJECTION, SOLUTION INTRAMUSCULAR; INTRAVENOUS EVERY 6 HOURS PRN
Status: DISCONTINUED | OUTPATIENT
Start: 2024-07-03 | End: 2024-07-04 | Stop reason: HOSPADM

## 2024-07-03 RX ORDER — ENOXAPARIN SODIUM 100 MG/ML
40 INJECTION SUBCUTANEOUS DAILY
Status: DISCONTINUED | OUTPATIENT
Start: 2024-07-03 | End: 2024-07-04 | Stop reason: HOSPADM

## 2024-07-03 RX ORDER — MULTIVITAMIN WITH IRON
TABLET ORAL DAILY
COMMUNITY

## 2024-07-03 RX ORDER — PANTOPRAZOLE SODIUM 20 MG/1
20 TABLET, DELAYED RELEASE ORAL
Status: DISCONTINUED | OUTPATIENT
Start: 2024-07-03 | End: 2024-07-04 | Stop reason: HOSPADM

## 2024-07-03 RX ADMIN — PIPERACILLIN SODIUM AND TAZOBACTAM SODIUM 4.5 G: 4; .5 INJECTION, POWDER, LYOPHILIZED, FOR SOLUTION INTRAVENOUS at 11:33

## 2024-07-03 RX ADMIN — SODIUM CHLORIDE 1000 ML: 0.9 INJECTION, SOLUTION INTRAVENOUS at 07:55

## 2024-07-03 RX ADMIN — IOHEXOL 100 ML: 350 INJECTION, SOLUTION INTRAVENOUS at 09:44

## 2024-07-03 RX ADMIN — SODIUM CHLORIDE 100 ML/HR: 0.9 INJECTION, SOLUTION INTRAVENOUS at 13:42

## 2024-07-03 RX ADMIN — KETOROLAC TROMETHAMINE 15 MG: 30 INJECTION, SOLUTION INTRAMUSCULAR; INTRAVENOUS at 15:28

## 2024-07-03 RX ADMIN — KETOROLAC TROMETHAMINE 15 MG: 30 INJECTION, SOLUTION INTRAMUSCULAR; INTRAVENOUS at 21:15

## 2024-07-03 RX ADMIN — ENOXAPARIN SODIUM 40 MG: 40 INJECTION SUBCUTANEOUS at 13:04

## 2024-07-03 RX ADMIN — B-COMPLEX W/ C & FOLIC ACID TAB 1 TABLET: TAB at 13:04

## 2024-07-03 RX ADMIN — PANTOPRAZOLE SODIUM 20 MG: 20 TABLET, DELAYED RELEASE ORAL at 13:04

## 2024-07-03 RX ADMIN — PIPERACILLIN SODIUM AND TAZOBACTAM SODIUM 4.5 G: 4; .5 INJECTION, POWDER, LYOPHILIZED, FOR SOLUTION INTRAVENOUS at 15:14

## 2024-07-03 RX ADMIN — IOHEXOL 50 ML: 240 INJECTION, SOLUTION INTRATHECAL; INTRAVASCULAR; INTRAVENOUS; ORAL at 09:44

## 2024-07-03 RX ADMIN — PIPERACILLIN SODIUM AND TAZOBACTAM SODIUM 4.5 G: 4; .5 INJECTION, POWDER, LYOPHILIZED, FOR SOLUTION INTRAVENOUS at 23:19

## 2024-07-03 NOTE — H&P
[facility]  History and Physical Note  Name: Rajani Hillman I  MRN: [id number]  Unit/Bed#: -01 I Date of Admission: 7/3/2024   Date of Service: 7/3/2024 I Hospital Day: 0        Assessment & Plan  * Colitis  Assessment & Plan  CT abd/pelvis: 1.  Short segment thickening of the descending colon with adjacent mesenteric edema/fluid. While there appears to be a diverticula in this region, it is not definitely inflamed and other etiologies of infectious or inflammatory colitis is not excluded.   Colonic neoplasm is less likely though follow-up colonoscopy is recommended following acute illness.   2.  Mild ascites which is likely related to the inflammatory process though perihepatic and perisplenic ascites is not typical for focal colonic inflammation/diverticulitis.   3.  Status post gastric bypass with moderate hiatal hernia and gastroesophageal reflux.   4.  Moderate to severe bladder distention.   5.  Mottled appearance of the bones with scattered lucencies, not present previously. This may be metabolic though multiple myeloma is not excluded. Clinical correlation is recommended.      Clear liquid diet for now  GI consult  IV Zosyn  Stool enteric panel  Fecal calprotectin  No metabolic abnormalities such as hypercalcemia or renal impairment to suggest multiple myeloma.                         Chief Complaint   Patient presents with    Abdominal Pain       Started Sunday; no N/V/D         HPI:  Rajani Hillman is a 59 y.o. female who presents with GI symptoms.  The patient states she went to [facility] with a [employer] friend and after consuming the meal there, developed abdominal pain primarily lower in nature but has since moved slightly upward.  5 out of 10 intensity.  Denies bloody diarrhea.  Denies fevers or chills.  Denies emesis.  Denies sick contacts.  Denies chest pain.  Denies neurological complaints.  Denies dyspnea.        Historical Information  Medical History         Past Medical History:   Diagnosis Date    Roque's esophagus      Low iron           Surgical History         Past Surgical History:   Procedure Laterality Date    CHOLECYSTECTOMY        COLON SURGERY        COLONOSCOPY        GASTRIC BYPASS        GASTROSTOMY        HERNIA REPAIR        TUBAL LIGATION                   Social History  Social History          Substance and Sexual Activity   Alcohol Use Not Currently      Social History          Substance and Sexual Activity   Drug Use Not Currently      Tobacco Use History   Social History          Tobacco Use   Smoking Status Never   Smokeless Tobacco Never         Family History         Family History   Problem Relation Age of Onset    Heart disease Mother      Diabetes Sister      Thyroid cancer Son      Heart disease Maternal Grandmother      Diabetes Maternal Grandmother      Stroke Maternal Grandmother      ALS Maternal Grandfather      ALS Maternal Aunt      ALS Cousin      Thyroid disease Sister      Colon cancer Neg Hx      Colon polyps Neg Hx                    Meds/Allergies  Allergies   No Known Allergies        Meds:    Current Medications      Current Facility-Administered Medications:     enoxaparin (LOVENOX) subcutaneous injection 40 mg, 40 mg, Subcutaneous, Daily, Lion Suarez MD    ketorolac (TORADOL) injection 15 mg, 15 mg, Intravenous, Q6H PRN, Lion Suarez MD    multivitamin stress formula tablet 1 tablet, 1 tablet, Oral, Daily, Lion Suarez MD    pantoprazole (PROTONIX) EC tablet 20 mg, 20 mg, Oral, Daily Before Breakfast, Lion Suarez MD    piperacillin-tazobactam (ZOSYN) IVPB (EXTENDED INFUSION) 4.5 g, 4.5 g, Intravenous, Q8H, Lion Suarez MD    sodium chloride 0.9 % infusion, 100 mL/hr, Intravenous, Continuous, Lion Suarez MD          Prescriptions Prior to Admission      Medications Prior to Admission:     esomeprazole (NexIUM) 20 mg capsule    iron polysaccharides (FERREX) 150 mg  capsule    Multiple Vitamin tablet           Review of Systems:     A complete and comprehensive 14 point organ system review was performed and all other systems are negative other than stated above in the HPI     Current Vitals:   Blood Pressure: 120/82 (07/03/24 1239)  Pulse: 81 (07/03/24 1239)  Temperature: 97.7 °F (36.5 °C) (07/03/24 1239)  Temp Source: Oral (07/03/24 0746)  Respirations: 20 (07/03/24 1153)  Height: 5' (152.4 cm) (07/03/24 1153)  Weight - Scale: 62.6 kg (138 lb) (07/03/24 1153)  SpO2: 95 % (07/03/24 1239)  SPO2 RA Rest       Flowsheet Row ED to Hosp-Admission (Current) from 7/3/2024 in [facility]   SpO2 95 %   SpO2 Activity At Rest   O2 Device None (Room air)   O2 Flow Rate --                Intake/Output Summary (Last 24 hours) at 7/3/2024 1242  Last data filed at 7/3/2024 0904      Gross per 24 hour   Intake 1000 ml   Output --   Net 1000 ml      Body mass index is 26.95 kg/m².      Physical Exam:        General: well appearing, no acute distress  HEENT: atraumatic, PERRLA, moist mucosa, normal pharynx, normal tonsils and adenoids, normal tongue, no fluid in sinuses  Neck: Trachea midline, no carotid bruit, no masses  Respiratory: normal chest wall expansion, CTA B, no r/r/w, no rubs  Cardiovascular: RRR, no m/r/g, Normal S1 and S2  Abdomen: Soft, mild-tender, non-distended, normal bowel sounds in all quadrants, no hepatosplenomegaly, no tympany  Rectal: deferred  Musculoskeletal: normal ROM in upper and lower extremities  Integumentary: warm, dry, and pink, with no rash, purpura, or petechia  Heme/Lymph: no lymphadenopathy, no bruises  Neurological: Cranial Nerves II-XII grossly intact; no focal deficits in sensation or strength  Psychiatric: cooperative with normal mood and affect     Lab Results:   CBC:         Lab Results   Component Value Date     WBC 15.87 (H) 07/03/2024     HGB 12.9 07/03/2024     HCT 41.3 07/03/2024     MCV 91 07/03/2024       "07/03/2024     RBC 4.54 07/03/2024     MCH 28.4 07/03/2024     MCHC 31.2 (L) 07/03/2024     RDW 12.7 07/03/2024     MPV 10.4 07/03/2024     NRBC 0 07/03/2024      CMP:        Lab Results   Component Value Date      07/03/2024      10/19/2022     CO2 28 07/03/2024     CO2 28 10/19/2022     BUN 18 07/03/2024     BUN 19 10/19/2022     CREATININE 0.75 07/03/2024     CALCIUM 9.3 07/03/2024     CALCIUM 9.2 10/19/2022     AST 20 07/03/2024     AST 29 10/19/2022     ALT 18 07/03/2024     ALT 21 10/19/2022     ALKPHOS 101 07/03/2024     ALKPHOS 108 10/19/2022     EGFR 86 07/03/2024      No results found for: \"TROPONINI\", \"CKMB\", \"CKTOTAL\"  Coagulation:         Lab Results   Component Value Date     INR 1.13 07/03/2024    Urinalysis:        Lab Results   Component Value Date     COLORU Light Yellow 07/03/2024     CLARITYU Clear 07/03/2024     SPECGRAV <1.005 (L) 07/03/2024     PHUR 5.5 07/03/2024     LEUKOCYTESUR Negative 07/03/2024     NITRITE Positive (A) 07/03/2024     GLUCOSEU 30 (3/100%) (A) 07/03/2024     KETONESU Trace (A) 07/03/2024     BILIRUBINUR Negative 07/03/2024     BLOODU Negative 07/03/2024      Amylase: No results found for: \"AMYLASE\"  Lipase:         Lab Results   Component Value Date     LIPASE 40 07/03/2024         Imaging: CT abdomen pelvis with contrast     Result Date: 7/3/2024  Narrative: CT ABDOMEN AND PELVIS WITH IV CONTRAST INDICATION: Minimal pain history of gastric bypass. COMPARISON: 3/27/2012. TECHNIQUE: CT examination of the abdomen and pelvis was performed. Multiplanar 2D reformatted images were created from the source data. This examination, like all CT scans performed in the [facility], was performed utilizing techniques to minimize radiation dose exposure, including the use of iterative reconstruction and automated exposure control. Radiation dose length product (DLP) for this visit: 441 mGy-cm IV Contrast: 100 mL of iohexol (OMNIPAQUE) Enteric Contrast: " Administered. FINDINGS: ABDOMEN LOWER CHEST: Moderate hiatal hernia with gastroesophageal reflux. There is trace bilateral pleural fluid, particularly on the right. LIVER/BILIARY TREE: Mild biliary ductal dilatation, likely on the basis of cholecystectomy. GALLBLADDER: Post cholecystectomy. SPLEEN: Unremarkable. PANCREAS: Unremarkable. ADRENAL GLANDS: Unremarkable. KIDNEYS/URETERS: There is a left renal cyst. No hydronephrosis. STOMACH AND BOWEL: Status post gastric bypass without complication. There is thickening of a short segment of the descending colon with adjacent mesenteric fluid/edema. There may be a diverticula on series 601 image 49 though not definitely inflamed. Small bowel is unremarkable. APPENDIX: No findings to suggest appendicitis. ABDOMINOPELVIC CAVITY: There is mild perihepatic, perisplenic, pelvic and bilateral paracolic gutter fluid. No pneumoperitoneum. No lymphadenopathy. VESSELS: Unremarkable for patient's age. PELVIS REPRODUCTIVE ORGANS: There is a 2.6 cm left adnexal cyst. Cysts less than 3 cm do not require routine follow-up. Probable fibroid uterus. URINARY BLADDER: There is moderate to severe bladder distention. ABDOMINAL WALL/INGUINAL REGIONS: Unremarkable. BONES: There are small lucencies throughout the bones which were not present previously.      Impression: 1.  Short segment thickening of the descending colon with adjacent mesenteric edema/fluid. While there appears to be a diverticula in this region, it is not definitely inflamed and other etiologies of infectious or inflammatory colitis is not excluded. Colonic neoplasm is less likely though follow-up colonoscopy is recommended following acute illness. 2.  Mild ascites which is likely related to the inflammatory process though perihepatic and perisplenic ascites is not typical for focal colonic inflammation/diverticulitis. 3.  Status post gastric bypass with moderate hiatal hernia and gastroesophageal reflux. 4.  Moderate to  "severe bladder distention. 5.  Mottled appearance of the bones with scattered lucencies, not present previously. This may be metabolic though multiple myeloma is not excluded. Clinical correlation is recommended. The study was marked in EPIC for immediate notification. Workstation performed: [id number]      EKG, Pathology, and Other Studies: I have personally reviewed the results.  VTE   Prophylaxis: In place     Code Status: Level 1 - Full Code     Anticipated Length of Stay:  Patient will be admitted on an Inpatient basis with an anticipated length of stay of  greater 2 midnights.         \"This note has been constructed using a voice recognition system\"       Lion Suarez MD  7/3/2024, 12:42 PM                                "

## 2024-07-03 NOTE — CONSULTS
Consultation - GI   Rajani Hillman 59 y.o. female MRN: [id number]  Unit/Bed#: -01 Encounter: [id number]      Assessment & Plan   59-year-old female with history of GERD, Roque's esophagus, Denisse-en-Y gastric bypass 2005, small bowel obstruction with subsequent small bowel resection(3 ft removed) 2006, chronic anemia.  Presented to ED with 3 to 4-day history of lower abdominal pain which began after eating at a restaurant.  Associated nausea, no emesis, no diarrhea, no fevers  CT scan in the ED showed short segment wall thickening at descending colon with adjacent inflammation.  Diverticuli noted in the area but no inflammation noted in that area  WBC elevated, CRP elevated  Remote colonoscopy 2019 in [place] which patient reports was normal    1.  Abdominal pain  2.  Abnormal CT scan  CT scan in ED consistent with colitis which may be infectious etiology/foodborne illness vs SCAD  Denies diarrhea, no hematochezia or rectal bleeding  -Check stool cultures to rule out acute infection  -Continue IV Zosyn  -Okay for clear liquids  -Plan for outpatient GI follow-up with colonoscopy in 6 to 8 weeks to further evaluate colon    3.  Roque's esophagus  EGD 11/2021 showed irregular Z-line, biopsies positive for Roque's  3-year recall recommended/November 2024  GERD symptoms currently well-controlled with OTC Nexium 20 mg daily    4.  Denisse-en-Y  5.  S/p small bowel resection due to ischemic bowel 2006    Inpatient consult to gastroenterology  Consult performed by: HIRAM Pimentel  Consult ordered by: Lion Suarez MD          Physician Requesting Consult: Lion Suarez MD  Reason for Consult / Principal Problem: Abnormal CT scan    HPI: Rajani Hillman is a 59 y.o. year old female with PMH including GERD, Roque's esophagus on EGD 11/2021, Denisse-en-Y gastric bypass 2005, chronic anemia, currently receiving IV iron infusions.    Surgical history is significant for exploratory lap and lysis of  adhesions 4/2006, small bowel resection 10/2006 due to internal hernia with ischemia and repair of incisional hernia 3/2007  Colonoscopy performed approximately 2019-patient reports was normal with recall in 7 years/2026    Presented to ED this a.m. with 3 to 4-day history of lower abdominal pain which began after eating steak at [employer] on Sunday 6/30.    Patient reports intermittent sharp stabbing lower abdominal pain radiating to periumbilical area.  Associated mild nausea but no vomiting.  She has been able to eat small amounts but reports significant decrease in appetite.  No vomiting  Denies diarrhea or rectal bleeding.  Had hard brown stool this a.m., passing flatus  GERD symptoms currently well-controlled with OTC esomeprazole    In ED, CT scan of the abdomen shows short segment wall thickening at descending colon with adjacent mesenteric edema.  Diverticuli noted in that area with no clear inflammation, mild severity  Labs reviewed-WBCs elevated 15.8, hemoglobin stable at 12.9, normal LFTs, normal lipase.  CRP elevated at 55      Review of Systems   Constitutional:  Positive for appetite change and fatigue.   HENT: Negative.     Respiratory: Negative.     Cardiovascular: Negative.    Gastrointestinal:  Positive for abdominal pain, constipation and nausea.   Genitourinary: Negative.    Musculoskeletal: Negative.    Skin: Negative.    Neurological: Negative.    Hematological: Negative.    Psychiatric/Behavioral: Negative.           Historical Information   Past Medical History:   Diagnosis Date    Roque's esophagus     Low iron      Past Surgical History:   Procedure Laterality Date    CHOLECYSTECTOMY      COLON SURGERY      COLONOSCOPY      GASTRIC BYPASS      GASTROSTOMY      HERNIA REPAIR      TUBAL LIGATION       Social History   Social History     Substance and Sexual Activity   Alcohol Use Not Currently     Social History     Substance and Sexual Activity   Drug Use Not Currently     Social  History     Tobacco Use   Smoking Status Never   Smokeless Tobacco Never     Family History   Problem Relation Age of Onset    Heart disease Mother     Diabetes Sister     Thyroid cancer Son     Heart disease Maternal Grandmother     Diabetes Maternal Grandmother     Stroke Maternal Grandmother     ALS Maternal Grandfather     ALS Maternal Aunt     ALS Cousin     Thyroid disease Sister     Colon cancer Neg Hx     Colon polyps Neg Hx        Meds/Allergies   Current Facility-Administered Medications   Medication Dose Route Frequency    enoxaparin (LOVENOX) subcutaneous injection 40 mg  40 mg Subcutaneous Daily    ketorolac (TORADOL) injection 15 mg  15 mg Intravenous Q6H PRN    multivitamin stress formula tablet 1 tablet  1 tablet Oral Daily    pantoprazole (PROTONIX) EC tablet 20 mg  20 mg Oral Daily Before Breakfast    piperacillin-tazobactam (ZOSYN) IVPB (EXTENDED INFUSION) 4.5 g  4.5 g Intravenous Q8H    sodium chloride 0.9 % infusion  100 mL/hr Intravenous Continuous       Medications Prior to Admission:     esomeprazole (NexIUM) 20 mg capsule    Multiple Vitamin tablet    vitamin B-12 (CYANOCOBALAMIN) 50 MCG tablet    iron polysaccharides (FERREX) 150 mg capsule    No Known Allergies        Physical Exam   Constitutional: Appears comfortable, NAD  HENT: WNL  Head: Normocephalic and atraumatic.   Eyes: Anicteric  Neck: Normal range of motion. Neck supple.   Cardiovascular: Normal rate and regular rhythm.  Regular rate and rhythm, S1 and S2 normal  Pulmonary/Chest: Effort normal and breath sounds normal.   Abdominal: Soft, nondistended but tender with palpation at periumbilical area and right and left lower quadrants. Bowel sounds are hypoactive  Musculoskeletal: Normal range of motion.   Extremities:  No edema  Neurological: alert and oriented to person, place, and time.   Skin: Skin is warm and dry.   Psychiatric: normal mood and affect.       Lab Results:   Admission on 07/03/2024   Component Date Value     Color, UA 07/03/2024 Light Yellow     Clarity, UA 07/03/2024 Clear     Specific Gravity, UA 07/03/2024 <1.005 (L)     pH, UA 07/03/2024 5.5     Leukocytes, UA 07/03/2024 Negative     Nitrite, UA 07/03/2024 Positive (A)     Protein, UA 07/03/2024 Negative     Glucose, UA 07/03/2024 30 (3/100%) (A)     Ketones, UA 07/03/2024 Trace (A)     Urobilinogen, UA 07/03/2024 <2.0     Bilirubin, UA 07/03/2024 Negative     Occult Blood, UA 07/03/2024 Negative     WBC 07/03/2024 15.87 (H)     RBC 07/03/2024 4.54     Hemoglobin 07/03/2024 12.9     Hematocrit 07/03/2024 41.3     MCV 07/03/2024 91     MCH 07/03/2024 28.4     MCHC 07/03/2024 31.2 (L)     RDW 07/03/2024 12.7     MPV 07/03/2024 10.4     Platelets 07/03/2024 288     nRBC 07/03/2024 0     Segmented % 07/03/2024 78 (H)     Immature Grans % 07/03/2024 1     Lymphocytes % 07/03/2024 12 (L)     Monocytes % 07/03/2024 8     Eosinophils Relative 07/03/2024 1     Basophils Relative 07/03/2024 0     Absolute Neutrophils 07/03/2024 12.23 (H)     Absolute Immature Grans 07/03/2024 0.11     Absolute Lymphocytes 07/03/2024 1.96     Absolute Monocytes 07/03/2024 1.31 (H)     Eosinophils Absolute 07/03/2024 0.20     Basophils Absolute 07/03/2024 0.06     Protime 07/03/2024 15.0 (H)     INR 07/03/2024 1.13     PTT 07/03/2024 29     Sodium 07/03/2024 138     Potassium 07/03/2024 4.0     Chloride 07/03/2024 104     CO2 07/03/2024 28     ANION GAP 07/03/2024 6     BUN 07/03/2024 18     Creatinine 07/03/2024 0.75     Glucose 07/03/2024 93     Calcium 07/03/2024 9.3     AST 07/03/2024 20     ALT 07/03/2024 18     Alkaline Phosphatase 07/03/2024 101     Total Protein 07/03/2024 6.9     Albumin 07/03/2024 4.1     Total Bilirubin 07/03/2024 0.70     eGFR 07/03/2024 86     Lipase 07/03/2024 40     RBC, UA 07/03/2024 None Seen     WBC, UA 07/03/2024 None Seen     Epithelial Cells 07/03/2024 Occasional     Bacteria, UA 07/03/2024 Moderate (A)     LACTIC ACID 07/03/2024 1.8     CRP 07/03/2024 55.0  (H)     Procalcitonin 07/03/2024 <0.05      Imaging Studies: I have personally reviewed pertinent reports.      EKG, Pathology, and Other Studies: I have personally reviewed pertinent reports.    Counseling / Coordination of Care  Total floor / unit time spent today 45 minutes.

## 2024-07-03 NOTE — ED PROVIDER NOTES
History  Chief Complaint   Patient presents with    Abdominal Pain     Started Sunday; no N/V/D     This is a 59-year-old female with a history of numerous prior abdominal surgeries including gastric bypass and cholecystectomy and bowel obstruction.  Presents with generalized lower abdominal pain but worse on the right side over the past 4 days.  Denies any urinary symptoms no nausea vomiting diarrhea.      History provided by:  Patient  Medical Problem  Location:  Lower abdomen  Quality:  Sharp pain  Severity:  Moderate  Onset quality:  Gradual  Duration:  4 days  Timing:  Constant  Progression:  Waxing and waning  Chronicity:  New  Context:  Lower abdominal pain with history of multiple prior surgeries  Associated symptoms: abdominal pain        Prior to Admission Medications   Prescriptions Last Dose Informant Patient Reported? Taking?   Multiple Vitamin tablet 7/2/2024 Self Yes Yes   Sig: Take by mouth   esomeprazole (NexIUM) 20 mg capsule 7/2/2024  Yes Yes   Sig: Take 20 mg by mouth every morning before breakfast   iron polysaccharides (FERREX) 150 mg capsule   No No   Sig: Take 1 capsule (150 mg total) by mouth 2 (two) times a day for 15 days   vitamin B-12 (CYANOCOBALAMIN) 50 MCG tablet   Yes Yes   Sig: Take by mouth daily      Facility-Administered Medications: None       Past Medical History:   Diagnosis Date    Roque's esophagus     Low iron        Past Surgical History:   Procedure Laterality Date    CHOLECYSTECTOMY      COLON SURGERY      COLONOSCOPY      GASTRIC BYPASS      GASTROSTOMY      HERNIA REPAIR      TUBAL LIGATION         Family History   Problem Relation Age of Onset    Heart disease Mother     Diabetes Sister     Thyroid cancer Son     Heart disease Maternal Grandmother     Diabetes Maternal Grandmother     Stroke Maternal Grandmother     ALS Maternal Grandfather     ALS Maternal Aunt     ALS Cousin     Thyroid disease Sister     Colon cancer Neg Hx     Colon polyps Neg Hx      I have  reviewed and agree with the history as documented.    E-Cigarette/Vaping    E-Cigarette Use Never User      E-Cigarette/Vaping Substances     Social History     Tobacco Use    Smoking status: Never    Smokeless tobacco: Never   Vaping Use    Vaping status: Never Used   Substance Use Topics    Alcohol use: Not Currently    Drug use: Not Currently       Review of Systems   Gastrointestinal:  Positive for abdominal pain.   All other systems reviewed and are negative.      Physical Exam  Physical Exam  Vitals and nursing note reviewed.   Constitutional:       General: She is not in acute distress.     Appearance: She is not toxic-appearing.   HENT:      Head: Normocephalic and atraumatic.      Right Ear: External ear normal.      Left Ear: External ear normal.   Eyes:      Extraocular Movements: Extraocular movements intact.      Pupils: Pupils are equal, round, and reactive to light.   Cardiovascular:      Rate and Rhythm: Regular rhythm.      Pulses: Normal pulses.      Heart sounds: No murmur heard.     No gallop.   Pulmonary:      Effort: Pulmonary effort is normal. No respiratory distress.      Breath sounds: No wheezing or rhonchi.   Abdominal:      General: There is no distension.      Palpations: Abdomen is soft.      Tenderness: There is abdominal tenderness.      Comments: Generalized lower abdominal tenderness worse on the right side   Musculoskeletal:         General: No swelling, tenderness, deformity or signs of injury. Normal range of motion.      Cervical back: Normal range of motion and neck supple.      Right lower leg: No edema.      Left lower leg: No edema.   Skin:     General: Skin is warm and dry.      Coloration: Skin is not jaundiced.      Findings: No bruising, erythema or rash.   Neurological:      General: No focal deficit present.      Mental Status: She is alert and oriented to person, place, and time.      Motor: No weakness.      Gait: Gait normal.   Psychiatric:         Mood and Affect:  Mood normal.         Behavior: Behavior normal.         Vital Signs  ED Triage Vitals [07/03/24 0746]   Temperature Pulse Respirations Blood Pressure SpO2   98.3 °F (36.8 °C) 83 16 112/83 97 %      Temp Source Heart Rate Source Patient Position - Orthostatic VS BP Location FiO2 (%)   Oral Monitor Lying Right arm --      Pain Score       5           Vitals:    07/03/24 1228 07/03/24 1238 07/03/24 1239 07/03/24 1526   BP: 120/82 120/82 120/82 119/72   Pulse:  80 81 75   Patient Position - Orthostatic VS:             Visual Acuity      ED Medications  Medications   sodium chloride 0.9 % infusion (100 mL/hr Intravenous New Bag 7/3/24 1342)   piperacillin-tazobactam (ZOSYN) IVPB (EXTENDED INFUSION) 4.5 g (4.5 g Intravenous Given 7/3/24 1514)   ketorolac (TORADOL) injection 15 mg (15 mg Intravenous Given 7/3/24 1528)   pantoprazole (PROTONIX) EC tablet 20 mg (20 mg Oral Given 7/3/24 1304)   multivitamin stress formula tablet 1 tablet (1 tablet Oral Given 7/3/24 1304)   enoxaparin (LOVENOX) subcutaneous injection 40 mg (40 mg Subcutaneous Given 7/3/24 1304)   sodium chloride 0.9 % bolus 1,000 mL (0 mL Intravenous Stopped 7/3/24 0904)   iohexol (OMNIPAQUE) 350 MG/ML injection (MULTI-DOSE) 100 mL (100 mL Intravenous Given 7/3/24 0944)   iohexol (OMNIPAQUE) 240 MG/ML solution 50 mL (50 mL Oral Given 7/3/24 0944)   piperacillin-tazobactam (ZOSYN) IVPB 4.5 g (4.5 g Intravenous New Bag 7/3/24 1133)       Diagnostic Studies  Results Reviewed       Procedure Component Value Units Date/Time    C-reactive protein [[id number]]  (Abnormal) Collected: 07/03/24 0751    Lab Status: Final result Specimen: Blood from Arm, Right Updated: 07/03/24 1251     CRP 55.0 mg/L     Procalcitonin [[id number]]  (Normal) Collected: 07/03/24 0751    Lab Status: Final result Specimen: Blood from Arm, Right Updated: 07/03/24 1251     Procalcitonin <0.05 ng/ml     Lactic acid, plasma (w/reflex if result > 2.0) [[id number]]  (Normal) Collected: 07/03/24 1114     Lab Status: Final result Specimen: Blood from Hand, Right Updated: 07/03/24 1156     LACTIC ACID 1.8 mmol/L     Narrative:      Result may be elevated if tourniquet was used during collection.    Blood culture #1 [[id number]] Collected: 07/03/24 1125    Lab Status: In process Specimen: Blood from Arm, Left Updated: 07/03/24 1130    Blood culture #2 [[id number]] Collected: 07/03/24 1117    Lab Status: In process Specimen: Blood from Hand, Right Updated: 07/03/24 1130    Urine Microscopic [[id number]]  (Abnormal) Collected: 07/03/24 0950    Lab Status: Final result Specimen: Urine, Clean Catch Updated: 07/03/24 1010     RBC, UA None Seen /hpf      WBC, UA None Seen /hpf      Epithelial Cells Occasional /hpf      Bacteria, UA Moderate /hpf     UA w Reflex to Microscopic w Reflex to Culture [[id number]]  (Abnormal) Collected: 07/03/24 0950    Lab Status: Final result Specimen: Urine, Clean Catch Updated: 07/03/24 1010     Color, UA Light Yellow     Clarity, UA Clear     Specific Gravity, UA <1.005     pH, UA 5.5     Leukocytes, UA Negative     Nitrite, UA Positive     Protein, UA Negative mg/dl      Glucose, UA 30 (3/100%) mg/dl      Ketones, UA Trace mg/dl      Urobilinogen, UA <2.0 mg/dl      Bilirubin, UA Negative     Occult Blood, UA Negative    Protime-INR [[id number]]  (Abnormal) Collected: 07/03/24 0829    Lab Status: Final result Specimen: Blood from Arm, Right Updated: 07/03/24 0855     Protime 15.0 seconds      INR 1.13    APTT [[id number]]  (Normal) Collected: 07/03/24 0829    Lab Status: Final result Specimen: Blood from Arm, Right Updated: 07/03/24 0855     PTT 29 seconds     Comprehensive metabolic panel [[id number]] Collected: 07/03/24 0751    Lab Status: Final result Specimen: Blood from Arm, Right Updated: 07/03/24 0818     Sodium 138 mmol/L      Potassium 4.0 mmol/L      Chloride 104 mmol/L      CO2 28 mmol/L      ANION GAP 6 mmol/L      BUN 18 mg/dL      Creatinine 0.75 mg/dL      Glucose 93 mg/dL       Calcium 9.3 mg/dL      AST 20 U/L      ALT 18 U/L      Alkaline Phosphatase 101 U/L      Total Protein 6.9 g/dL      Albumin 4.1 g/dL      Total Bilirubin 0.70 mg/dL      eGFR 86 ml/min/1.73sq m     Narrative:      [employer] guidelines for Chronic Kidney Disease (CKD):     Stage 1 with normal or high GFR (GFR > 90 mL/min/1.73 square meters)    Stage 2 Mild CKD (GFR = 60-89 mL/min/1.73 square meters)    Stage 3A Moderate CKD (GFR = 45-59 mL/min/1.73 square meters)    Stage 3B Moderate CKD (GFR = 30-44 mL/min/1.73 square meters)    Stage 4 Severe CKD (GFR = 15-29 mL/min/1.73 square meters)    Stage 5 End Stage CKD (GFR <15 mL/min/1.73 square meters)  Note: GFR calculation is accurate only with a steady state creatinine    Lipase [[id number]]  (Normal) Collected: 07/03/24 0751    Lab Status: Final result Specimen: Blood from Arm, Right Updated: 07/03/24 0818     Lipase 40 u/L     CBC and differential [[id number]]  (Abnormal) Collected: 07/03/24 0751    Lab Status: Final result Specimen: Blood from Arm, Right Updated: 07/03/24 0802     WBC 15.87 Thousand/uL      RBC 4.54 Million/uL      Hemoglobin 12.9 g/dL      Hematocrit 41.3 %      MCV 91 fL      MCH 28.4 pg      MCHC 31.2 g/dL      RDW 12.7 %      MPV 10.4 fL      Platelets 288 Thousands/uL      nRBC 0 /100 WBCs      Segmented % 78 %      Immature Grans % 1 %      Lymphocytes % 12 %      Monocytes % 8 %      Eosinophils Relative 1 %      Basophils Relative 0 %      Absolute Neutrophils 12.23 Thousands/µL      Absolute Immature Grans 0.11 Thousand/uL      Absolute Lymphocytes 1.96 Thousands/µL      Absolute Monocytes 1.31 Thousand/µL      Eosinophils Absolute 0.20 Thousand/µL      Basophils Absolute 0.06 Thousands/µL                    CT abdomen pelvis with contrast   Final Result by Adam Childers MD (07/03 1043)      1.  Short segment thickening of the descending colon with adjacent mesenteric edema/fluid. While there appears to be a  diverticula in this region, it is not definitely inflamed and other etiologies of infectious or inflammatory colitis is not excluded.    Colonic neoplasm is less likely though follow-up colonoscopy is recommended following acute illness.      2.  Mild ascites which is likely related to the inflammatory process though perihepatic and perisplenic ascites is not typical for focal colonic inflammation/diverticulitis.      3.  Status post gastric bypass with moderate hiatal hernia and gastroesophageal reflux.      4.  Moderate to severe bladder distention.      5.  Mottled appearance of the bones with scattered lucencies, not present previously. This may be metabolic though multiple myeloma is not excluded. Clinical correlation is recommended.      The study was marked in EPIC for immediate notification.         Workstation performed: [id number]                    Procedures  Procedures         ED Course                            Initial Sepsis Screening       Row Name 07/03/24 1056                Is the patient's history suggestive of a new or worsening infection? Yes (Proceed)  -CHERYL        Suspected source of infection acute abdominal infection  -CHERYL        Indicate SIRS criteria Leukocytosis (WBC > 19323 IJL) OR Leukopenia (WBC <4000 IJL) OR Bandemia (WBC >10% bands)  -CHERYL        Are two or more of the above signs & symptoms of infection both present and new to the patient? No  -CHERYL                  User Key  (r) = Recorded By, (t) = Taken By, (c) = Cosigned By      Initials Name Provider Type    CHERYL Arriaga DO Physician                    SBIRT 22yo+      Flowsheet Row Most Recent Value   Initial Alcohol Screen: US AUDIT-C     1. How often do you have a drink containing alcohol? 0 Filed at: 07/03/2024 0749   2. How many drinks containing alcohol do you have on a typical day you are drinking?  0 Filed at: 07/03/2024 0749   3a. Male UNDER 65: How often do you have five or more drinks on one occasion? 0 Filed at:  07/03/2024 0749   3b. FEMALE Any Age, or MALE 65+: How often do you have 4 or more drinks on one occassion? 0 Filed at: 07/03/2024 0749   Audit-C Score 0 Filed at: 07/03/2024 0749   BLAIR: How many times in the past year have you...    Used an illegal drug or used a prescription medication for non-medical reasons? Never Filed at: 07/03/2024 0749                      Medical Decision Making  Abdominal pain differential includes diverticulitis bowel obstruction kidney stone workup in progress including lab work and CAT scan    Amount and/or Complexity of Data Reviewed  Labs: ordered.  Radiology: ordered.    Risk  Prescription drug management.  Decision regarding hospitalization.             Disposition  Final diagnoses:   Colitis   Ascites   Bony abnormality - Mottled on CAT scan recommended evaluation for multiple myeloma     Time reflects when diagnosis was documented in both MDM as applicable and the Disposition within this note       Time User Action Codes Description Comment    7/3/2024 11:07 AM Jeffy Arriaga [K52.9] Colitis     7/3/2024 11:08 AM Jeffy Arriaga Add [R18.8] Ascites     7/3/2024 11:08 AM Jeffy Arriaga Add [Q79.9] Bony abnormality     7/3/2024 11:08 AM Jeffy Arriaga Modify [Q79.9] Bony abnormality Mottled on CAT scan recommended evaluation for multiple myeloma          ED Disposition       ED Disposition   Admit    Condition   Stable    Date/Time   Wed Jul 3, 2024 1128    Comment   Case was discussed with Dr Hazel and the patient's admission status was agreed to be Admission Status: inpatient status to the service of Dr. Hazel .               Follow-up Information    None         Current Discharge Medication List        CONTINUE these medications which have NOT CHANGED    Details   esomeprazole (NexIUM) 20 mg capsule Take 20 mg by mouth every morning before breakfast      Multiple Vitamin tablet Take by mouth      vitamin B-12 (CYANOCOBALAMIN) 50 MCG tablet Take by mouth daily      iron  polysaccharides (FERREX) 150 mg capsule Take 1 capsule (150 mg total) by mouth 2 (two) times a day for 15 days  Qty: 30 capsule, Refills: 0    Associated Diagnoses: Iron deficiency anemia, unspecified iron deficiency anemia type             No discharge procedures on file.    PDMP Review       None            ED Provider  Electronically Signed by             Jeffy Arriaga DO  07/03/24 0568

## 2024-07-03 NOTE — PROGRESS NOTES
[facility]  Progress Note  Name: Rajani Hillman I  MRN: [id number]  Unit/Bed#: -01 I Date of Admission: 7/3/2024   Date of Service: 7/3/2024 I Hospital Day: 0    Assessment & Plan   * Colitis  Assessment & Plan  CT abd/pelvis: 1.  Short segment thickening of the descending colon with adjacent mesenteric edema/fluid. While there appears to be a diverticula in this region, it is not definitely inflamed and other etiologies of infectious or inflammatory colitis is not excluded.   Colonic neoplasm is less likely though follow-up colonoscopy is recommended following acute illness.   2.  Mild ascites which is likely related to the inflammatory process though perihepatic and perisplenic ascites is not typical for focal colonic inflammation/diverticulitis.   3.  Status post gastric bypass with moderate hiatal hernia and gastroesophageal reflux.   4.  Moderate to severe bladder distention.   5.  Mottled appearance of the bones with scattered lucencies, not present previously. This may be metabolic though multiple myeloma is not excluded. Clinical correlation is recommended.     Clear liquid diet for now  GI consult  IV Zosyn  Stool enteric panel  Fecal calprotectin  No metabolic abnormalities such as hypercalcemia or renal impairment to suggest multiple myeloma.              Chief Complaint   Patient presents with    Abdominal Pain     Started Sunday; no N/V/D        HPI:  Rajani Hillman is a 59 y.o. female who presents with GI symptoms.  The patient states she went to [facility] with a [employer] friend and after consuming the meal there, developed abdominal pain primarily lower in nature but has since moved slightly upward.  5 out of 10 intensity.  Denies bloody diarrhea.  Denies fevers or chills.  Denies emesis.  Denies sick contacts.  Denies chest pain.  Denies neurological complaints.  Denies dyspnea.    Historical Information   Past Medical History:   Diagnosis Date    Roque's  esophagus     Low iron      Past Surgical History:   Procedure Laterality Date    CHOLECYSTECTOMY      COLON SURGERY      COLONOSCOPY      GASTRIC BYPASS      GASTROSTOMY      HERNIA REPAIR      TUBAL LIGATION       Social History   Social History     Substance and Sexual Activity   Alcohol Use Not Currently     Social History     Substance and Sexual Activity   Drug Use Not Currently     Social History     Tobacco Use   Smoking Status Never   Smokeless Tobacco Never     Family History   Problem Relation Age of Onset    Heart disease Mother     Diabetes Sister     Thyroid cancer Son     Heart disease Maternal Grandmother     Diabetes Maternal Grandmother     Stroke Maternal Grandmother     ALS Maternal Grandfather     ALS Maternal Aunt     ALS Cousin     Thyroid disease Sister     Colon cancer Neg Hx     Colon polyps Neg Hx        Meds/Allergies   No Known Allergies    Meds:    Current Facility-Administered Medications:     enoxaparin (LOVENOX) subcutaneous injection 40 mg, 40 mg, Subcutaneous, Daily, Lion Suarez MD    ketorolac (TORADOL) injection 15 mg, 15 mg, Intravenous, Q6H PRN, Lion Suarez MD    multivitamin stress formula tablet 1 tablet, 1 tablet, Oral, Daily, Lion Suarez MD    pantoprazole (PROTONIX) EC tablet 20 mg, 20 mg, Oral, Daily Before Breakfast, Lion Suarez MD    piperacillin-tazobactam (ZOSYN) IVPB (EXTENDED INFUSION) 4.5 g, 4.5 g, Intravenous, Q8H, Lion Suarez MD    sodium chloride 0.9 % infusion, 100 mL/hr, Intravenous, Continuous, Lion Suarez MD      Medications Prior to Admission:     esomeprazole (NexIUM) 20 mg capsule    iron polysaccharides (FERREX) 150 mg capsule    Multiple Vitamin tablet      Review of Systems:    A complete and comprehensive 14 point organ system review was performed and all other systems are negative other than stated above in the HPI    Current Vitals:   Blood Pressure: 120/82 (07/03/24 1239)  Pulse: 81 (07/03/24  1239)  Temperature: 97.7 °F (36.5 °C) (07/03/24 1239)  Temp Source: Oral (07/03/24 0746)  Respirations: 20 (07/03/24 1153)  Height: 5' (152.4 cm) (07/03/24 1153)  Weight - Scale: 62.6 kg (138 lb) (07/03/24 1153)  SpO2: 95 % (07/03/24 1239)  SPO2 RA Rest      Flowsheet Row ED to Hosp-Admission (Current) from 7/3/2024 in [facility] Med Surg Unit   SpO2 95 %   SpO2 Activity At Rest   O2 Device None (Room air)   O2 Flow Rate --            Intake/Output Summary (Last 24 hours) at 7/3/2024 1242  Last data filed at 7/3/2024 0904  Gross per 24 hour   Intake 1000 ml   Output --   Net 1000 ml     Body mass index is 26.95 kg/m².     Physical Exam:       General: well appearing, no acute distress  HEENT: atraumatic, PERRLA, moist mucosa, normal pharynx, normal tonsils and adenoids, normal tongue, no fluid in sinuses  Neck: Trachea midline, no carotid bruit, no masses  Respiratory: normal chest wall expansion, CTA B, no r/r/w, no rubs  Cardiovascular: RRR, no m/r/g, Normal S1 and S2  Abdomen: Soft, mild-tender, non-distended, normal bowel sounds in all quadrants, no hepatosplenomegaly, no tympany  Rectal: deferred  Musculoskeletal: normal ROM in upper and lower extremities  Integumentary: warm, dry, and pink, with no rash, purpura, or petechia  Heme/Lymph: no lymphadenopathy, no bruises  Neurological: Cranial Nerves II-XII grossly intact; no focal deficits in sensation or strength  Psychiatric: cooperative with normal mood and affect    Lab Results:   CBC:   Lab Results   Component Value Date    WBC 15.87 (H) 07/03/2024    HGB 12.9 07/03/2024    HCT 41.3 07/03/2024    MCV 91 07/03/2024     07/03/2024    RBC 4.54 07/03/2024    MCH 28.4 07/03/2024    MCHC 31.2 (L) 07/03/2024    RDW 12.7 07/03/2024    MPV 10.4 07/03/2024    NRBC 0 07/03/2024     CMP:  Lab Results   Component Value Date     07/03/2024     10/19/2022    CO2 28 07/03/2024    CO2 28 10/19/2022    BUN 18 07/03/2024    BUN 19  "10/19/2022    CREATININE 0.75 07/03/2024    CALCIUM 9.3 07/03/2024    CALCIUM 9.2 10/19/2022    AST 20 07/03/2024    AST 29 10/19/2022    ALT 18 07/03/2024    ALT 21 10/19/2022    ALKPHOS 101 07/03/2024    ALKPHOS 108 10/19/2022    EGFR 86 07/03/2024     No results found for: \"TROPONINI\", \"CKMB\", \"CKTOTAL\"  Coagulation:   Lab Results   Component Value Date    INR 1.13 07/03/2024    Urinalysis:  Lab Results   Component Value Date    COLORU Light Yellow 07/03/2024    CLARITYU Clear 07/03/2024    SPECGRAV <1.005 (L) 07/03/2024    PHUR 5.5 07/03/2024    LEUKOCYTESUR Negative 07/03/2024    NITRITE Positive (A) 07/03/2024    GLUCOSEU 30 (3/100%) (A) 07/03/2024    KETONESU Trace (A) 07/03/2024    BILIRUBINUR Negative 07/03/2024    BLOODU Negative 07/03/2024      Amylase: No results found for: \"AMYLASE\"  Lipase:   Lab Results   Component Value Date    LIPASE 40 07/03/2024        Imaging: CT abdomen pelvis with contrast    Result Date: 7/3/2024  Narrative: CT ABDOMEN AND PELVIS WITH IV CONTRAST INDICATION: Minimal pain history of gastric bypass. COMPARISON: 3/27/2012. TECHNIQUE: CT examination of the abdomen and pelvis was performed. Multiplanar 2D reformatted images were created from the source data. This examination, like all CT scans performed in the [facility], was performed utilizing techniques to minimize radiation dose exposure, including the use of iterative reconstruction and automated exposure control. Radiation dose length product (DLP) for this visit: 441 mGy-cm IV Contrast: 100 mL of iohexol (OMNIPAQUE) Enteric Contrast: Administered. FINDINGS: ABDOMEN LOWER CHEST: Moderate hiatal hernia with gastroesophageal reflux. There is trace bilateral pleural fluid, particularly on the right. LIVER/BILIARY TREE: Mild biliary ductal dilatation, likely on the basis of cholecystectomy. GALLBLADDER: Post cholecystectomy. SPLEEN: Unremarkable. PANCREAS: Unremarkable. ADRENAL GLANDS: Unremarkable. " KIDNEYS/URETERS: There is a left renal cyst. No hydronephrosis. STOMACH AND BOWEL: Status post gastric bypass without complication. There is thickening of a short segment of the descending colon with adjacent mesenteric fluid/edema. There may be a diverticula on series 601 image 49 though not definitely inflamed. Small bowel is unremarkable. APPENDIX: No findings to suggest appendicitis. ABDOMINOPELVIC CAVITY: There is mild perihepatic, perisplenic, pelvic and bilateral paracolic gutter fluid. No pneumoperitoneum. No lymphadenopathy. VESSELS: Unremarkable for patient's age. PELVIS REPRODUCTIVE ORGANS: There is a 2.6 cm left adnexal cyst. Cysts less than 3 cm do not require routine follow-up. Probable fibroid uterus. URINARY BLADDER: There is moderate to severe bladder distention. ABDOMINAL WALL/INGUINAL REGIONS: Unremarkable. BONES: There are small lucencies throughout the bones which were not present previously.     Impression: 1.  Short segment thickening of the descending colon with adjacent mesenteric edema/fluid. While there appears to be a diverticula in this region, it is not definitely inflamed and other etiologies of infectious or inflammatory colitis is not excluded. Colonic neoplasm is less likely though follow-up colonoscopy is recommended following acute illness. 2.  Mild ascites which is likely related to the inflammatory process though perihepatic and perisplenic ascites is not typical for focal colonic inflammation/diverticulitis. 3.  Status post gastric bypass with moderate hiatal hernia and gastroesophageal reflux. 4.  Moderate to severe bladder distention. 5.  Mottled appearance of the bones with scattered lucencies, not present previously. This may be metabolic though multiple myeloma is not excluded. Clinical correlation is recommended. The study was marked in EPIC for immediate notification. Workstation performed: [id number]     EKG, Pathology, and Other Studies: I have personally reviewed the  "results.  VTE   Prophylaxis: In place    Code Status: Level 1 - Full Code    Anticipated Length of Stay:  Patient will be admitted on an Inpatient basis with an anticipated length of stay of  greater 2 midnights.       \"This note has been constructed using a voice recognition system\"      Lion Suarez MD  7/3/2024, 12:42 PM        "

## 2024-07-03 NOTE — ASSESSMENT & PLAN NOTE
CT abd/pelvis: 1.  Short segment thickening of the descending colon with adjacent mesenteric edema/fluid. While there appears to be a diverticula in this region, it is not definitely inflamed and other etiologies of infectious or inflammatory colitis is not excluded.   Colonic neoplasm is less likely though follow-up colonoscopy is recommended following acute illness.   2.  Mild ascites which is likely related to the inflammatory process though perihepatic and perisplenic ascites is not typical for focal colonic inflammation/diverticulitis.   3.  Status post gastric bypass with moderate hiatal hernia and gastroesophageal reflux.   4.  Moderate to severe bladder distention.   5.  Mottled appearance of the bones with scattered lucencies, not present previously. This may be metabolic though multiple myeloma is not excluded. Clinical correlation is recommended.     Clear liquid diet for now  GI consult  IV Zosyn  Stool enteric panel  Fecal calprotectin  No metabolic abnormalities such as hypercalcemia or renal impairment to suggest multiple myeloma.

## 2024-07-04 VITALS
HEART RATE: 68 BPM | RESPIRATION RATE: 22 BRPM | SYSTOLIC BLOOD PRESSURE: 94 MMHG | BODY MASS INDEX: 27.09 KG/M2 | OXYGEN SATURATION: 93 % | WEIGHT: 138 LBS | TEMPERATURE: 97.9 F | HEIGHT: 60 IN | DIASTOLIC BLOOD PRESSURE: 65 MMHG

## 2024-07-04 LAB
ALBUMIN SERPL BCG-MCNC: 3.3 G/DL (ref 3.5–5)
ALP SERPL-CCNC: 79 U/L (ref 34–104)
ALT SERPL W P-5'-P-CCNC: 14 U/L (ref 7–52)
ANION GAP SERPL CALCULATED.3IONS-SCNC: 7 MMOL/L (ref 4–13)
AST SERPL W P-5'-P-CCNC: 20 U/L (ref 13–39)
BASOPHILS # BLD AUTO: 0.05 THOUSANDS/ÂΜL (ref 0–0.1)
BASOPHILS NFR BLD AUTO: 1 % (ref 0–1)
BILIRUB SERPL-MCNC: 0.74 MG/DL (ref 0.2–1)
BUN SERPL-MCNC: 13 MG/DL (ref 5–25)
CALCIUM ALBUM COR SERPL-MCNC: 9 MG/DL (ref 8.3–10.1)
CALCIUM SERPL-MCNC: 8.4 MG/DL (ref 8.4–10.2)
CHLORIDE SERPL-SCNC: 108 MMOL/L (ref 96–108)
CO2 SERPL-SCNC: 24 MMOL/L (ref 21–32)
CREAT SERPL-MCNC: 0.76 MG/DL (ref 0.6–1.3)
EOSINOPHIL # BLD AUTO: 0.22 THOUSAND/ÂΜL (ref 0–0.61)
EOSINOPHIL NFR BLD AUTO: 2 % (ref 0–6)
ERYTHROCYTE [DISTWIDTH] IN BLOOD BY AUTOMATED COUNT: 12.8 % (ref 11.6–15.1)
GFR SERPL CREATININE-BSD FRML MDRD: 84 ML/MIN/1.73SQ M
GLUCOSE SERPL-MCNC: 92 MG/DL (ref 65–140)
HCT VFR BLD AUTO: 35.4 % (ref 34.8–46.1)
HGB BLD-MCNC: 11.1 G/DL (ref 11.5–15.4)
IMM GRANULOCYTES # BLD AUTO: 0.03 THOUSAND/UL (ref 0–0.2)
IMM GRANULOCYTES NFR BLD AUTO: 0 % (ref 0–2)
LYMPHOCYTES # BLD AUTO: 1.76 THOUSANDS/ÂΜL (ref 0.6–4.47)
LYMPHOCYTES NFR BLD AUTO: 18 % (ref 14–44)
MCH RBC QN AUTO: 28.7 PG (ref 26.8–34.3)
MCHC RBC AUTO-ENTMCNC: 31.4 G/DL (ref 31.4–37.4)
MCV RBC AUTO: 92 FL (ref 82–98)
MONOCYTES # BLD AUTO: 0.91 THOUSAND/ÂΜL (ref 0.17–1.22)
MONOCYTES NFR BLD AUTO: 9 % (ref 4–12)
NEUTROPHILS # BLD AUTO: 7.03 THOUSANDS/ÂΜL (ref 1.85–7.62)
NEUTS SEG NFR BLD AUTO: 70 % (ref 43–75)
NRBC BLD AUTO-RTO: 0 /100 WBCS
PLATELET # BLD AUTO: 222 THOUSANDS/UL (ref 149–390)
PMV BLD AUTO: 10.6 FL (ref 8.9–12.7)
POTASSIUM SERPL-SCNC: 3.6 MMOL/L (ref 3.5–5.3)
PROT SERPL-MCNC: 5.5 G/DL (ref 6.4–8.4)
RBC # BLD AUTO: 3.87 MILLION/UL (ref 3.81–5.12)
SODIUM SERPL-SCNC: 139 MMOL/L (ref 135–147)
WBC # BLD AUTO: 10 THOUSAND/UL (ref 4.31–10.16)

## 2024-07-04 PROCEDURE — 99239 HOSP IP/OBS DSCHRG MGMT >30: CPT | Performed by: HOSPITALIST

## 2024-07-04 PROCEDURE — 85025 COMPLETE CBC W/AUTO DIFF WBC: CPT | Performed by: HOSPITALIST

## 2024-07-04 PROCEDURE — 99232 SBSQ HOSP IP/OBS MODERATE 35: CPT | Performed by: INTERNAL MEDICINE

## 2024-07-04 PROCEDURE — 80053 COMPREHEN METABOLIC PANEL: CPT | Performed by: HOSPITALIST

## 2024-07-04 RX ORDER — AMOXICILLIN AND CLAVULANATE POTASSIUM 875; 125 MG/1; MG/1
1 TABLET, FILM COATED ORAL EVERY 12 HOURS SCHEDULED
Qty: 16 TABLET | Refills: 0 | Status: SHIPPED | OUTPATIENT
Start: 2024-07-04 | End: 2024-07-12

## 2024-07-04 RX ADMIN — ENOXAPARIN SODIUM 40 MG: 40 INJECTION SUBCUTANEOUS at 07:39

## 2024-07-04 RX ADMIN — B-COMPLEX W/ C & FOLIC ACID TAB 1 TABLET: TAB at 07:39

## 2024-07-04 RX ADMIN — PIPERACILLIN SODIUM AND TAZOBACTAM SODIUM 4.5 G: 4; .5 INJECTION, POWDER, LYOPHILIZED, FOR SOLUTION INTRAVENOUS at 06:44

## 2024-07-04 RX ADMIN — PANTOPRAZOLE SODIUM 20 MG: 20 TABLET, DELAYED RELEASE ORAL at 06:44

## 2024-07-04 RX ADMIN — KETOROLAC TROMETHAMINE 15 MG: 30 INJECTION, SOLUTION INTRAMUSCULAR; INTRAVENOUS at 04:43

## 2024-07-04 NOTE — PLAN OF CARE
Problem: PAIN - ADULT  Goal: Verbalizes/displays adequate comfort level or baseline comfort level  Description: Interventions:  - Encourage patient to monitor pain and request assistance  - Assess pain using   Problem: INFECTION - ADULT  Goal: Absence or prevention of progression during hospitalization  Description: INTERVENTIONS:  - Assess and monitor for signs and symptoms of infection  - Monitor lab/diagnostic results  - Monitor all insertion sites, i.e. indwelling lines, tubes, and drains  - Monitor endotracheal if appropriate and nasal secretions for changes in amount and color  - [place] appropriate cooling/warming therapies per order  - Administer medications as ordered  - Instruct and encourage patient and family to use good hand hygiene technique  - Identify and instruct in appropriate isolation precautions for identified infection/condition  Outcome: Progressing  Goal: Absence of fever/infection during neutropenic period  Description: INTERVENTIONS:  - Monitor WBC    Outcome: Progressing     Problem: DISCHARGE PLANNING  Goal: Discharge to home or other facility with appropriate resources  Description: INTERVENTIONS:  - Identify barriers to discharge w/patient and caregiver  - Arrange for needed discharge resources and transportation as appropriate  - Identify discharge learning needs (meds, wound care, etc.)  - Arrange for interpretive services to assist at discharge as needed  - Refer to Case Management Department for coordinating discharge planning if the patient needs post-hospital services based on physician/advanced practitioner order or complex needs related to functional status, cognitive ability, or social support system  Outcome: Progressing     Problem: Knowledge Deficit  Goal: Patient/family/caregiver demonstrates understanding of disease process, treatment plan, medications, and discharge instructions  Description: Complete learning assessment and assess knowledge base.  Interventions:  -  Provide teaching at level of understanding  - Provide teaching via preferred learning methods  Outcome: Progressing   appropriate pain scale  - Administer analgesics based on type and severity of pain and evaluate response  - Implement non-pharmacological measures as appropriate and evaluate response  - Consider cultural and social influences on pain and pain management  - Notify physician/advanced practitioner if interventions unsuccessful or patient reports new pain  Outcome: Progressing

## 2024-07-04 NOTE — UTILIZATION REVIEW
NOTIFICATION OF INPATIENT ADMISSION   AUTHORIZATION REQUEST   SERVICING FACILITY:   [place] [address]  Tax ID: [id number]  NPI: [id number] ATTENDING PROVIDER:  Attending Name and NPI#: Lion Suarez Md [[id number]]  Address: [address] [place] [address]  Phone: [phone]   ADMISSION INFORMATION:  Place of Service: Inpatient [facility]  Place of Service Code: 21  Inpatient Admission Date/Time: 7/3/24 11:31 AM  Discharge Date/Time: No discharge date for patient encounter.  Admitting Diagnosis Code/Description:  Colitis [K52.9]  Abdominal pain [R10.9]  Bony abnormality [Q79.9]  Ascites [R18.8]     UTILIZATION REVIEW CONTACT:  Triny Magaña, Utilization   Network Utilization Review Department  Phone: [phone]  Fax: [phone]  Email: [email or web address]  Contact for approvals/pending authorizations, clinical reviews, and discharge.     PHYSICIAN ADVISORY SERVICES:  Medical Necessity Denial & [id number] Review  Phone: [phone]  Fax: [phone]  Email: [email or web address]     DISCHARGE SUPPORT TEAM:  For Patients Discharge Needs & Updates  Phone: [phone] opt. 2 Fax: [phone]  Email: [email or web address]

## 2024-07-04 NOTE — DISCHARGE SUMMARY
[facility]  Discharge- Rajani Hillman 1965, 59 y.o. female MRN: [id number]  Unit/Bed#: MS [facility]01 Encounter: [id number]  Primary Care Provider: HIRAM Chairez   Date and time admitted to hospital: 7/3/2024  7:42 AM    * Colitis  Assessment & Plan  CT abd/pelvis: 1.  Short segment thickening of the descending colon with adjacent mesenteric edema/fluid. While there appears to be a diverticula in this region, it is not definitely inflamed and other etiologies of infectious or inflammatory colitis is not excluded.   Colonic neoplasm is less likely though follow-up colonoscopy is recommended following acute illness.   2.  Mild ascites which is likely related to the inflammatory process though perihepatic and perisplenic ascites is not typical for focal colonic inflammation/diverticulitis.   3.  Status post gastric bypass with moderate hiatal hernia and gastroesophageal reflux.   4.  Moderate to severe bladder distention.   5.  Mottled appearance of the bones with scattered lucencies, not present previously. This may be metabolic though multiple myeloma is not excluded. Clinical correlation is recommended.     Advance diet. Sx improving on abx.  GI consult appreciated. Pt will follow up with gi for outpt colonoscopy   IV Zosyn - > augmentin at [place]  Stool enteric panel  Fecal calprotectin  No metabolic abnormalities such as hypercalcemia or renal impairment to suggest multiple myeloma.       Hospital Course:     Rajani Hillman is a 59 y.o. female patient who originally presented to the hospital on   Admission Orders (From admission, onward)       Ordered        07/03/24 1131  INPATIENT ADMISSION  Once                         due to colitis suspected 2/2 diverticulitis. Pt treated with IV abx therapy and had steady improvement of symptoms. Pt will follow up with Gi after discharge for colonoscopy and complete a course of augmentin at the time of discharge.     Please see above list  of diagnoses and related plan for additional information.     Physical Exam:    GEN: No acute distress, comfortable  HEEENT: No JVD, PERRLA, no scleral icterus  RESP: Lungs clear to auscultation bilaterally  CV: RRR, +s1/s2   ABD: SOFT NON TENDER, POSITIVE BOWEL SOUNDS, NO DISTENTION  PSYCH: CALM  NEURO: Mentation baseline, NO FOCAL DEFICITS  SKIN: NO RASH  EXTREM: NO EDEMA    CONSULTING PROVIDERS   IP CONSULT TO GASTROENTEROLOGY    PROCEDURES PERFORMED  * No surgery found *    RADIOLOGY RESULTS  CT abdomen pelvis with contrast    Result Date: 7/3/2024  Narrative: CT ABDOMEN AND PELVIS WITH IV CONTRAST INDICATION: Minimal pain history of gastric bypass. COMPARISON: 3/27/2012. TECHNIQUE: CT examination of the abdomen and pelvis was performed. Multiplanar 2D reformatted images were created from the source data. This examination, like all CT scans performed in the [facility], was performed utilizing techniques to minimize radiation dose exposure, including the use of iterative reconstruction and automated exposure control. Radiation dose length product (DLP) for this visit: 441 mGy-cm IV Contrast: 100 mL of iohexol (OMNIPAQUE) Enteric Contrast: Administered. FINDINGS: ABDOMEN LOWER CHEST: Moderate hiatal hernia with gastroesophageal reflux. There is trace bilateral pleural fluid, particularly on the right. LIVER/BILIARY TREE: Mild biliary ductal dilatation, likely on the basis of cholecystectomy. GALLBLADDER: Post cholecystectomy. SPLEEN: Unremarkable. PANCREAS: Unremarkable. ADRENAL GLANDS: Unremarkable. KIDNEYS/URETERS: There is a left renal cyst. No hydronephrosis. STOMACH AND BOWEL: Status post gastric bypass without complication. There is thickening of a short segment of the descending colon with adjacent mesenteric fluid/edema. There may be a diverticula on series 601 image 49 though not definitely inflamed. Small bowel is unremarkable. APPENDIX: No findings to suggest appendicitis.  ABDOMINOPELVIC CAVITY: There is mild perihepatic, perisplenic, pelvic and bilateral paracolic gutter fluid. No pneumoperitoneum. No lymphadenopathy. VESSELS: Unremarkable for patient's age. PELVIS REPRODUCTIVE ORGANS: There is a 2.6 cm left adnexal cyst. Cysts less than 3 cm do not require routine follow-up. Probable fibroid uterus. URINARY BLADDER: There is moderate to severe bladder distention. ABDOMINAL WALL/INGUINAL REGIONS: Unremarkable. BONES: There are small lucencies throughout the bones which were not present previously.     Impression: 1.  Short segment thickening of the descending colon with adjacent mesenteric edema/fluid. While there appears to be a diverticula in this region, it is not definitely inflamed and other etiologies of infectious or inflammatory colitis is not excluded. Colonic neoplasm is less likely though follow-up colonoscopy is recommended following acute illness. 2.  Mild ascites which is likely related to the inflammatory process though perihepatic and perisplenic ascites is not typical for focal colonic inflammation/diverticulitis. 3.  Status post gastric bypass with moderate hiatal hernia and gastroesophageal reflux. 4.  Moderate to severe bladder distention. 5.  Mottled appearance of the bones with scattered lucencies, not present previously. This may be metabolic though multiple myeloma is not excluded. Clinical correlation is recommended. The study was marked in EPIC for immediate notification. Workstation performed: [id number]       LABS  Results from last 7 days   Lab Units 07/04/24 0412 07/03/24  0829 07/03/24  0751   WBC Thousand/uL 10.00  --  15.87*   HEMOGLOBIN g/dL 11.1*  --  12.9   HEMATOCRIT % 35.4  --  41.3   MCV fL 92  --  91   PLATELETS Thousands/uL 222  --  288   INR   --  1.13  --      Results from last 7 days   Lab Units 07/04/24 0412 07/03/24  0751   SODIUM mmol/L 139 138   POTASSIUM mmol/L 3.6 4.0   CHLORIDE mmol/L 108 104   CO2 mmol/L 24 28   BUN mg/dL 13 18    CREATININE mg/dL 0.76 0.75   CALCIUM mg/dL 8.4 9.3   ALBUMIN g/dL 3.3* 4.1   TOTAL BILIRUBIN mg/dL 0.74 0.70   ALK PHOS U/L 79 101   ALT U/L 14 18   AST U/L 20 20   EGFR ml/min/1.73sq m 84 86   GLUCOSE RANDOM mg/dL 92 93                              Results from last 7 days   Lab Units 07/03/24  1117 07/03/24  0751   LACTIC ACID mmol/L 1.8  --    PROCALCITONIN ng/ml  --  <0.05           Cultures:   Results from last 7 days   Lab Units 07/03/24  0950   COLOR UA  Light Yellow   CLARITY UA  Clear   SPEC GRAV UA  <1.005*   PH UA  5.5   LEUKOCYTES UA  Negative   NITRITE UA  Positive*   GLUCOSE UA mg/dl 30 (3/100%)*   KETONES UA mg/dl Trace*   BILIRUBIN UA  Negative   BLOOD UA  Negative      Results from last 7 days   Lab Units 07/03/24  0950   RBC UA /hpf None Seen   WBC UA /hpf None Seen   EPITHELIAL CELLS WET PREP /hpf Occasional   BACTERIA UA /hpf Moderate*      Results from last 7 days   Lab Units 07/03/24  1125 07/03/24  1117   BLOOD CULTURE  Received in Microbiology Lab. Culture in Progress. Received in Microbiology Lab. Culture in Progress.         Condition at Discharge:  good      Discharge instructions/Information to patient and family:   See after visit summary for information provided to patient and family.  The above hospital course, results, incidental findings, need for follow up was discussed in detail with the patient and/or family.    Provisions for Follow-Up Care:  See after visit summary for information related to follow-up care and any pertinent home health orders.      Disposition:     Home       Discharge Statement:  I spent 37 minutes discharging the patient. This time was spent on the day of discharge. I had direct contact with the patient on the day of discharge. Greater than 50% of the total time was spent examining patient, answering all patient questions, arranging and discussing plan of care with patient as well as directly providing post-discharge instructions.  Additional time then spent  on discharge activities. Reviewed with spouse bedside    Discharge Medications:  See after visit summary for reconciled discharge medications provided to patient and family.      ** Please Note: This note has been constructed using a voice recognition system **

## 2024-07-04 NOTE — PROGRESS NOTES
Progress note - Gastroenterology   Rajani Hillman 59 y.o. female MRN: [id number]  Unit/Bed#: -01 Encounter: [id number]    ASSESSMENT and PLAN    This is a 59F w h/o JudesEnriqueY in 2005 w SBO, bowel resection in past, adm with acute onset abd pain after eating out at a restaurant. No n/v/f/c/d. Just pain. CT neg for SBO but showing some short segment desc colon colitis, poss diverticulitis vs infectious colitis vs ? Ischemia in setting of poor hydration this week during the heat wave.     Last colon in 2019 neg.      Abd pain - resolved  Presumed diverticulitis  Improved today.  CT reviewed  Continue Zosyn here while pt's diet advanced. If tolerating lunch, OK for discharge home with PO antibiotics for total of 10 days  Outpatient colonoscopy to further assess in 6-8 weeks  GERD  Barretts esophagus  Cont ppi  Egd recall 11/2024  H/o Denisse en Y gastric bypass  H/o SBO and small bowel resection    Chief Complaint   Patient presents with    Abdominal Pain     Started Sunday; no N/V/D       SUBJECTIVE/HPI   No more abd pain, tolerating PO    BP 94/65 (BP Location: Left arm)   Pulse 68   Temp 97.9 °F (36.6 °C) (Temporal)   Resp 22   Ht 5' (1.524 m)   Wt 62.6 kg (138 lb)   SpO2 93%   BMI 26.95 kg/m²     PHYSICALEXAM  General appearance: alert, appears stated age and cooperative  Eyes: JANINA, EOMI, no scleral icterus   Head: Normocephalic, without obvious abnormality, atraumatic  Lungs: clear to auscultation bilaterally, no c/w/r  Heart: regular rate and rhythm, S1, S2 normal, no murmur, click, rub or gallop  Abdomen: soft, non-tender, non-distended; bowel sounds normal; no masses,  no organomegaly  Extremities: extremities normal, atraumatic, no clubbing or cyanosis. No LE edema  Neurologic: Grossly normal, AAOx3, no asterixis    Lab Results   Component Value Date    CALCIUM 8.4 07/04/2024    K 3.6 07/04/2024    CO2 24 07/04/2024     07/04/2024    BUN 13 07/04/2024    CREATININE 0.76 07/04/2024  "    Lab Results   Component Value Date    WBC 10.00 07/04/2024    HGB 11.1 (L) 07/04/2024    HCT 35.4 07/04/2024    MCV 92 07/04/2024     07/04/2024     Lab Results   Component Value Date    ALT 14 07/04/2024    AST 20 07/04/2024    ALKPHOS 79 07/04/2024     No results found for: \"AMYLASE\"  Lab Results   Component Value Date    LIPASE 40 07/03/2024     Lab Results   Component Value Date    IRON 17 (L) 11/04/2022    TIBC 609 (H) 11/04/2022    FERRITIN 3 (L) 11/04/2022     Lab Results   Component Value Date    INR 1.13 07/03/2024       "

## 2024-07-04 NOTE — ASSESSMENT & PLAN NOTE
CT abd/pelvis: 1.  Short segment thickening of the descending colon with adjacent mesenteric edema/fluid. While there appears to be a diverticula in this region, it is not definitely inflamed and other etiologies of infectious or inflammatory colitis is not excluded.   Colonic neoplasm is less likely though follow-up colonoscopy is recommended following acute illness.   2.  Mild ascites which is likely related to the inflammatory process though perihepatic and perisplenic ascites is not typical for focal colonic inflammation/diverticulitis.   3.  Status post gastric bypass with moderate hiatal hernia and gastroesophageal reflux.   4.  Moderate to severe bladder distention.   5.  Mottled appearance of the bones with scattered lucencies, not present previously. This may be metabolic though multiple myeloma is not excluded. Clinical correlation is recommended.     Advance diet. Sx improving on abx.  GI consult appreciated. Pt will follow up with gi for outpt colonoscopy   IV Zosyn - > augmentin at [place]  Stool enteric panel  Fecal calprotectin  No metabolic abnormalities such as hypercalcemia or renal impairment to suggest multiple myeloma.

## 2024-07-04 NOTE — UTILIZATION REVIEW
Initial Clinical Review    Admission: Date/Time/Statement:   Admission Orders (From admission, onward)       Ordered        07/03/24 1131  INPATIENT ADMISSION  Once                          Orders Placed This Encounter   Procedures    INPATIENT ADMISSION     Standing Status:   Standing     Number of Occurrences:   1     Order Specific Question:   Level of Care     Answer:   Med Surg [16]     Order Specific Question:   Estimated length of stay     Answer:   More than 2 Midnights     Order Specific Question:   Certification     Answer:   I certify that inpatient services are medically necessary for this patient for a duration of greater than two midnights. See H&P and MD Progress Notes for additional information about the patient's course of treatment.     ED Arrival Information       Expected   -    Arrival   7/3/2024 07:38    Acuity   Urgent              Means of arrival   Walk-In    Escorted by   Spouse    Service   Hospitalist    Admission type   Emergency              Arrival complaint   ABD Pain             Chief Complaint   Patient presents with    Abdominal Pain     Started Sunday; no N/V/D       Initial Presentation: 59 y.o. female to ED presents for Abdominal pain. Pt states she went to [facility] with a [employer] friend and after consuming the meal there, developed abdominal pain primarily lower in nature but has since moved slightly upward. 5 out of 10 intensity. PMH for GERD. Barretts esophagus.Denisse en Y gastric bypass. SBO and small bowel resection.  Admit to Inpatient Dx; Colitis.   Plan; Clear liquid for now. Iv antibiotics. Iv fluids. GI consult. Stool enteric panel. Fecal calprotectin. Pain control.  CT abd/pelvis: 1.  Short segment thickening of the descending colon with adjacent mesenteric edema/fluid. While there appears to be a diverticula in this region, it is not definitely inflamed and other etiologies of infectious or inflammatory colitis is not excluded.   Colonic neoplasm is less likely  though follow-up colonoscopy is recommended following acute illness.   2.  Mild ascites which is likely related to the inflammatory process though perihepatic and perisplenic ascites is not typical for focal colonic inflammation/diverticulitis.   3.  Status post gastric bypass with moderate hiatal hernia and gastroesophageal reflux.   4.  Moderate to severe bladder distention.   5.  Mottled appearance of the bones with scattered lucencies, not present previously. This may be metabolic though multiple myeloma is not excluded. Clinical correlation is recommended.     7/3  GI cons; Abd pain, Presumed Diverticulitis. Continue Iv antibiotics. [place]arks for now, advance diet tomorrow if tolerating clears. Stool cultures. PPI.   Slowly improving.     Date: 7/4   Day 2:   Per GI; Continue Iv antibiotics. Iv fluids. Advance diet. Pt improved today. F/u for outpt colonoscopy.    Advance diet. Iv antibiotics - > augmentin at dc . Pain control. Outpt f/u with GI.  Symptoms improving on antibiotics.    ED Triage Vitals [07/03/24 0746]   Temperature Pulse Respirations Blood Pressure SpO2 Pain Score   98.3 °F (36.8 °C) 83 16 112/83 97 % 5     Weight (last 2 days)       Date/Time Weight    07/03/24 1153 62.6 (138)            Vital Signs (last 3 days)       Date/Time Temp Pulse Resp BP MAP (mmHg) SpO2 O2 Device Patient Position - Orthostatic VS Pain    07/04/24 07:36:24 97.9 °F (36.6 °C) 68 22 94/65 75 93 % None (Room air) -- 3    07/04/24 0443 -- -- -- -- -- -- -- -- 7    07/04/24 0136 -- -- -- -- -- -- None (Room air) -- 2    07/03/24 18:54:11 97.9 °F (36.6 °C) 73 -- 100/67 78 95 % None (Room air) Lying --    07/03/24 1528 -- -- -- -- -- -- -- -- 6    07/03/24 15:26:28 97.7 °F (36.5 °C) 75 16 119/72 88 97 % -- -- --    07/03/24 1300 -- -- -- -- -- -- -- -- 5    07/03/24 1258 -- -- -- -- -- -- -- -- 5 07/03/24 12:39:03 97.7 °F (36.5 °C) 81 -- 120/82 95 95 % -- -- --    07/03/24 12:38:36 -- 80 -- 120/82 95 97 % -- -- --    07/03/24  12:28:06 -- -- -- 120/82 95 -- -- -- --    07/03/24 1153 -- 83 20 116/69 88 98 % None (Room air) Lying --    07/03/24 1130 -- 79 15 118/70 89 97 % None (Room air) Lying 5    07/03/24 1030 -- 71 15 119/68 87 99 % None (Room air) Lying --    07/03/24 0900 -- 71 18 112/69 85 98 % None (Room air) Lying --    07/03/24 0746 98.3 °F (36.8 °C) 83 16 112/83 -- 97 % None (Room air) Lying 5              Pertinent Labs/Diagnostic Test Results:   Radiology:  CT abdomen pelvis with contrast   Final Interpretation by Adam Childers MD (07/03 1043)      1.  Short segment thickening of the descending colon with adjacent mesenteric edema/fluid. While there appears to be a diverticula in this region, it is not definitely inflamed and other etiologies of infectious or inflammatory colitis is not excluded.    Colonic neoplasm is less likely though follow-up colonoscopy is recommended following acute illness.      2.  Mild ascites which is likely related to the inflammatory process though perihepatic and perisplenic ascites is not typical for focal colonic inflammation/diverticulitis.      3.  Status post gastric bypass with moderate hiatal hernia and gastroesophageal reflux.      4.  Moderate to severe bladder distention.      5.  Mottled appearance of the bones with scattered lucencies, not present previously. This may be metabolic though multiple myeloma is not excluded. Clinical correlation is recommended.      The study was marked in EPIC for immediate notification.         Workstation performed: [id number]           Cardiology:  No orders to display     GI:  No orders to display           Results from last 7 days   Lab Units 07/04/24  0412 07/03/24  0751   WBC Thousand/uL 10.00 15.87*   HEMOGLOBIN g/dL 11.1* 12.9   HEMATOCRIT % 35.4 41.3   PLATELETS Thousands/uL 222 288   TOTAL NEUT ABS Thousands/µL 7.03 12.23*         Results from last 7 days   Lab Units 07/04/24  0412 07/03/24  0751   SODIUM mmol/L 139 138   POTASSIUM mmol/L 3.6  4.0   CHLORIDE mmol/L 108 104   CO2 mmol/L 24 28   ANION GAP mmol/L 7 6   BUN mg/dL 13 18   CREATININE mg/dL 0.76 0.75   EGFR ml/min/1.73sq m 84 86   CALCIUM mg/dL 8.4 9.3     Results from last 7 days   Lab Units 07/04/24  0412 07/03/24  0751   AST U/L 20 20   ALT U/L 14 18   ALK PHOS U/L 79 101   TOTAL PROTEIN g/dL 5.5* 6.9   ALBUMIN g/dL 3.3* 4.1   TOTAL BILIRUBIN mg/dL 0.74 0.70         Results from last 7 days   Lab Units 07/04/24  0412 07/03/24  0751   GLUCOSE RANDOM mg/dL 92 93       Results from last 7 days   Lab Units 07/03/24  0829   PROTIME seconds 15.0*   INR  1.13   PTT seconds 29         Results from last 7 days   Lab Units 07/03/24  0751   PROCALCITONIN ng/ml <0.05     Results from last 7 days   Lab Units 07/03/24  1117   LACTIC ACID mmol/L 1.8       Results from last 7 days   Lab Units 07/03/24  0751   LIPASE u/L 40     Results from last 7 days   Lab Units 07/03/24  0751   CRP mg/L 55.0*             Results from last 7 days   Lab Units 07/03/24  0950   CLARITY UA  Clear   COLOR UA  Light Yellow   SPEC GRAV UA  <1.005*   PH UA  5.5   GLUCOSE UA mg/dl 30 (3/100%)*   KETONES UA mg/dl Trace*   BLOOD UA  Negative   PROTEIN UA mg/dl Negative   NITRITE UA  Positive*   BILIRUBIN UA  Negative   UROBILINOGEN UA (BE) mg/dl <2.0   LEUKOCYTES UA  Negative   WBC UA /hpf None Seen   RBC UA /hpf None Seen   BACTERIA UA /hpf Moderate*   EPITHELIAL CELLS WET PREP /hpf Occasional       Results from last 7 days   Lab Units 07/03/24  1125 07/03/24  1117   BLOOD CULTURE  Received in Microbiology Lab. Culture in Progress. Received in Microbiology Lab. Culture in Progress.       ED Treatment-Medication Administration from 07/03/2024 0738 to 07/03/2024 1216         Date/Time Order Dose Route Action     07/03/2024 0755 sodium chloride 0.9 % bolus 1,000 mL 1,000 mL Intravenous New Bag     07/03/2024 0944 iohexol (OMNIPAQUE) 350 MG/ML injection (MULTI-DOSE) 100 mL 100 mL Intravenous Given     07/03/2024 0941 iohexol (OMNIPAQUE)  240 MG/ML solution 50 mL 50 mL Oral Given     07/03/2024 1133 piperacillin-tazobactam (ZOSYN) IVPB 4.5 g 4.5 g Intravenous New Bag            Past Medical History:   Diagnosis Date    Roque's esophagus     Low iron      Present on Admission:  **None**      Admitting Diagnosis: Colitis [K52.9]  Abdominal pain [R10.9]  Bony abnormality [Q79.9]  Ascites [R18.8]  Age/Sex: 59 y.o. female  Admission Orders:  Scheduled Medications:  enoxaparin, 40 mg, Subcutaneous, Daily  multivitamin stress formula, 1 tablet, Oral, Daily  pantoprazole, 20 mg, Oral, Daily Before Breakfast  piperacillin-tazobactam, 4.5 g, Intravenous, Q8H      Continuous IV Infusions:  sodium chloride, 100 mL/hr, Intravenous, Continuous      PRN Meds:  ketorolac, 15 mg, Intravenous, Q6H PRN 7/3 x2, 7/4 x1        IP CONSULT TO GASTROENTEROLOGY    Network Utilization Review Department  ATTENTION: Please call with any questions or concerns to [phone] and carefully listen to the prompts so that you are directed to the right person. All voicemails are confidential.   For Discharge needs, contact Care Management DC Support Team at [phone] opt. 2  Send all requests for admission clinical reviews, approved or denied determinations and any other requests to dedicated fax number below belonging to the campus where the patient is receiving treatment. List of dedicated fax numbers for the Facilities:  FACILITY NAME UR FAX NUMBER   ADMISSION DENIALS (Administrative/Medical Necessity) [phone]   DISCHARGE SUPPORT TEAM (NETWORK) [phone]   PARENT CHILD HEALTH (Maternity/NICU/Pediatrics) [phone]   [facility] [phone]   [facility] [phone]   [facility] [phone]   [facility] [phone]   [facility] [phone]   [facility] [phone]   [facility]  [phone]   [facility] [phone]   [facility] [phone]   [facility] [phone]   [facility] [phone]   [facility] [phone]          no fever and no chills.

## 2024-07-04 NOTE — PLAN OF CARE
Problem: PAIN - ADULT  Goal: Verbalizes/displays adequate comfort level or baseline comfort level  Description: Interventions:  - Encourage patient to monitor pain and request assistance  - Assess pain using appropriate pain scale  - Administer analgesics based on type and severity of pain and evaluate response  - Implement non-pharmacological measures as appropriate and evaluate response  - Consider cultural and social influences on pain and pain management  - Notify physician/advanced practitioner if interventions unsuccessful or patient reports new pain  Outcome: Progressing     Problem: INFECTION - ADULT  Goal: Absence or prevention of progression during hospitalization  Description: INTERVENTIONS:  - Assess and monitor for signs and symptoms of infection  - Monitor lab/diagnostic results  - Monitor all insertion sites, i.e. indwelling lines, tubes, and drains  - Monitor endotracheal if appropriate and nasal secretions for changes in amount and color  - [place] appropriate cooling/warming therapies per order  - Administer medications as ordered  - Instruct and encourage patient and family to use good hand hygiene technique  - Identify and instruct in appropriate isolation precautions for identified infection/condition  Outcome: Progressing  Goal: Absence of fever/infection during neutropenic period  Description: INTERVENTIONS:  - Monitor WBC    Outcome: Progressing     Problem: DISCHARGE PLANNING  Goal: Discharge to home or other facility with appropriate resources  Description: INTERVENTIONS:  - Identify barriers to discharge w/patient and caregiver  - Arrange for needed discharge resources and transportation as appropriate  - Identify discharge learning needs (meds, wound care, etc.)  - Arrange for interpretive services to assist at discharge as needed  - Refer to Case Management Department for coordinating discharge planning if the patient needs post-hospital services based on physician/advanced  practitioner order or complex needs related to functional status, cognitive ability, or social support system  Outcome: Progressing     Problem: Knowledge Deficit  Goal: Patient/family/caregiver demonstrates understanding of disease process, treatment plan, medications, and discharge instructions  Description: Complete learning assessment and assess knowledge base.  Interventions:  - Provide teaching at level of understanding  - Provide teaching via preferred learning methods  Outcome: Progressing

## 2024-07-04 NOTE — PLAN OF CARE
Problem: PAIN - ADULT  Goal: Verbalizes/displays adequate comfort level or baseline comfort level  Description: Interventions:  - Encourage patient to monitor pain and request assistance  - Assess pain using appropriate pain scale  - Administer analgesics based on type and severity of pain and evaluate response  - Implement non-pharmacological measures as appropriate and evaluate response  - Consider cultural and social influences on pain and pain management  - Notify physician/advanced practitioner if interventions unsuccessful or patient reports new pain  7/4/2024 0135 by Seamus Jerome RN  Outcome: Progressing  7/4/2024 0134 by Seamus Jerome RN  Outcome: Progressing     Problem: INFECTION - ADULT  Goal: Absence or prevention of progression during hospitalization  Description: INTERVENTIONS:  - Assess and monitor for signs and symptoms of infection  - Monitor lab/diagnostic results  - Monitor all insertion sites, i.e. indwelling lines, tubes, and drains  - Monitor endotracheal if appropriate and nasal secretions for changes in amount and color  - [place] appropriate cooling/warming therapies per order  - Administer medications as ordered  - Instruct and encourage patient and family to use good hand hygiene technique  - Identify and instruct in appropriate isolation precautions for identified infection/condition  7/4/2024 0135 by Seamus Jerome RN  Outcome: Progressing  7/4/2024 0134 by Seamus Jerome RN  Outcome: Progressing  Goal: Absence of fever/infection during neutropenic period  Description: INTERVENTIONS:  - Monitor WBC    7/4/2024 0135 by Seamus Jerome RN  Outcome: Progressing  7/4/2024 0134 by Seamus Jerome RN  Outcome: Progressing     Problem: DISCHARGE PLANNING  Goal: Discharge to home or other facility with appropriate resources  Description: INTERVENTIONS:  - Identify barriers to discharge w/patient and caregiver  - Arrange for needed discharge resources and transportation as appropriate  - Identify  discharge learning needs (meds, wound care, etc.)  - Arrange for interpretive services to assist at discharge as needed  - Refer to Case Management Department for coordinating discharge planning if the patient needs post-hospital services based on physician/advanced practitioner order or complex needs related to functional status, cognitive ability, or social support system  7/4/2024 0135 by Seamus Jerome RN  Outcome: Progressing  7/4/2024 0134 by Seamus Jerome RN  Outcome: Progressing     Problem: Knowledge Deficit  Goal: Patient/family/caregiver demonstrates understanding of disease process, treatment plan, medications, and discharge instructions  Description: Complete learning assessment and assess knowledge base.  Interventions:  - Provide teaching at level of understanding  - Provide teaching via preferred learning methods  7/4/2024 0135 by Seamus Jerome RN  Outcome: Progressing  7/4/2024 0134 by Seamus Jerome RN  Outcome: Progressing

## 2024-07-05 ENCOUNTER — TELEPHONE (OUTPATIENT)
Dept: GASTROENTEROLOGY | Facility: CLINIC | Age: 59
End: 2024-07-05

## 2024-07-05 ENCOUNTER — TRANSITIONAL CARE MANAGEMENT (OUTPATIENT)
Dept: FAMILY MEDICINE CLINIC | Facility: CLINIC | Age: 59
End: 2024-07-05

## 2024-07-05 LAB
C COLI+JEJUNI TUF STL QL NAA+PROBE: NEGATIVE
EC STX1+STX2 GENES STL QL NAA+PROBE: NEGATIVE
SALMONELLA SP SPAO STL QL NAA+PROBE: NEGATIVE
SHIGELLA SP+EIEC IPAH STL QL NAA+PROBE: NEGATIVE

## 2024-07-05 NOTE — TELEPHONE ENCOUNTER
----- Message from Dulce James MD sent at 7/4/2024 11:41 AM EDT -----  Regarding: Needs OFV  Please schedule pt for office visit in 2-3 weeks w any available provider for post discharge follow up. Thank you.

## 2024-07-05 NOTE — UTILIZATION REVIEW
NOTIFICATION OF ADMISSION DISCHARGE   This is a Notification of Discharge from [facility]. Please be advised that this patient has been discharge from our facility. Below you will find the admission and discharge date and time including the patient’s disposition.   UTILIZATION REVIEW CONTACT:  Naomi Franks  Utilization   Network Utilization Review Department  Phone: [phone] x carefully listen to the prompts. All voicemails are confidential.  Email: [email or web address]     ADMISSION INFORMATION  PRESENTATION DATE: 7/3/2024  7:42 AM  OBERVATION ADMISSION DATE: N/A  INPATIENT ADMISSION DATE: 7/3/24 11:31 AM   DISCHARGE DATE: 7/4/2024  1:40 PM   DISPOSITION:Home/Self Care    Network Utilization Review Department  ATTENTION: Please call with any questions or concerns to [phone] and carefully listen to the prompts so that you are directed to the right person. All voicemails are confidential.   For Discharge needs, contact Care Management DC Support Team at [phone] opt. 2  Send all requests for admission clinical reviews, approved or denied determinations and any other requests to dedicated fax number below belonging to the campus where the patient is receiving treatment. List of dedicated fax numbers for the Facilities:  FACILITY NAME UR FAX NUMBER   ADMISSION DENIALS (Administrative/Medical Necessity) [phone]   DISCHARGE SUPPORT TEAM ([facility]) [phone]   PARENT CHILD HEALTH (Maternity/NICU/Pediatrics) [phone]   [facility] [phone]   [facility] [phone]   [facility] [phone]   [facility] [phone]   [facility] [phone]   [facility] [phone]   [facility] [phone]   [facility] [phone]   [facility] [phone]   [facility] [phone]   [facility] [phone]   [facility] [phone]

## 2024-07-07 LAB
BACTERIA BLD CULT: NORMAL
BACTERIA BLD CULT: NORMAL
CALPROTECTIN STL-MCNT: 80 UG/G (ref 0–120)

## 2024-07-08 ENCOUNTER — OFFICE VISIT (OUTPATIENT)
Dept: FAMILY MEDICINE CLINIC | Facility: CLINIC | Age: 59
End: 2024-07-08
Payer: COMMERCIAL

## 2024-07-08 VITALS
HEART RATE: 78 BPM | SYSTOLIC BLOOD PRESSURE: 106 MMHG | WEIGHT: 134 LBS | OXYGEN SATURATION: 97 % | TEMPERATURE: 98 F | DIASTOLIC BLOOD PRESSURE: 70 MMHG | BODY MASS INDEX: 26.17 KG/M2

## 2024-07-08 DIAGNOSIS — Z12.11 SCREEN FOR COLON CANCER: ICD-10-CM

## 2024-07-08 DIAGNOSIS — K52.9 COLITIS: ICD-10-CM

## 2024-07-08 DIAGNOSIS — E78.2 MIXED HYPERLIPIDEMIA: ICD-10-CM

## 2024-07-08 DIAGNOSIS — Z76.89 ENCOUNTER FOR SUPPORT AND COORDINATION OF TRANSITION OF CARE: Primary | ICD-10-CM

## 2024-07-08 DIAGNOSIS — D50.9 IRON DEFICIENCY ANEMIA, UNSPECIFIED IRON DEFICIENCY ANEMIA TYPE: ICD-10-CM

## 2024-07-08 DIAGNOSIS — E53.8 VITAMIN B12 DEFICIENCY: ICD-10-CM

## 2024-07-08 DIAGNOSIS — C90.00 MULTIPLE MYELOMA, REMISSION STATUS UNSPECIFIED (HCC): ICD-10-CM

## 2024-07-08 LAB
BACTERIA BLD CULT: NORMAL
BACTERIA BLD CULT: NORMAL

## 2024-07-08 PROCEDURE — 99495 TRANSJ CARE MGMT MOD F2F 14D: CPT

## 2024-07-08 NOTE — LETTER
July 8, 2024     Patient: Rajani Hillman  YOB: 1965  Date of Visit: 7/8/2024      To Whom it May Concern:    Rajani Hillman is under my professional care. Rajani was seen in my office on 7/8/2024. Rajani may return to work on 7/15/24 . Please excuse her absences due to hospitalization and illness.     If you have any questions or concerns, please don't hesitate to call.         Sincerely,          HIRAM Queen        CC: No Recipients

## 2024-07-08 NOTE — ASSESSMENT & PLAN NOTE
Patient reports that abdominal pain has decreased. Has occasional cramping and loose stools (approx 3x/day). Continues with augmentin. Reports that she has significant fatigue and anorexia. Encouraged rest and pedialyte. Patient to schedule GI appt.     Mottled bones noted on CT-- r/o multiple myeloma-- immunofixation and protein electrophoresis ordered.

## 2024-07-08 NOTE — PROGRESS NOTES
Transition of Care Visit  Name: Rajani Hillman      : 1965      MRN: [id number]  Encounter Provider: HIRAM Queen  Encounter Date: 2024   Encounter department: [facility]    Assessment & Plan   1. Encounter for support and coordination of transition of care  2. Multiple myeloma, remission status unspecified (HCC)  -     Protein electrophoresis, serum; Future  -     Immunofixation, Serum(Reflex Only-Do Not Order); Future  -     Protein electrophoresis, serum  -     Immunofixation, Serum(Reflex Only-Do Not Order)  3. Iron deficiency anemia, unspecified iron deficiency anemia type  -     CBC and differential; Future  -     Comprehensive metabolic panel; Future  -     TIBC Panel (incl. Iron, TIBC, % Iron Saturation); Future  -     CBC and differential  -     Comprehensive metabolic panel  -     Iron, TIBC and Ferritin Panel; Future  -     Iron, TIBC and Ferritin Panel  4. Screen for colon cancer  -     Ambulatory Referral to Gastroenterology; Future  5. Colitis  Assessment & Plan:  Patient reports that abdominal pain has decreased. Has occasional cramping and loose stools (approx 3x/day). Continues with augmentin. Reports that she has significant fatigue and anorexia. Encouraged rest and pedialyte. Patient to schedule GI appt.     Mottled bones noted on CT-- r/o multiple myeloma-- immunofixation and protein electrophoresis ordered.   Orders:  -     Comprehensive metabolic panel; Future  -     C-reactive protein; Future  -     Comprehensive metabolic panel  -     C-reactive protein  6. Mixed hyperlipidemia  -     Lipid Panel with Direct LDL reflex; Future  -     Lipid Panel with Direct LDL reflex  7. Vitamin B12 deficiency  -     Vitamin B12; Future  -     Vitamin B12      Depression Screening and Follow-up Plan: Patient was screened for depression during today's encounter. They screened negative with a PHQ-2 score of 0.        History of Present Illness      Transitional Care Management Review:   Rajani Hillman is a 59 y.o. female here for TCM follow up.     During the TCM phone call patient stated:  TCM Call       Date and time call was made  7/5/2024  3:25 PM    Hospital care reviewed  Records reviewed    Patient was hospitialized at  [facility]    Date of Admission  07/03/24    Date of discharge  07/04/24    Diagnosis  Colitis    Disposition  Home    Were the patients medications reviewed and updated  Yes    Current Symptoms  None          TCM Call       Post hospital issues  None    Scheduled for follow up?  Yes    Did you obtain your prescribed medications  Yes    Do you need help managing your prescriptions or medications  No    Is transportation to your appointment needed  No    I have advised the patient to call PCP with any new or worsening symptoms  Tami NEAL MA.          TCM sp hospitalization for colitis.       Review of Systems   Constitutional:  Negative for activity change.   HENT:  Negative for congestion, ear pain, rhinorrhea and sore throat.    Eyes:  Negative for pain.   Respiratory:  Negative for cough, shortness of breath and wheezing.    Cardiovascular:  Negative for chest pain and leg swelling.   Gastrointestinal:  Negative for abdominal distention, abdominal pain, diarrhea (loose stools not diarrhea per patient), nausea and vomiting.   Musculoskeletal:  Negative for arthralgias and myalgias.   Skin:  Negative for rash.   Neurological:  Negative for dizziness, weakness and numbness.   All other systems reviewed and are negative.    Objective     /70 (BP Location: Left arm, Patient Position: Sitting, Cuff Size: Standard)   Pulse 78   Temp 98 °F (36.7 °C) (Tympanic)   Wt 60.8 kg (134 lb)   SpO2 97%   BMI 26.17 kg/m²     Physical Exam  Vitals and nursing note reviewed.   Constitutional:       General: She is not in acute distress.     Appearance: Normal appearance. She is well-developed. She is not ill-appearing.   HENT:       Head: Normocephalic and atraumatic.      Right Ear: External ear normal.      Left Ear: External ear normal.   Eyes:      Conjunctiva/sclera: Conjunctivae normal.   Cardiovascular:      Rate and Rhythm: Normal rate and regular rhythm.      Heart sounds: Normal heart sounds. No murmur heard.  Pulmonary:      Effort: Pulmonary effort is normal. No respiratory distress.      Breath sounds: Normal breath sounds.   Abdominal:      General: Bowel sounds are normal. There is no distension.      Palpations: Abdomen is soft.      Tenderness: There is abdominal tenderness (generalized--- minimal discomfort with palpation per patient).   Musculoskeletal:      Cervical back: Neck supple.   Skin:     General: Skin is warm and dry.      Capillary Refill: Capillary refill takes less than 2 seconds.   Neurological:      Mental Status: She is alert and oriented to person, place, and time. Mental status is at baseline.   Psychiatric:         Mood and Affect: Mood normal.         Thought Content: Thought content normal.         Judgment: Judgment normal.       Medications have been reviewed by provider in current encounter    Administrative Statements   I have spent a total time of 30 minutes in caring for this patient on the day of the visit/encounter including Diagnostic results, Risks and benefits of tx options, Instructions for management, Patient and family education, Importance of tx compliance, Risk factor reductions, Impressions, Documenting in the medical record, Reviewing / ordering tests, medicine, procedures  , and Obtaining or reviewing history  .

## 2024-07-09 ENCOUNTER — OFFICE VISIT (OUTPATIENT)
Dept: GASTROENTEROLOGY | Facility: CLINIC | Age: 59
End: 2024-07-09
Payer: COMMERCIAL

## 2024-07-09 ENCOUNTER — TELEPHONE (OUTPATIENT)
Dept: GASTROENTEROLOGY | Facility: CLINIC | Age: 59
End: 2024-07-09

## 2024-07-09 VITALS
WEIGHT: 136.6 LBS | SYSTOLIC BLOOD PRESSURE: 110 MMHG | HEIGHT: 60 IN | DIASTOLIC BLOOD PRESSURE: 70 MMHG | BODY MASS INDEX: 26.82 KG/M2

## 2024-07-09 DIAGNOSIS — K22.70 BARRETT'S ESOPHAGUS WITHOUT DYSPLASIA: Primary | ICD-10-CM

## 2024-07-09 DIAGNOSIS — K57.90 DIVERTICULAR DISEASE: ICD-10-CM

## 2024-07-09 DIAGNOSIS — Z98.890 HX OF COLONOSCOPY: ICD-10-CM

## 2024-07-09 PROCEDURE — 99214 OFFICE O/P EST MOD 30 MIN: CPT | Performed by: NURSE PRACTITIONER

## 2024-07-09 RX ORDER — POLYETHYLENE GLYCOL 3350 17 G/17G
238 POWDER, FOR SOLUTION ORAL ONCE
Qty: 238 G | Refills: 0 | Status: SHIPPED | OUTPATIENT
Start: 2024-07-09 | End: 2024-07-09

## 2024-07-09 RX ORDER — PANTOPRAZOLE SODIUM 20 MG/1
20 TABLET, DELAYED RELEASE ORAL DAILY
Qty: 30 TABLET | Refills: 4 | Status: SHIPPED | OUTPATIENT
Start: 2024-07-09

## 2024-07-09 NOTE — TELEPHONE ENCOUNTER
Scheduled date of combo (as of today):9-9-24  Physician performing combo:Vasyl  Location of combo:[facility]  Bowel prep reviewed with patient:Alex/Rochelle  Instructions reviewed with patient by:lorena  Clearances: no

## 2024-07-09 NOTE — PROGRESS NOTES
[facility] - Outpatient Follow-up Note  Rajani Hillman 59 y.o. female MRN: [id number]  Encounter: [id number]    ASSESSMENT AND PLAN:      1. Roque's esophagus without dysplasia  Patient with history of Roque's esophagus.  Last EGD conducted 12/14/2021 which noted positive biopsy for Roque's.  Patient recall for 3 years.  Patient is currently taking Nexium 20 mg daily however patient is requesting to possibly change to something else however she states omeprazole was not helping her in the past.  Prescription sent to pharmacy for trial with pantoprazole however if patient feels symptomatic after switching, she will contact the office to be switched back to Nexium.    - pantoprazole (PROTONIX) 20 mg tablet; Take 1 tablet (20 mg total) by mouth daily  Dispense: 30 tablet; Refill: 4  - EGD scheduled at [facility]    2. Diverticular disease  Patient hospitalized 7/3 - 7/4, she presented with abdominal pain.  CT noted colitis/ ?Diverticulitis.  Patient was initially treated with IV antibiotics and switched to p.o. Augmentin for discharge.  Patient states she has approximately 5 days remaining on her oral antibiotics.  Referred for outpatient colonoscopy.      - Colonoscopy scheduled at [facility]  - polyethylene glycol (MiraLax) 17 GM/SCOOP powder; Take 238 g by mouth once for 1 dose Take 238 g my mouth. Use as directed  Dispense: 238 g; Refill: 0    3.  Hx of Small bowel resection/ SBO  Patient notes that she does have a history of small bowel resection she believes in 2006 shortly after having gastric bypass.  She also notes she has had at least 3 episodes of small bowel obstruction since as well.    4. Hx of colonoscopy  Colonoscopy 2019; 7-year recall       Followup Appointment: After procedures  ______________________________________________________________________      HPI:    59-year-old female with past medical history of Roque's esophagus, colitis, gastric bypass (2005) and  small bowel resection presents for follow-up hospitalization at [facility] on 7/3 through 7/4.  Patient was treated with IV antibiotics for suspicious colitis/diverticulitis.  CT abdomen and pelvis 7/3;  Short segment thickening of the descending colon with adjacent mesenteric edema/fluid. While there appears to be a diverticula in this region, it is not definitely inflamed and other etiologies of infectious or inflammatory colitis is not excluded. Colonic neoplasm is less likely though follow-up colonoscopy is recommended following acute illness.  Mild ascites which is likely related to the inflammatory process though perihepatic and perisplenic ascites is not typical for focal colonic inflammation/diverticulitis.  Status post gastric bypass with moderate hiatal hernia and gastroesophageal reflux.    On exam, patient denies nausea or vomiting.  Patient states she does have loose bowel movement daily at this time however may be contributed to the eye antibiotic use.  Patient was on IV antibiotics as inpatient and states she has approximately 5 more days of oral antibiotics (Augmentin) remaining.  Patient denies hematochezia or melena.  Non-smoker, denies EtOH use.  States her weight is stable.    Patient notes last colonoscopy 2019 with 7-year recall.  Believes it was conducted while living in [place].  She does not believe she had any colon polyps in the past.    Historical Information   Past Medical History:   Diagnosis Date    Roque's esophagus     Low iron      Past Surgical History:   Procedure Laterality Date    CHOLECYSTECTOMY      COLON SURGERY      COLONOSCOPY      GASTRIC BYPASS      GASTROSTOMY      HERNIA REPAIR      TUBAL LIGATION       Social History     Substance and Sexual Activity   Alcohol Use Not Currently     Social History     Substance and Sexual Activity   Drug Use Not Currently     Social History     Tobacco Use   Smoking Status Never   Smokeless Tobacco Never     Family  History   Problem Relation Age of Onset    Heart disease Mother     Diabetes Sister     Thyroid cancer Son     Heart disease Maternal Grandmother     Diabetes Maternal Grandmother     Stroke Maternal Grandmother     ALS Maternal Grandfather     ALS Maternal Aunt     ALS Cousin     Thyroid disease Sister     Colon cancer Neg Hx     Colon polyps Neg Hx          Current Outpatient Medications:     amoxicillin-clavulanate (AUGMENTIN) 875-125 mg per tablet    Multiple Vitamin tablet    pantoprazole (PROTONIX) 20 mg tablet    polyethylene glycol (MiraLax) 17 GM/SCOOP powder    vitamin B-12 (CYANOCOBALAMIN) 50 MCG tablet  No Known Allergies  Reviewed medications and allergies and updated as indicated    PHYSICAL EXAM:    Blood pressure 110/70, height 5' (1.524 m), weight 62 kg (136 lb 9.6 oz). Body mass index is 26.68 kg/m².  General Appearance: NAD, cooperative, alert  Eyes: Anicteric, PERRLA, EOMI  ENT:  Normocephalic, atraumatic, normal mucosa.    Neck:  Supple, symmetrical, trachea midline  Resp:  Clear to auscultation bilaterally; no rales, rhonchi or wheezing; respirations unlabored   CV:  S1 S2, Regular rate and rhythm; no murmur, rub, or gallop.  GI:  Soft, lower quadrant tenderness with palpation, non-distended; normal bowel sounds; no masses, no organomegaly   Rectal: Deferred  Musculoskeletal: No cyanosis, clubbing or edema. Normal ROM.  Skin:  No jaundice, rashes, or lesions   Heme/Lymph: No palpable cervical lymphadenopathy  Psych: Normal affect, good eye contact  Neuro: No gross deficits, AAOx3    Lab Results:   Lab Results   Component Value Date    WBC 10.00 07/04/2024    HGB 11.1 (L) 07/04/2024    HCT 35.4 07/04/2024    MCV 92 07/04/2024     07/04/2024     Lab Results   Component Value Date    K 3.6 07/04/2024     07/04/2024    CO2 24 07/04/2024    BUN 13 07/04/2024    CREATININE 0.76 07/04/2024    CALCIUM 8.4 07/04/2024    CORRECTEDCA 9.0 07/04/2024    AST 20 07/04/2024    ALT 14 07/04/2024     ALKPHOS 79 07/04/2024    EGFR 84 07/04/2024     Lab Results   Component Value Date    IRON 17 (L) 11/04/2022    TIBC 609 (H) 11/04/2022    FERRITIN 3 (L) 11/04/2022     Lab Results   Component Value Date    LIPASE 40 07/03/2024       Radiology Results:   CT abdomen pelvis with contrast    Result Date: 7/3/2024  Narrative: CT ABDOMEN AND PELVIS WITH IV CONTRAST INDICATION: Minimal pain history of gastric bypass. COMPARISON: 3/27/2012. TECHNIQUE: CT examination of the abdomen and pelvis was performed. Multiplanar 2D reformatted images were created from the source data. This examination, like all CT scans performed in the [facility], was performed utilizing techniques to minimize radiation dose exposure, including the use of iterative reconstruction and automated exposure control. Radiation dose length product (DLP) for this visit: 441 mGy-cm IV Contrast: 100 mL of iohexol (OMNIPAQUE) Enteric Contrast: Administered. FINDINGS: ABDOMEN LOWER CHEST: Moderate hiatal hernia with gastroesophageal reflux. There is trace bilateral pleural fluid, particularly on the right. LIVER/BILIARY TREE: Mild biliary ductal dilatation, likely on the basis of cholecystectomy. GALLBLADDER: Post cholecystectomy. SPLEEN: Unremarkable. PANCREAS: Unremarkable. ADRENAL GLANDS: Unremarkable. KIDNEYS/URETERS: There is a left renal cyst. No hydronephrosis. STOMACH AND BOWEL: Status post gastric bypass without complication. There is thickening of a short segment of the descending colon with adjacent mesenteric fluid/edema. There may be a diverticula on series 601 image 49 though not definitely inflamed. Small bowel is unremarkable. APPENDIX: No findings to suggest appendicitis. ABDOMINOPELVIC CAVITY: There is mild perihepatic, perisplenic, pelvic and bilateral paracolic gutter fluid. No pneumoperitoneum. No lymphadenopathy. VESSELS: Unremarkable for patient's age. PELVIS REPRODUCTIVE ORGANS: There is a 2.6 cm left adnexal cyst. Cysts  less than 3 cm do not require routine follow-up. Probable fibroid uterus. URINARY BLADDER: There is moderate to severe bladder distention. ABDOMINAL WALL/INGUINAL REGIONS: Unremarkable. BONES: There are small lucencies throughout the bones which were not present previously.     Impression: 1.  Short segment thickening of the descending colon with adjacent mesenteric edema/fluid. While there appears to be a diverticula in this region, it is not definitely inflamed and other etiologies of infectious or inflammatory colitis is not excluded. Colonic neoplasm is less likely though follow-up colonoscopy is recommended following acute illness. 2.  Mild ascites which is likely related to the inflammatory process though perihepatic and perisplenic ascites is not typical for focal colonic inflammation/diverticulitis. 3.  Status post gastric bypass with moderate hiatal hernia and gastroesophageal reflux. 4.  Moderate to severe bladder distention. 5.  Mottled appearance of the bones with scattered lucencies, not present previously. This may be metabolic though multiple myeloma is not excluded. Clinical correlation is recommended. The study was marked in EPIC for immediate notification. Workstation performed: [id number]

## 2024-07-10 ENCOUNTER — TELEPHONE (OUTPATIENT)
Age: 59
End: 2024-07-10

## 2024-07-10 ENCOUNTER — OFFICE VISIT (OUTPATIENT)
Dept: FAMILY MEDICINE CLINIC | Facility: CLINIC | Age: 59
End: 2024-07-10
Payer: COMMERCIAL

## 2024-07-10 VITALS
SYSTOLIC BLOOD PRESSURE: 104 MMHG | WEIGHT: 136.4 LBS | BODY MASS INDEX: 26.64 KG/M2 | OXYGEN SATURATION: 96 % | DIASTOLIC BLOOD PRESSURE: 68 MMHG | HEART RATE: 80 BPM

## 2024-07-10 DIAGNOSIS — K52.9 COLITIS: Primary | ICD-10-CM

## 2024-07-10 PROCEDURE — 99213 OFFICE O/P EST LOW 20 MIN: CPT

## 2024-07-10 NOTE — TELEPHONE ENCOUNTER
PA for pantoprazole    Submitted via    [x]CMM-KEY  [id number]   []SurescriPowtoon-Case ID #   []Faxed to plan   []Other website   []Phone call Case ID #     Office notes sent, clinical questions answered. Awaiting determination    Turnaround time for your insurance to make a decision on your Prior Authorization can take 7-21 business days.

## 2024-07-10 NOTE — PROGRESS NOTES
Ambulatory Visit  Name: Rajani Hillman      : 1965      MRN: [id number]  Encounter Provider: HIRAM Queen  Encounter Date: 7/10/2024   Encounter department: [facility]    Assessment & Plan   1. Colitis  Assessment & Plan:  Completed disability paperwork. Continues with augmentin-- abx therapy complete . Follow up with GI in sept. Lab work completed yesterday. No results yet.       Depression Screening and Follow-up Plan: Patient was screened for depression during today's encounter. They screened negative with a PHQ-2 score of 0.      History of Present Illness     Presents for disability paperwork completion         Review of Systems   Constitutional:  Positive for fatigue. Negative for activity change and fever.   HENT:  Negative for congestion, ear pain, rhinorrhea and sore throat.    Eyes:  Negative for pain.   Respiratory:  Negative for cough, shortness of breath and wheezing.    Cardiovascular:  Negative for chest pain and leg swelling.   Gastrointestinal:  Positive for abdominal pain. Negative for diarrhea (loose stools), nausea and vomiting.   Musculoskeletal:  Positive for back pain. Negative for arthralgias and myalgias.   Skin:  Negative for rash.   Neurological:  Positive for headaches. Negative for dizziness, weakness and numbness.   All other systems reviewed and are negative.    Objective     /68   Pulse 80   Wt 61.9 kg (136 lb 6.4 oz)   SpO2 96%   BMI 26.64 kg/m²     Physical Exam  Vitals and nursing note reviewed.   Constitutional:       General: She is not in acute distress.     Appearance: Normal appearance. She is well-developed.   HENT:      Head: Normocephalic and atraumatic.      Right Ear: External ear normal.      Left Ear: External ear normal.   Eyes:      Conjunctiva/sclera: Conjunctivae normal.   Cardiovascular:      Rate and Rhythm: Normal rate and regular rhythm.      Heart sounds: Normal heart sounds. No murmur  heard.  Pulmonary:      Effort: Pulmonary effort is normal. No respiratory distress.      Breath sounds: Normal breath sounds.   Abdominal:      General: Bowel sounds are normal. There is no distension.      Palpations: Abdomen is soft.      Tenderness: There is no abdominal tenderness. There is no guarding or rebound.   Musculoskeletal:      Cervical back: Neck supple.   Skin:     General: Skin is warm and dry.      Capillary Refill: Capillary refill takes less than 2 seconds.   Neurological:      Mental Status: She is alert and oriented to person, place, and time. Mental status is at baseline.   Psychiatric:         Mood and Affect: Mood normal.         Behavior: Behavior normal.         Thought Content: Thought content normal.         Judgment: Judgment normal.       Administrative Statements   I have spent a total time of 20 minutes in caring for this patient on the day of the visit/encounter including Instructions for management, Patient and family education, Importance of tx compliance, Risk factor reductions, Documenting in the medical record, Reviewing / ordering tests, medicine, procedures  , and Obtaining or reviewing history  .

## 2024-07-10 NOTE — ASSESSMENT & PLAN NOTE
Completed disability paperwork. Continues with augmentin-- abx therapy complete 7/12. Follow up with GI in sept. Lab work completed yesterday. No results yet.

## 2024-07-11 LAB
ALBUMIN SERPL ELPH-MCNC: 3.7 G/DL (ref 3.8–4.8)
ALBUMIN SERPL-MCNC: 4 G/DL (ref 3.6–5.1)
ALBUMIN/GLOB SERPL: 1.7 (CALC) (ref 1–2.5)
ALP SERPL-CCNC: 122 U/L (ref 37–153)
ALPHA1 GLOB SERPL ELPH-MCNC: 0.4 G/DL (ref 0.2–0.3)
ALPHA2 GLOB SERPL ELPH-MCNC: 0.8 G/DL (ref 0.5–0.9)
ALT SERPL-CCNC: 33 U/L (ref 6–29)
AST SERPL-CCNC: 41 U/L (ref 10–35)
BASOPHILS # BLD AUTO: 112 CELLS/UL (ref 0–200)
BASOPHILS NFR BLD AUTO: 1.3 %
BETA1 GLOB SERPL ELPH-MCNC: 0.4 G/DL (ref 0.4–0.6)
BETA2 GLOB SERPL ELPH-MCNC: 0.4 G/DL (ref 0.2–0.5)
BILIRUB SERPL-MCNC: 0.4 MG/DL (ref 0.2–1.2)
BUN SERPL-MCNC: 14 MG/DL (ref 7–25)
BUN/CREAT SERPL: ABNORMAL (CALC) (ref 6–22)
CALCIUM SERPL-MCNC: 9.3 MG/DL (ref 8.6–10.4)
CHLORIDE SERPL-SCNC: 103 MMOL/L (ref 98–110)
CHOLEST SERPL-MCNC: 157 MG/DL
CHOLEST/HDLC SERPL: 2.2 (CALC)
CO2 SERPL-SCNC: 26 MMOL/L (ref 20–32)
CREAT SERPL-MCNC: 0.71 MG/DL (ref 0.5–1.05)
CRP SERPL-MCNC: 12.4 MG/L
EOSINOPHIL # BLD AUTO: 482 CELLS/UL (ref 15–500)
EOSINOPHIL NFR BLD AUTO: 5.6 %
ERYTHROCYTE [DISTWIDTH] IN BLOOD BY AUTOMATED COUNT: 12.2 % (ref 11–15)
FERRITIN SERPL-MCNC: 169 NG/ML (ref 16–288)
GAMMA GLOB SERPL ELPH-MCNC: 0.9 G/DL (ref 0.8–1.7)
GFR/BSA.PRED SERPLBLD CYS-BASED-ARV: 97 ML/MIN/1.73M2
GLOBULIN SER CALC-MCNC: 2.4 G/DL (CALC) (ref 1.9–3.7)
GLUCOSE SERPL-MCNC: 77 MG/DL (ref 65–99)
HCT VFR BLD AUTO: 41.5 % (ref 35–45)
HDLC SERPL-MCNC: 70 MG/DL
HGB BLD-MCNC: 13.2 G/DL (ref 11.7–15.5)
INTERPRETATION SERPL IFE-IMP: NORMAL
IRON SATN MFR SERPL: 27 % (CALC) (ref 16–45)
IRON SERPL-MCNC: 96 MCG/DL (ref 45–160)
LDLC SERPL CALC-MCNC: 71 MG/DL (CALC)
LYMPHOCYTES # BLD AUTO: 3019 CELLS/UL (ref 850–3900)
LYMPHOCYTES NFR BLD AUTO: 35.1 %
MCH RBC QN AUTO: 28.8 PG (ref 27–33)
MCHC RBC AUTO-ENTMCNC: 31.8 G/DL (ref 32–36)
MCV RBC AUTO: 90.6 FL (ref 80–100)
MONOCYTES # BLD AUTO: 645 CELLS/UL (ref 200–950)
MONOCYTES NFR BLD AUTO: 7.5 %
NEUTROPHILS # BLD AUTO: 4343 CELLS/UL (ref 1500–7800)
NEUTROPHILS NFR BLD AUTO: 50.5 %
NONHDLC SERPL-MCNC: 87 MG/DL (CALC)
PLATELET # BLD AUTO: 349 THOUSAND/UL (ref 140–400)
PMV BLD REES-ECKER: 10.3 FL (ref 7.5–12.5)
POTASSIUM SERPL-SCNC: 4.5 MMOL/L (ref 3.5–5.3)
PROT PATTERN SERPL ELPH-IMP: ABNORMAL
PROT SERPL-MCNC: 6.4 G/DL (ref 6.1–8.1)
PROT SERPL-MCNC: 6.4 G/DL (ref 6.1–8.1)
RBC # BLD AUTO: 4.58 MILLION/UL (ref 3.8–5.1)
SODIUM SERPL-SCNC: 139 MMOL/L (ref 135–146)
TIBC SERPL-MCNC: 356 MCG/DL (CALC) (ref 250–450)
TRIGL SERPL-MCNC: 75 MG/DL
VIT B12 SERPL-MCNC: 1490 PG/ML (ref 200–1100)
WBC # BLD AUTO: 8.6 THOUSAND/UL (ref 3.8–10.8)

## 2024-07-12 ENCOUNTER — TELEPHONE (OUTPATIENT)
Age: 59
End: 2024-07-12

## 2024-07-12 NOTE — TELEPHONE ENCOUNTER
Patient called and would like to know her 7/9 blood work results. Results are in but not reviewed by the doctor. Please review and call patient accordingly.  Thank you!!

## 2024-07-15 ENCOUNTER — TELEPHONE (OUTPATIENT)
Dept: FAMILY MEDICINE CLINIC | Facility: CLINIC | Age: 59
End: 2024-07-15

## 2024-07-15 ENCOUNTER — E-CONSULT (OUTPATIENT)
Dept: HEMATOLOGY ONCOLOGY | Facility: CLINIC | Age: 59
End: 2024-07-15
Payer: COMMERCIAL

## 2024-07-15 DIAGNOSIS — R93.89 ABNORMAL FINDING ON CT SCAN: Primary | ICD-10-CM

## 2024-07-15 DIAGNOSIS — R93.89 ABNORMAL CT SCAN: Primary | ICD-10-CM

## 2024-07-15 PROCEDURE — 99447 NTRPROF PH1/NTRNET/EHR 11-20: CPT | Performed by: NURSE PRACTITIONER

## 2024-07-15 NOTE — PROGRESS NOTES
E-Consult  Rajani Hillman 59 y.o. female MRN: [id number]  Encounter Date: 07/15/24      Reason for Consult / Principal Problem: Mottled bone appearance on CT     Consulting Provider: HIRAM Tejada    Requesting Provider: Rae King,*       ASSESSMENT:  Patient is a 59-year-old female with history of iron deficiency anemia in the setting of Roque's esophagus and gastric bypass surgery in 2005 history of small bowel obstruction with subsequent small bowel resection 2006.  She follows with [facility] in [place] and has been treated with iron infusions in the past.  She was hospitalized earlier this month with abdominal pain.  CT of abdomen and pelvis completed on 7/3/24 showed findings of some short segment descending colon colitis, possible diverticulitis versus infectious colitis versus questionable ischemia.  She was treated with IV fluids and IV Zosyn for presumed diverticulitis    CT imaging also demonstrated mottled appearance of the bones with scattered lucencies, not previously seen.  Per radiology, this may be metabolic though multiple myeloma is not excluded.  SPEP with immunofixation is negative for monoclonal gammopathy and was interpreted as a pattern consistent with acute phase reaction.  CBC with differential is normal.  No evidence of anemia  CMP demonstrates normal renal function, normal serum calcium  Serum B12 and CRP both elevated indicating inflammatory response  Iron stores within normal range    RECOMMENDATIONS:  Patient was noted to have appearance of mottled bones with scattered lucencies on recent CT imaging.  No crab criteria or monoclonal gammopathy picked up on SPEP which essentially rules out multiple myeloma.  Possible etiology for mottled appearance of bones could be Paget's disease?    Recommend PET CT scan for further evaluation.  If no evidence of FDG uptake is seen, this would be consistent with no active malignancy  If  PET/CT results with abnormal findings, would place consultation for in person evaluation by medical oncology.      Total time spent 11-20 minutes, >50% of the total time devoted to medical consultative verbal/EMR discussion between providers. Written report will be generated in the EMR..

## 2024-07-15 NOTE — TELEPHONE ENCOUNTER
Left message for patient regarding labs. Protein electrophoresis not consistent with multiple myeloma. Heme e-consult.

## 2024-07-15 NOTE — PROGRESS NOTES
E-Consult Consent: Patient aware that a consult is being performed on their behalf to hematology. The patient is aware that they may not see the provider face to face, but the provider may review their medical record and give recommendations. The patient may elect to see the provider in person if requested.

## 2024-07-16 ENCOUNTER — TELEPHONE (OUTPATIENT)
Dept: FAMILY MEDICINE CLINIC | Facility: CLINIC | Age: 59
End: 2024-07-16

## 2024-07-16 DIAGNOSIS — R74.8 ELEVATED VITAMIN B12 LEVEL: Primary | ICD-10-CM

## 2024-07-16 DIAGNOSIS — R93.89 ABNORMAL FINDING ON CT SCAN: ICD-10-CM

## 2024-07-16 DIAGNOSIS — R74.8 ELEVATED LIVER ENZYMES: ICD-10-CM

## 2024-07-16 NOTE — TELEPHONE ENCOUNTER
Discussed lab work--- normal electrophoresis, elevated b12, elevated liver enzymes. Patient will decrease vit b12 supplementation and we will recheck cmp and vit b12 levels in the next 3 months    RECOMMENDATIONS:  Patient was noted to have appearance of mottled bones with scattered lucencies on recent CT imaging.  No crab criteria or monoclonal gammopathy picked up on SPEP which essentially rules out multiple myeloma.  Possible etiology for mottled appearance of bones could be Paget's disease?(No elevation in alk phos)     Discussed hem-onc e-consult: Recommend PET CT scan for further evaluation.  If no evidence of FDG uptake is seen, this would be consistent with no active malignancy  If PET/CT results with abnormal findings, would place consultation for in person evaluation by medical oncology. Patient agreeable to PET.

## 2024-07-16 NOTE — TELEPHONE ENCOUNTER
PA for pantoprazole appealed via     [x]ANGELITO [id number] - PA Case ID: [id number]  []SS  []Letter sent to insurance via fax   []Other site or means     All necessary records sent. Will await response from insurance company    Turnaround time for a decision to be made on an appeal could take up to 30 business days

## 2024-07-16 NOTE — TELEPHONE ENCOUNTER
PA for pantoprazole Denied    Reason:(Screenshot if applicable)        Message sent to office clinical pool Yes    Denial letter scanned into Media Yes

## 2024-07-26 ENCOUNTER — TELEPHONE (OUTPATIENT)
Age: 59
End: 2024-07-26

## 2024-08-09 ENCOUNTER — HOSPITAL ENCOUNTER (OUTPATIENT)
Dept: RADIOLOGY | Age: 59
Discharge: HOME | End: 2024-08-09
Payer: COMMERCIAL

## 2024-08-09 DIAGNOSIS — R93.89 ABNORMAL FINDING ON CT SCAN: ICD-10-CM

## 2024-08-09 LAB — GLUCOSE SERPL-MCNC: 78 MG/DL (ref 65–140)

## 2024-08-09 PROCEDURE — 82948 REAGENT STRIP/BLOOD GLUCOSE: CPT

## 2024-08-09 PROCEDURE — A9552 F18 FDG: HCPCS

## 2024-08-09 PROCEDURE — 78816 PET IMAGE W/CT FULL BODY: CPT

## 2024-08-09 NOTE — NURSING NOTE
PET/CT pre and post instructions reviewed, patient verbalizes understanding.   
DISPLAY PLAN FREE TEXT

## 2024-08-12 ENCOUNTER — HOSPITAL ENCOUNTER (OUTPATIENT)
Dept: BONE DENSITY | Facility: CLINIC | Age: 59
Discharge: HOME | End: 2024-08-12
Payer: COMMERCIAL

## 2024-08-12 ENCOUNTER — HOSPITAL ENCOUNTER (OUTPATIENT)
Dept: MAMMOGRAPHY | Facility: CLINIC | Age: 59
Discharge: HOME | End: 2024-08-12
Payer: COMMERCIAL

## 2024-08-12 VITALS — HEIGHT: 60 IN | BODY MASS INDEX: 26.7 KG/M2 | WEIGHT: 136 LBS

## 2024-08-12 DIAGNOSIS — Z12.31 ENCOUNTER FOR SCREENING MAMMOGRAM FOR BREAST CANCER: ICD-10-CM

## 2024-08-12 DIAGNOSIS — E28.39 OVARIAN FAILURE DUE TO MENOPAUSE: ICD-10-CM

## 2024-08-12 PROCEDURE — 77063 BREAST TOMOSYNTHESIS BI: CPT

## 2024-08-12 PROCEDURE — 77067 SCR MAMMO BI INCL CAD: CPT

## 2024-08-12 PROCEDURE — 77080 DXA BONE DENSITY AXIAL: CPT

## 2024-08-13 ENCOUNTER — TELEPHONE (OUTPATIENT)
Age: 59
End: 2024-08-13

## 2024-08-13 NOTE — TELEPHONE ENCOUNTER
Pt is looking for status of pet scan, dxa, and mammo completed 8/9 & 8/12, respectively; advised still with Rae for review. Pt asked if I was able to let her know if she was cancer free and I advised pt until Rae leaves her notes no one would be able to confirm anything. Pt aware.    Please contact to advise once resulted by provider.

## 2024-08-16 DIAGNOSIS — M81.0 AGE RELATED OSTEOPOROSIS, UNSPECIFIED PATHOLOGICAL FRACTURE PRESENCE: Primary | ICD-10-CM

## 2024-08-16 RX ORDER — ALENDRONATE SODIUM 70 MG/1
70 TABLET ORAL
Qty: 12 TABLET | Refills: 3 | Status: SHIPPED | OUTPATIENT
Start: 2024-08-16

## 2024-08-23 ENCOUNTER — TELEPHONE (OUTPATIENT)
Dept: GASTROENTEROLOGY | Facility: CLINIC | Age: 59
End: 2024-08-23

## 2024-08-26 ENCOUNTER — ANESTHESIA (OUTPATIENT)
Dept: ANESTHESIOLOGY | Facility: AMBULATORY SURGERY CENTER | Age: 59
End: 2024-08-26

## 2024-08-26 ENCOUNTER — ANESTHESIA EVENT (OUTPATIENT)
Dept: ANESTHESIOLOGY | Facility: AMBULATORY SURGERY CENTER | Age: 59
End: 2024-08-26

## 2024-09-04 ENCOUNTER — OFFICE VISIT (OUTPATIENT)
Dept: FAMILY MEDICINE CLINIC | Facility: CLINIC | Age: 59
End: 2024-09-04
Payer: COMMERCIAL

## 2024-09-04 VITALS
WEIGHT: 136 LBS | OXYGEN SATURATION: 99 % | HEART RATE: 72 BPM | DIASTOLIC BLOOD PRESSURE: 80 MMHG | SYSTOLIC BLOOD PRESSURE: 100 MMHG | BODY MASS INDEX: 26.56 KG/M2

## 2024-09-04 DIAGNOSIS — M25.50 ARTHRALGIA, UNSPECIFIED JOINT: Primary | ICD-10-CM

## 2024-09-04 PROCEDURE — 3725F SCREEN DEPRESSION PERFORMED: CPT | Performed by: FAMILY MEDICINE

## 2024-09-04 PROCEDURE — 99214 OFFICE O/P EST MOD 30 MIN: CPT | Performed by: FAMILY MEDICINE

## 2024-09-04 RX ORDER — PREDNISONE 20 MG/1
TABLET ORAL
Qty: 15 TABLET | Refills: 1 | Status: SHIPPED | OUTPATIENT
Start: 2024-09-04

## 2024-09-04 NOTE — PROGRESS NOTES
Assessment/Plan:    No problem-specific Assessment & Plan notes found for this encounter.       There are no diagnoses linked to this encounter.      Subjective:   Chief Complaint   Patient presents with    Leg Swelling     Pt had a vitamin infusion on Sunday and woke up with swollen legs Monday morning        Patient ID: Rajani Hillman is a 59 y.o. female.    HPI    The following portions of the patient's history were reviewed and updated as appropriate: allergies, current medications, past family history, past medical history, past social history, past surgical history and problem list.    Review of Systems   Constitutional:  Negative for fatigue, fever and unexpected weight change.   HENT:  Negative for congestion, sinus pain and sore throat.    Eyes:  Negative for visual disturbance.   Respiratory:  Negative for shortness of breath and wheezing.    Cardiovascular:  Negative for chest pain and palpitations.   Gastrointestinal:  Negative for abdominal pain, nausea and vomiting.   Musculoskeletal: Negative.  Negative for arthralgias and myalgias.   Neurological:  Negative for syncope, weakness and numbness.   Psychiatric/Behavioral: Negative.  Negative for confusion, dysphoric mood and suicidal ideas.          Objective:  Vitals:    09/04/24 1358   BP: 100/80   BP Location: Left arm   Patient Position: Sitting   Cuff Size: Standard   Pulse: 72   SpO2: 99%   Weight: 61.7 kg (136 lb)      Physical Exam  Constitutional:       Appearance: She is well-developed.   HENT:      Right Ear: Ear canal normal. Tympanic membrane is not injected.      Left Ear: Ear canal normal. Tympanic membrane is not injected.      Nose: Nose normal.   Eyes:      General:         Right eye: No discharge.         Left eye: No discharge.      Conjunctiva/sclera: Conjunctivae normal.      Pupils: Pupils are equal, round, and reactive to light.   Neck:      Thyroid: No thyromegaly.   Cardiovascular:      Rate and Rhythm: Normal rate and regular  rhythm.      Heart sounds: Normal heart sounds. No murmur heard.  Pulmonary:      Effort: Pulmonary effort is normal. No respiratory distress.      Breath sounds: Normal breath sounds. No wheezing.   Abdominal:      General: Bowel sounds are normal. There is no distension.      Palpations: Abdomen is soft.      Tenderness: There is no abdominal tenderness.   Musculoskeletal:         General: Normal range of motion.      Cervical back: Normal range of motion and neck supple.   Lymphadenopathy:      Cervical: No cervical adenopathy.   Skin:     General: Skin is warm and dry.   Neurological:      Mental Status: She is alert and oriented to person, place, and time. She is not disoriented.      Sensory: No sensory deficit.      Motor: No weakness.      Coordination: Coordination normal.      Gait: Gait normal.      Deep Tendon Reflexes: Reflexes are normal and symmetric.   Psychiatric:         Speech: Speech normal.         Behavior: Behavior normal.         Thought Content: Thought content normal.         Judgment: Judgment normal.

## 2024-09-05 ENCOUNTER — TELEPHONE (OUTPATIENT)
Dept: GASTROENTEROLOGY | Facility: AMBULATORY SURGERY CENTER | Age: 59
End: 2024-09-05

## 2024-09-09 ENCOUNTER — ANESTHESIA (OUTPATIENT)
Dept: GASTROENTEROLOGY | Facility: AMBULATORY SURGERY CENTER | Age: 59
End: 2024-09-09

## 2024-09-09 ENCOUNTER — ANESTHESIA EVENT (OUTPATIENT)
Dept: GASTROENTEROLOGY | Facility: AMBULATORY SURGERY CENTER | Age: 59
End: 2024-09-09

## 2024-09-09 ENCOUNTER — HOSPITAL ENCOUNTER (OUTPATIENT)
Dept: GASTROENTEROLOGY | Facility: AMBULATORY SURGERY CENTER | Age: 59
Discharge: HOME | End: 2024-09-09
Payer: COMMERCIAL

## 2024-09-09 VITALS
SYSTOLIC BLOOD PRESSURE: 100 MMHG | OXYGEN SATURATION: 100 % | BODY MASS INDEX: 26.9 KG/M2 | DIASTOLIC BLOOD PRESSURE: 66 MMHG | HEIGHT: 60 IN | RESPIRATION RATE: 18 BRPM | HEART RATE: 66 BPM | TEMPERATURE: 97.6 F | WEIGHT: 137 LBS

## 2024-09-09 DIAGNOSIS — K57.90 DIVERTICULAR DISEASE: ICD-10-CM

## 2024-09-09 DIAGNOSIS — K22.70 BARRETT'S ESOPHAGUS WITHOUT DYSPLASIA: ICD-10-CM

## 2024-09-09 PROCEDURE — 45378 DIAGNOSTIC COLONOSCOPY: CPT | Performed by: INTERNAL MEDICINE

## 2024-09-09 PROCEDURE — 43239 EGD BIOPSY SINGLE/MULTIPLE: CPT | Performed by: INTERNAL MEDICINE

## 2024-09-09 PROCEDURE — 88305 TISSUE EXAM BY PATHOLOGIST: CPT | Performed by: PATHOLOGY

## 2024-09-09 RX ORDER — SODIUM CHLORIDE, SODIUM LACTATE, POTASSIUM CHLORIDE, CALCIUM CHLORIDE 600; 310; 30; 20 MG/100ML; MG/100ML; MG/100ML; MG/100ML
50 INJECTION, SOLUTION INTRAVENOUS CONTINUOUS
Status: DISCONTINUED | OUTPATIENT
Start: 2024-09-09 | End: 2024-09-09

## 2024-09-09 RX ORDER — PROPOFOL 10 MG/ML
INJECTION, EMULSION INTRAVENOUS AS NEEDED
Status: DISCONTINUED | OUTPATIENT
Start: 2024-09-09 | End: 2024-09-09

## 2024-09-09 RX ORDER — LIDOCAINE HYDROCHLORIDE 10 MG/ML
INJECTION, SOLUTION EPIDURAL; INFILTRATION; INTRACAUDAL; PERINEURAL AS NEEDED
Status: DISCONTINUED | OUTPATIENT
Start: 2024-09-09 | End: 2024-09-09

## 2024-09-09 RX ORDER — PROPOFOL 10 MG/ML
INJECTION, EMULSION INTRAVENOUS CONTINUOUS PRN
Status: DISCONTINUED | OUTPATIENT
Start: 2024-09-09 | End: 2024-09-09

## 2024-09-09 RX ADMIN — PROPOFOL 110 MG: 10 INJECTION, EMULSION INTRAVENOUS at 09:18

## 2024-09-09 RX ADMIN — PROPOFOL 120 MCG/KG/MIN: 10 INJECTION, EMULSION INTRAVENOUS at 09:18

## 2024-09-09 RX ADMIN — LIDOCAINE HYDROCHLORIDE 100 MG: 10 INJECTION, SOLUTION EPIDURAL; INFILTRATION; INTRACAUDAL; PERINEURAL at 09:18

## 2024-09-09 RX ADMIN — SODIUM CHLORIDE, SODIUM LACTATE, POTASSIUM CHLORIDE, CALCIUM CHLORIDE 50 ML/HR: 600; 310; 30; 20 INJECTION, SOLUTION INTRAVENOUS at 09:04

## 2024-09-09 NOTE — ANESTHESIA POSTPROCEDURE EVALUATION
Post-Op Assessment Note    CV Status:  Stable  Pain Score: 0    Pain management: adequate       Mental Status:  Alert and awake   Hydration Status:  Euvolemic   PONV Controlled:  Controlled   Airway Patency:  Patent     Post Op Vitals Reviewed: Yes    No anethesia notable event occurred.    Staff: CRNA               BP   90/54   Temp      Pulse   68   Resp   15   SpO2   98%

## 2024-09-09 NOTE — H&P
History and Physical - [facility]  Rajani Hillman 59 y.o. female MRN: [id number]    HPI: Rajani Hillman is a 59 y.o. year old female who presents for EGD and colonoscopy.  She has a history of Roque's esophagus.  Also recent history of the summer with diverticulitis    REVIEW OF SYSTEMS: Per the HPI, and otherwise unremarkable.    Historical Information   Past Medical History:   Diagnosis Date    Roque's esophagus     Low iron      Past Surgical History:   Procedure Laterality Date    CHOLECYSTECTOMY      COLON SURGERY      COLONOSCOPY      GASTRIC BYPASS      GASTROSTOMY      HERNIA REPAIR      TUBAL LIGATION       Social History   Social History     Substance and Sexual Activity   Alcohol Use Not Currently     Social History     Substance and Sexual Activity   Drug Use Not Currently     Social History     Tobacco Use   Smoking Status Never   Smokeless Tobacco Never     Family History   Problem Relation Age of Onset    Heart disease Mother     Diabetes Sister     Thyroid disease Sister     Heart disease Maternal Grandmother     Diabetes Maternal Grandmother     Stroke Maternal Grandmother     ALS Maternal Grandfather     Thyroid cancer Son     ALS Maternal Aunt     ALS Cousin     Colon cancer Neg Hx     Colon polyps Neg Hx        Meds/Allergies       Current Outpatient Medications:     Multiple Vitamin tablet    pantoprazole (PROTONIX) 20 mg tablet    predniSONE 20 mg tablet    alendronate (Fosamax) 70 mg tablet    polyethylene glycol (MiraLax) 17 GM/SCOOP powder    vitamin B-12 (CYANOCOBALAMIN) 50 MCG tablet    Current Facility-Administered Medications:     lactated ringers infusion, 50 mL/hr, Intravenous, Continuous    No Known Allergies    Objective     /68   Pulse 58   Temp 97.6 °F (36.4 °C) (Temporal)   Resp (!) 11   Ht 5' (1.524 m)   Wt 62.1 kg (137 lb)   SpO2 97%   BMI 26.76 kg/m²     PHYSICAL EXAM    Gen: NAD AAOx3  Head: Normocephalic, Atraumatic  CV: S1S2 RRR no  m/r/g  CHEST: Clear b/l no c/r/w  ABD: soft, +BS NT/ND  EXT: no edema    ASSESSMENT/PLAN:  This is a 59 y.o. year old female here for EGD and colonoscopy, and she is stable and optimized for her procedure.

## 2024-09-09 NOTE — ANESTHESIA PREPROCEDURE EVALUATION
Procedure:  COLONOSCOPY  EGD    Relevant Problems   No relevant active problems        Roque's esophagus/GERD  osteoporosis  Gastric bypass  Iron deficiency  Physical Exam    Airway    Mallampati score: II  TM Distance: <3 FB  Neck ROM: full     Dental   Comment: None loose, No notable dental hx     Cardiovascular      Pulmonary      Other Findings  post-pubertal.      Anesthesia Plan  ASA Score- 1     Anesthesia Type- IV sedation with anesthesia with ASA Monitors.         Additional Monitors:     Airway Plan:     Comment: Last of PO bowel prep: 03:00    Patient educated on the possibility for awareness under sedation and of the possibility of airway intervention in the event of an airway or procedural emergency  .       Plan Factors-Exercise tolerance (METS): >4 METS.    Chart reviewed.    Patient summary reviewed.    Patient is not a current smoker.              Induction- intravenous.    Postoperative Plan-         Informed Consent- Anesthetic plan and risks discussed with patient.  I personally reviewed this patient with the CRNA. Discussed and agreed on the Anesthesia Plan with the CRNA..

## 2024-09-10 LAB — B BURGDOR IGG+IGM SER QL IA: <=0.9 INDEX

## 2024-09-12 PROCEDURE — 88305 TISSUE EXAM BY PATHOLOGIST: CPT | Performed by: PATHOLOGY

## 2024-09-14 NOTE — RESULT ENCOUNTER NOTE
Pathology reviewed.  Left a message in [email or web address] for the patient.  She did review results.  Positive for Roque's esophagus.  No dysplasia.  WATS still pending.  Recall exam for 5 years unless WATS indicates sooner interval follow-up.

## 2024-09-16 ENCOUNTER — TRANSCRIBE ORDERS (OUTPATIENT)
Dept: GASTROENTEROLOGY | Facility: CLINIC | Age: 59
End: 2024-09-16

## 2024-10-05 ENCOUNTER — NURSE TRIAGE (OUTPATIENT)
Dept: OTHER | Facility: OTHER | Age: 59
End: 2024-10-05

## 2024-10-05 DIAGNOSIS — U07.1 COVID: Primary | ICD-10-CM

## 2024-10-05 RX ORDER — NIRMATRELVIR AND RITONAVIR 300-100 MG
3 KIT ORAL 2 TIMES DAILY
Qty: 30 TABLET | Refills: 0 | Status: SHIPPED | OUTPATIENT
Start: 2024-10-05 | End: 2024-10-10

## 2024-10-05 NOTE — TELEPHONE ENCOUNTER
Per on call-  Script sent to pharmacy as requested. Patient can also continue day/nyquil      Patient was advised and verbalized understanding.

## 2024-10-05 NOTE — TELEPHONE ENCOUNTER
"Regarding: covid positive/fever/fatigue/medication questions  ----- Message from Suma SNEED sent at 10/5/2024  5:22 PM EDT -----  Pt stated, \"I tested positive for covid. I have a cough, headache, fever and fatigue. I would like to speak to the nurse about medications.\"    "

## 2024-10-05 NOTE — TELEPHONE ENCOUNTER
started yesterday with a scratchy throat. She notes today she took a covid test and it was positive. Patient also complaining of a deep, loose cough, extreme fatigue, and fever. Denies any chest pain or SOB. Also denies any significant medical history. She is questioning if paxlovid should be prescribed? If so can she take nyquil along with it?     On all provider notified   Reason for Disposition   [1] COVID-19 diagnosed by positive lab test (e.g., PCR, rapid self-test kit) AND [2] mild symptoms (e.g., cough, fever, others) AND [3] no complications or SOB    Answer Assessment - Initial Assessment Questions  Were you within 6 feet or less, for up to 15 minutes or more with a person that has a confirmed COVID-19 test? unsure  What was the date of your exposure? unsure  Are you experiencing any symptoms attributed to the virus?  (Assess for SOB, cough, fever, difficulty breathing) cough, fatigue, fever  HIGH RISK: Do you have any history heart or lung conditions, weakened immune system, diabetes, Asthma, CHF, HIV, COPD, Chemo, renal failure, sickle cell, etc? denies    Protocols used: Coronavirus (COVID-19) Diagnosed or Suspected-ADULT-

## 2024-10-08 ENCOUNTER — TELEPHONE (OUTPATIENT)
Age: 59
End: 2024-10-08

## 2024-10-08 NOTE — TELEPHONE ENCOUNTER
[employer] will fax over paper work for the patient to be filled out By Rae. Rae prescribed her Plaxovid on 10/5.  Patient was out of work on Saturday and  Monday & will be out on Wednesday as well.  She needs the paper work filled for [employer]. They will be faxing over the paper work to be filled by Rae.  Please reach out to the patient with any questions.    Thank you!!

## 2024-10-09 NOTE — TELEPHONE ENCOUNTER
Received paperwork from [employer]. Did not know answers to the questions. Wanted appointment with Rae. Scheduled for tomorrow.

## 2024-10-10 ENCOUNTER — OFFICE VISIT (OUTPATIENT)
Dept: FAMILY MEDICINE CLINIC | Facility: CLINIC | Age: 59
End: 2024-10-10
Payer: COMMERCIAL

## 2024-10-10 VITALS
BODY MASS INDEX: 26.37 KG/M2 | OXYGEN SATURATION: 98 % | TEMPERATURE: 95 F | WEIGHT: 135 LBS | SYSTOLIC BLOOD PRESSURE: 126 MMHG | HEART RATE: 78 BPM | DIASTOLIC BLOOD PRESSURE: 72 MMHG

## 2024-10-10 DIAGNOSIS — T78.40XA ALLERGIC REACTION, INITIAL ENCOUNTER: ICD-10-CM

## 2024-10-10 DIAGNOSIS — U07.1 COVID: Primary | ICD-10-CM

## 2024-10-10 DIAGNOSIS — M25.50 ARTHRALGIA, UNSPECIFIED JOINT: ICD-10-CM

## 2024-10-10 DIAGNOSIS — R53.82 CHRONIC FATIGUE: ICD-10-CM

## 2024-10-10 PROCEDURE — 99214 OFFICE O/P EST MOD 30 MIN: CPT

## 2024-10-10 RX ORDER — BENZONATATE 200 MG/1
200 CAPSULE ORAL 3 TIMES DAILY PRN
Qty: 20 CAPSULE | Refills: 2 | Status: SHIPPED | OUTPATIENT
Start: 2024-10-10

## 2024-10-10 NOTE — PROGRESS NOTES
"Ambulatory Visit  Name: Rajani Hillman      : 1965      MRN: [id number]  Encounter Provider: HIRAM Queen  Encounter Date: 10/10/2024   Encounter department: [facility]    Assessment & Plan  COVID    Orders:    benzonatate (TESSALON) 200 MG capsule; Take 1 capsule (200 mg total) by mouth 3 (three) times a day as needed for cough    Allergic reaction, initial encounter  Patient reports an allergic reaction that cause bilateral knee swelling after rec'ing a vitamin infusion. Patient reports an intolerance to vitamin c. Patient reports an intolerance to foods as well. Would like to follow up with allergy.   Orders:    Ambulatory Referral to Allergy; Future    Chronic fatigue  Patient reports continued fatigue and arthralgias. States, \"I think maybe I should see rheum. I just want to find out why I feel like crap all the time.\" Ref rheum.   Orders:    Ambulatory Referral to Rheumatology; Future    Arthralgia, unspecified joint    Orders:    Ambulatory Referral to Rheumatology; Future      Depression Screening and Follow-up Plan: Patient was screened for depression during today's encounter. They screened negative with a PHQ-2 score of 0.      History of Present Illness     URI   This is a new problem. The current episode started in the past 7 days. The problem has been gradually improving. Associated symptoms include congestion, coughing, diarrhea, joint pain, nausea, a sore throat and vomiting. Pertinent negatives include no abdominal pain, chest pain, ear pain, rash, rhinorrhea or wheezing. Treatments tried: paxlovid.       History obtained from : patient  Review of Systems   Constitutional:  Positive for fatigue. Negative for activity change.   HENT:  Positive for congestion and sore throat. Negative for ear pain and rhinorrhea.    Eyes:  Negative for pain.   Respiratory:  Positive for cough. Negative for shortness of breath and wheezing.    Cardiovascular:  " Negative for chest pain and leg swelling.   Gastrointestinal:  Positive for diarrhea, nausea and vomiting. Negative for abdominal pain.   Musculoskeletal:  Positive for arthralgias and joint pain. Negative for myalgias.   Skin:  Negative for rash.   Neurological:  Negative for dizziness, weakness and numbness.   All other systems reviewed and are negative.    Medical History Reviewed by provider this encounter:  Tobacco  Allergies  Meds  Problems  Med Hx  Surg Hx  Fam Hx       Current Outpatient Medications on File Prior to Visit   Medication Sig Dispense Refill    Multiple Vitamin tablet Take by mouth      nirmatrelvir & ritonavir (Paxlovid, 300/100,) tablet therapy pack Take 3 tablets by mouth 2 (two) times a day for 5 days Take 2 nirmatrelvir tablets + 1 ritonavir tablet together per dose 30 tablet 0    pantoprazole (PROTONIX) 20 mg tablet Take 1 tablet (20 mg total) by mouth daily 30 tablet 4    [DISCONTINUED] alendronate (Fosamax) 70 mg tablet Take 1 tablet (70 mg total) by mouth every 7 days (Patient not taking: Reported on 10/10/2024) 12 tablet 3    [DISCONTINUED] predniSONE 20 mg tablet TAKE 1 TABLET BID X 5 DAYS THEN TAKE 1 TABLET QD FOR 5 DAYS (Patient not taking: Reported on 10/10/2024) 15 tablet 1     No current facility-administered medications on file prior to visit.          Objective     /72 (BP Location: Left arm, Patient Position: Sitting, Cuff Size: Standard)   Pulse 78   Temp (!) 95 °F (35 °C) (Tympanic)   Wt 61.2 kg (135 lb)   SpO2 98%   BMI 26.37 kg/m²     Physical Exam  Vitals and nursing note reviewed.   Constitutional:       General: She is not in acute distress.     Appearance: Normal appearance. She is well-developed. She is not ill-appearing.   HENT:      Head: Normocephalic and atraumatic.      Right Ear: Tympanic membrane, ear canal and external ear normal. There is no impacted cerumen.      Left Ear: Tympanic membrane, ear canal and external ear normal. There is no  impacted cerumen.      Nose: Nose normal.      Mouth/Throat:      Mouth: Mucous membranes are moist.      Pharynx: Posterior oropharyngeal erythema present. No oropharyngeal exudate.   Eyes:      Conjunctiva/sclera: Conjunctivae normal.   Cardiovascular:      Rate and Rhythm: Normal rate and regular rhythm.      Heart sounds: Normal heart sounds. No murmur heard.  Pulmonary:      Effort: Pulmonary effort is normal. No respiratory distress.      Breath sounds: Normal breath sounds.   Abdominal:      General: Bowel sounds are normal. There is no distension.      Palpations: Abdomen is soft.      Tenderness: There is no abdominal tenderness.   Musculoskeletal:      Cervical back: Neck supple.   Skin:     General: Skin is warm and dry.      Capillary Refill: Capillary refill takes less than 2 seconds.   Neurological:      Mental Status: She is alert and oriented to person, place, and time. Mental status is at baseline.   Psychiatric:         Mood and Affect: Mood normal.         Behavior: Behavior normal.       Administrative Statements   I have spent a total time of 20 minutes in caring for this patient on the day of the visit/encounter including Risks and benefits of tx options, Instructions for management, Patient and family education, Importance of tx compliance, Risk factor reductions, Impressions, Documenting in the medical record, Reviewing / ordering tests, medicine, procedures  , and Obtaining or reviewing history  .

## 2024-10-21 ENCOUNTER — CONSULT (OUTPATIENT)
Age: 59
End: 2024-10-21
Payer: COMMERCIAL

## 2024-10-21 VITALS
OXYGEN SATURATION: 96 % | DIASTOLIC BLOOD PRESSURE: 84 MMHG | TEMPERATURE: 98.2 F | BODY MASS INDEX: 26.5 KG/M2 | WEIGHT: 135 LBS | SYSTOLIC BLOOD PRESSURE: 128 MMHG | HEART RATE: 81 BPM | HEIGHT: 60 IN

## 2024-10-21 DIAGNOSIS — M25.50 ARTHRALGIA, UNSPECIFIED JOINT: ICD-10-CM

## 2024-10-21 DIAGNOSIS — M81.0 AGE-RELATED OSTEOPOROSIS WITHOUT CURRENT PATHOLOGICAL FRACTURE: Primary | ICD-10-CM

## 2024-10-21 DIAGNOSIS — R53.82 CHRONIC FATIGUE: ICD-10-CM

## 2024-10-21 PROCEDURE — 99244 OFF/OP CNSLTJ NEW/EST MOD 40: CPT | Performed by: INTERNAL MEDICINE

## 2024-10-21 NOTE — PATIENT INSTRUCTIONS
Please do blood work as ordered to evaluate for your fatigue  Also please get X-rays of your right hip and knee  Meanwhile, you can check the resources that I have attached for Osteoporosis including treatment options of Evenity (we will submit prior auth for it and you can receive at [place])

## 2024-10-22 ENCOUNTER — TELEPHONE (OUTPATIENT)
Age: 59
End: 2024-10-22

## 2024-10-22 NOTE — TELEPHONE ENCOUNTER
Medication/Disease State Information:   Diagnosis: Severe osteoporosis  Medication: Evenity (romosozumab) 210 monthly SC injections for 12 months  Screening  Vitamin D level: Pending  Calcium: Normal     Site of medication administration (if applicable): SC injection     Major Negro MD  NPI: [id number]

## 2024-10-22 NOTE — PROGRESS NOTES
Ambulatory Visit  Name: Rajani Hillman      : 1965      MRN: [id number]  Encounter Provider: Major Negro MD  Encounter Date: 10/21/2024   Encounter department: [facility]    Assessment & Plan  Chronic fatigue  Reports chronic fatigue for the last 1-2 years. Denies any arthralgias or any specific features typical of a rheumatological disease. Patient do get iron infusions for low iron but her recent levels were normal. Would evaluate other common causes for fatigue and re-evaluate her symptoms in the next visit    Orders:    Ambulatory Referral to Rheumatology    TSH, 3rd generation with Free T4 reflex; Future    Vitamin D 25 hydroxy; Future    Iron; Future    Ferritin; Future    TSH, 3rd generation with Free T4 reflex    Vitamin D 25 hydroxy    Iron    Ferritin    Arthralgia, unspecified joint  Rajani Hillman is a 59 y.o. female with history of Rqoue's esophagus and gastric bypass who presents as a referral for fatigue. She has fatigue for the last 1-2 years. But she denies any arthralgias except for right hip and knee that worsens with climbing stairs. Her morning stiffness improves immediately. The pain worsens towards end of the day and gets better in the morning, which is not consistent with inflammatory arthritis and appears more related to degenerative changes, so would obtain baseline X-rays to evaluate.    Orders:    Ambulatory Referral to Rheumatology    XR hip/pelv 2-3 vws right if performed; Future    XR knee 3 vw right non injury; Future    Age-related osteoporosis without current pathological fracture  Recent DEXA scan in 2023 showed T-score of -3.3 in her Lumbar spine consistent with osteoporosis. She was started on Fosamax but stopped taking it as it did make her feel weak. Denies hx any fragility fractures. Will check vit D levels and will submit prior auth to her insurance for romosozumab         History of Present Illness     Rajani Hillman is  a 59 y.o. female with history of Roque's esophagus and gastric bypass who presents as a referral for fatigue. She has fatigue for the last 1-2 years. But she denies any arthralgias except for right hip and knee that worsens with climbing stairs. Her morning stiffness improves immediately. The pain worsens towards end of the day and gets better in the morning.     History obtained from : patient      Review of Systems  Denies:  Fever  Rash  Oral/nasal/genital ulcers  Muscle weakness  Uveitis  Dactylitis  Dysphagia/odynophagia  CP  NORMAN  SOB at rest  Pleurisy  Stomach pain  Constipation/bloating  Hematochezia  Gross hematuria  Numbness/tingling  Raynaud's  Joint issues other than noted above     Pertinent Medical History       Medical History Reviewed by provider this encounter:       Past Medical History   Past Medical History:   Diagnosis Date    Roque's esophagus     Low iron      Past Surgical History:   Procedure Laterality Date    CHOLECYSTECTOMY      COLON SURGERY      COLONOSCOPY      GASTRIC BYPASS      GASTROSTOMY      HERNIA REPAIR      TUBAL LIGATION       Family History   Problem Relation Age of Onset    Heart disease Mother     Diabetes Sister     Thyroid disease Sister     Heart disease Maternal Grandmother     Diabetes Maternal Grandmother     Stroke Maternal Grandmother     ALS Maternal Grandfather     Thyroid cancer Son     ALS Maternal Aunt     ALS Cousin     Colon cancer Neg Hx     Colon polyps Neg Hx      Current Outpatient Medications on File Prior to Visit   Medication Sig Dispense Refill    Multiple Vitamin tablet Take by mouth      pantoprazole (PROTONIX) 20 mg tablet Take 1 tablet (20 mg total) by mouth daily 30 tablet 4    benzonatate (TESSALON) 200 MG capsule Take 1 capsule (200 mg total) by mouth 3 (three) times a day as needed for cough 20 capsule 2     No current facility-administered medications on file prior to visit.   No Known Allergies   Current Outpatient Medications on File  Prior to Visit   Medication Sig Dispense Refill    Multiple Vitamin tablet Take by mouth      pantoprazole (PROTONIX) 20 mg tablet Take 1 tablet (20 mg total) by mouth daily 30 tablet 4    benzonatate (TESSALON) 200 MG capsule Take 1 capsule (200 mg total) by mouth 3 (three) times a day as needed for cough 20 capsule 2     No current facility-administered medications on file prior to visit.      Social History     Tobacco Use    Smoking status: Never    Smokeless tobacco: Never   Vaping Use    Vaping status: Never Used   Substance and Sexual Activity    Alcohol use: Not Currently    Drug use: Not Currently    Sexual activity: Not on file         Objective     /84   Pulse 81   Temp 98.2 °F (36.8 °C) (Tympanic)   Ht 5' (1.524 m)   Wt 61.2 kg (135 lb)   SpO2 96%   BMI 26.37 kg/m²     Physical Exam  Constitutional:       Appearance: Normal appearance.   HENT:      Head: Normocephalic and atraumatic.   Musculoskeletal:      Comments: No swelling, tenderness, erythema, deformities of her bilateral wrists, MCPs, PIPs and DIPs  No swelling, tenderness of her bilateral shoulders, hips, knees, elbows, ankles and feet  ROM is normal in all of her joints     Skin:     Findings: No rash.   Neurological:      Mental Status: She is alert and oriented to person, place, and time.

## 2024-10-25 LAB
ALBUMIN SERPL-MCNC: 3.8 G/DL (ref 3.6–5.1)
ALBUMIN/GLOB SERPL: 1.8 (CALC) (ref 1–2.5)
ALP SERPL-CCNC: 93 U/L (ref 37–153)
ALT SERPL-CCNC: 22 U/L (ref 6–29)
APPEARANCE UR: CLEAR
AST SERPL-CCNC: 22 U/L (ref 10–35)
BACTERIA UR QL AUTO: NORMAL /HPF
BILIRUB SERPL-MCNC: 0.3 MG/DL (ref 0.2–1.2)
BILIRUB UR QL STRIP: NEGATIVE
BUN SERPL-MCNC: 23 MG/DL (ref 7–25)
BUN/CREAT SERPL: ABNORMAL (CALC) (ref 6–22)
CALCIUM SERPL-MCNC: 8.6 MG/DL (ref 8.6–10.4)
CHLORIDE SERPL-SCNC: 109 MMOL/L (ref 98–110)
CO2 SERPL-SCNC: 23 MMOL/L (ref 20–32)
COLOR UR: YELLOW
CREAT SERPL-MCNC: 1 MG/DL (ref 0.5–1.05)
FERRITIN SERPL-MCNC: 104 NG/ML (ref 16–288)
GFR/BSA.PRED SERPLBLD CYS-BASED-ARV: 64 ML/MIN/1.73M2
GLOBULIN SER CALC-MCNC: 2.1 G/DL (CALC) (ref 1.9–3.7)
GLUCOSE SERPL-MCNC: 95 MG/DL (ref 65–99)
GLUCOSE UR QL STRIP: NEGATIVE
HGB UR QL STRIP: NEGATIVE
HYALINE CASTS #/AREA URNS LPF: NORMAL /LPF
IRON SATN MFR SERPL: 25 % (CALC) (ref 16–45)
IRON SERPL-MCNC: 79 MCG/DL (ref 45–160)
KETONES UR QL STRIP: NEGATIVE
LEUKOCYTE ESTERASE UR QL STRIP: NEGATIVE
NITRITE UR QL STRIP: NEGATIVE
PH UR STRIP: NORMAL [PH] (ref 5–8)
POTASSIUM SERPL-SCNC: 4.2 MMOL/L (ref 3.5–5.3)
PROT SERPL-MCNC: 5.9 G/DL (ref 6.1–8.1)
PROT UR QL STRIP: NEGATIVE
RBC #/AREA URNS HPF: NORMAL /HPF
SODIUM SERPL-SCNC: 140 MMOL/L (ref 135–146)
SP GR UR STRIP: 1.01 (ref 1–1.03)
SQUAMOUS #/AREA URNS HPF: NORMAL /HPF
TIBC SERPL-MCNC: 320 MCG/DL (CALC) (ref 250–450)
VIT B12 SERPL-MCNC: 936 PG/ML (ref 200–1100)
WBC #/AREA URNS HPF: NORMAL /HPF

## 2024-10-25 NOTE — TELEPHONE ENCOUNTER
Of note, patient wants to get the Evenity injections at the [facility] if possible.

## 2024-10-27 PROBLEM — M81.0 AGE-RELATED OSTEOPOROSIS WITHOUT CURRENT PATHOLOGICAL FRACTURE: Status: ACTIVE | Noted: 2024-10-27

## 2024-10-28 DIAGNOSIS — M81.0 AGE-RELATED OSTEOPOROSIS WITHOUT CURRENT PATHOLOGICAL FRACTURE: Primary | ICD-10-CM

## 2024-10-29 NOTE — TELEPHONE ENCOUNTER
Called pt and LM to return call. Back of insurance card is not scanned in and need the information in order to start auth and see if she has OOP repsonibility. Gave her my direct line to return call. [phone]

## 2024-10-30 NOTE — TELEPHONE ENCOUNTER
Called number that was provided and spoke with Julia and she transferred me over to [facility] [phone] option 2.     Jamshid (reference number:[id number] ) gave the benefits for  as below   1/1/2024-12/31/2024     Pt has a co-insurance of 25% once her OOP is met   Deductible: MET   OOP: MET   Plan covers 100% until term date.     Pre-certification is required. Jamshid faxed me the form and advised me to attach clinicals with it.

## 2024-10-30 NOTE — TELEPHONE ENCOUNTER
Pt called and gave me the number on the back of the card. [phone]. She also would like more information on the evenity injections. Sent her via [employer] website.

## 2024-11-15 ENCOUNTER — OFFICE VISIT (OUTPATIENT)
Dept: URGENT CARE | Facility: CLINIC | Age: 59
End: 2024-11-15
Payer: COMMERCIAL

## 2024-11-15 ENCOUNTER — HOSPITAL ENCOUNTER (EMERGENCY)
Facility: HOSPITAL | Age: 59
Discharge: HOME | End: 2024-11-15
Attending: EMERGENCY MEDICINE
Payer: COMMERCIAL

## 2024-11-15 ENCOUNTER — APPOINTMENT (EMERGENCY)
Dept: NON INVASIVE DIAGNOSTICS | Facility: HOSPITAL | Age: 59
End: 2024-11-15
Payer: COMMERCIAL

## 2024-11-15 ENCOUNTER — TELEPHONE (OUTPATIENT)
Age: 59
End: 2024-11-15

## 2024-11-15 ENCOUNTER — APPOINTMENT (EMERGENCY)
Dept: RADIOLOGY | Facility: HOSPITAL | Age: 59
End: 2024-11-15
Payer: COMMERCIAL

## 2024-11-15 ENCOUNTER — TRANSCRIBE ORDERS (OUTPATIENT)
Age: 59
End: 2024-11-15

## 2024-11-15 VITALS
OXYGEN SATURATION: 98 % | DIASTOLIC BLOOD PRESSURE: 84 MMHG | HEART RATE: 75 BPM | RESPIRATION RATE: 18 BRPM | TEMPERATURE: 97.8 F | SYSTOLIC BLOOD PRESSURE: 120 MMHG

## 2024-11-15 VITALS
HEART RATE: 77 BPM | OXYGEN SATURATION: 98 % | RESPIRATION RATE: 18 BRPM | SYSTOLIC BLOOD PRESSURE: 117 MMHG | TEMPERATURE: 97.4 F | DIASTOLIC BLOOD PRESSURE: 73 MMHG

## 2024-11-15 DIAGNOSIS — M17.11 ARTHRITIS OF RIGHT KNEE: ICD-10-CM

## 2024-11-15 DIAGNOSIS — R60.0 LOCALIZED EDEMA: Primary | ICD-10-CM

## 2024-11-15 DIAGNOSIS — S83.91XA SPRAIN OF RIGHT KNEE, UNSPECIFIED LIGAMENT, INITIAL ENCOUNTER: ICD-10-CM

## 2024-11-15 DIAGNOSIS — R22.43 LOCALIZED SWELLING OF BOTH LOWER LEGS: Primary | ICD-10-CM

## 2024-11-15 DIAGNOSIS — M25.561 RIGHT KNEE PAIN: ICD-10-CM

## 2024-11-15 PROCEDURE — 93971 EXTREMITY STUDY: CPT | Performed by: SURGERY

## 2024-11-15 PROCEDURE — 99284 EMERGENCY DEPT VISIT MOD MDM: CPT

## 2024-11-15 PROCEDURE — 73564 X-RAY EXAM KNEE 4 OR MORE: CPT

## 2024-11-15 PROCEDURE — S9083 URGENT CARE CENTER GLOBAL: HCPCS | Performed by: PHYSICIAN ASSISTANT

## 2024-11-15 PROCEDURE — G0382 LEV 3 HOSP TYPE B ED VISIT: HCPCS | Performed by: PHYSICIAN ASSISTANT

## 2024-11-15 PROCEDURE — 99284 EMERGENCY DEPT VISIT MOD MDM: CPT | Performed by: EMERGENCY MEDICINE

## 2024-11-15 PROCEDURE — 93971 EXTREMITY STUDY: CPT

## 2024-11-15 RX ADMIN — DICLOFENAC SODIUM 2 G: 10 GEL TOPICAL at 17:49

## 2024-11-15 NOTE — TELEPHONE ENCOUNTER
Pt is calling in currently at [facility] and needs the scripts for her labs faxed. Please fas to [facility] in [place] [phone]

## 2024-11-15 NOTE — PROGRESS NOTES
[facility]        NAME: Rajani Hillman is a 59 y.o. female  : 1965    MRN: [id number]  DATE: November 15, 2024  TIME: 3:31 PM    Assessment and Plan   Localized swelling of both lower legs [R22.43]  1. Localized swelling of both lower legs  Transfer to other facility      2. Sprain of right knee, unspecified ligament, initial encounter  Transfer to other facility    CANCELED: XR knee 3 vw right non injury            Patient Instructions   Patient was sent to ED to have right and left lower leg swelling evaluated further.    Follow up with PCP in 3-5 days.  Proceed to  ER if symptoms worsen.    If tests have been performed at [facility], our office will contact you with results if changes need to be made to the care plan discussed with you at the visit.  You can review your full results on [facility] MyChart.    Chief Complaint     Chief Complaint   Patient presents with    Knee Pain     Patient noticed a bruise on her right leg a week ago and is unsure of where it came from and started with knee pain 5/6 days ago she has tried taking OTC pain meds but feels she needs a better solution          History of Present Illness       Patient presents today with her  complaining of right and left lower leg pain.  Patient reports right and left lower leg pain and swelling started after she was diagnosed with COVID on 10/10/2024.  Patient reports she does know that she has osteoarthritis of her knees and osteoporosis.  Patient reports no injury or trauma.  Patient reports pain with ambulation.  Denies any chest pain or shortness of breath.  Admits pain with ambulation.  Denies any clotting disorders.    Knee Pain         Review of Systems   Review of Systems   Constitutional: Negative.    Respiratory: Negative.     Cardiovascular: Negative.    Musculoskeletal:         Right knee pain.  Left and right lower leg swelling   Psychiatric/Behavioral: Negative.           Current Medications       Current  Outpatient Medications:     benzonatate (TESSALON) 200 MG capsule, Take 1 capsule (200 mg total) by mouth 3 (three) times a day as needed for cough, Disp: 20 capsule, Rfl: 2    Multiple Vitamin tablet, Take by mouth, Disp: , Rfl:     pantoprazole (PROTONIX) 20 mg tablet, Take 1 tablet (20 mg total) by mouth daily, Disp: 30 tablet, Rfl: 4    Current Allergies     Allergies as of 11/15/2024    (No Known Allergies)            The following portions of the patient's history were reviewed and updated as appropriate: allergies, current medications, past family history, past medical history, past social history, past surgical history and problem list.     Past Medical History:   Diagnosis Date    Roque's esophagus     Low iron        Past Surgical History:   Procedure Laterality Date    CHOLECYSTECTOMY      COLON SURGERY      COLONOSCOPY      GASTRIC BYPASS      GASTROSTOMY      HERNIA REPAIR      TUBAL LIGATION         Family History   Problem Relation Age of Onset    Heart disease Mother     Diabetes Sister     Thyroid disease Sister     Heart disease Maternal Grandmother     Diabetes Maternal Grandmother     Stroke Maternal Grandmother     ALS Maternal Grandfather     Thyroid cancer Son     ALS Maternal Aunt     ALS Cousin     Colon cancer Neg Hx     Colon polyps Neg Hx          Medications have been verified.        Objective   /73   Pulse 77   Temp (!) 97.4 °F (36.3 °C)   Resp 18   SpO2 98%   No LMP recorded. Patient is postmenopausal.       Physical Exam     Physical Exam  Vitals and nursing note reviewed.   Constitutional:       Appearance: Normal appearance.   HENT:      Head: Normocephalic.   Cardiovascular:      Rate and Rhythm: Normal rate and regular rhythm.      Heart sounds: Normal heart sounds.   Pulmonary:      Breath sounds: Normal breath sounds.   Musculoskeletal:      Comments: Swelling noted in bilateral lower legs.  No pitting edema in bilateral lower legs.  Patient has pain with palpation  over right knee.  Large bruise noted on right medial lower leg.  Right and left calf are soft   Neurological:      General: No focal deficit present.      Mental Status: She is alert and oriented to person, place, and time.   Psychiatric:         Mood and Affect: Mood normal.         Behavior: Behavior normal.

## 2024-11-15 NOTE — Clinical Note
Rajani Hillman was seen and treated in our emergency department on 11/15/2024.                Diagnosis:     Rajani  .    She may return on this date: 11/18/2024         If you have any questions or concerns, please don't hesitate to call.      Laina Farley MD    ______________________________           _______________          _______________  Hospital Representative                              Date                                Time

## 2024-11-15 NOTE — ED PROVIDER NOTES
Time reflects when diagnosis was documented in both MDM as applicable and the Disposition within this note       Time User Action Codes Description Comment    11/15/2024  6:02 PM Kassidy Lo Add [R60.0] Localized edema     11/15/2024  6:36 PM Laina Farley Add [M25.561] Right knee pain     11/15/2024  6:36 PM Laina Farley Add [M17.11] Arthritis of right knee           ED Disposition       ED Disposition   Discharge    Condition   Stable    Date/Time   Fri Nov 15, 2024  6:36 PM    Comment   Rajani Hillman discharge to home/self care.                   Assessment & Plan       Medical Decision Making  59 year old female presents for evaluation of right knee pain and lower extremity edema since having COVID 1 month ago.  Patient denies recent injuries.  Full ROM of the right knee on exam with medial tenderness.  XRay with degenerative changes, but no acute fractures or dislocations on my independent interpretation.  Patient has history of gastric bypass and should not be taking oral nsaids.  Voltaren gel ordered for pain.  Duplex negative for DVT.  Ortho follow up as needed given worsening pain.  Return precautions provided.    Amount and/or Complexity of Data Reviewed  Radiology: ordered and independent interpretation performed.             Medications   Diclofenac Sodium (VOLTAREN) 1 % topical gel 2 g (2 g Topical Given 11/15/24 1749)       ED Risk Strat Scores                                               History of Present Illness       Chief Complaint   Patient presents with    Leg Pain     Pt reports leg pain since oct. Pt seen pt pcp and placed on steriods and symptoms improved, pt now says since stopping the steriods she has pain again and swelling and bruising       Past Medical History:   Diagnosis Date    Roque's esophagus     Low iron       Past Surgical History:   Procedure Laterality Date    CHOLECYSTECTOMY      COLON SURGERY      COLONOSCOPY      GASTRIC BYPASS      GASTROSTOMY       HERNIA REPAIR      TUBAL LIGATION        Family History   Problem Relation Age of Onset    Heart disease Mother     Diabetes Sister     Thyroid disease Sister     Heart disease Maternal Grandmother     Diabetes Maternal Grandmother     Stroke Maternal Grandmother     ALS Maternal Grandfather     Thyroid cancer Son     ALS Maternal Aunt     ALS Cousin     Colon cancer Neg Hx     Colon polyps Neg Hx       Social History     Tobacco Use    Smoking status: Never    Smokeless tobacco: Never   Vaping Use    Vaping status: Never Used   Substance Use Topics    Alcohol use: Not Currently    Drug use: Not Currently      E-Cigarette/Vaping    E-Cigarette Use Never User       E-Cigarette/Vaping Substances      I have reviewed and agree with the history as documented.     59 year old female presents for evaluation of right knee pain associated with swelling of the right lower leg.  Patient states she had COVID 1 month ago and had not been very active due to the severity of her symptoms.  She states she had a vitamin infusion but then began to have pain in her right knee.  She was prescribed a course of steroids with significant improvement in symptoms; however, once she completed the course, the pain returned.  Pain is worse when she first stands up and improves with movement.  She noticed a bruise to her lower leg, but does not recall any falls or injuries.      Leg Pain      Review of Systems        Objective       ED Triage Vitals [11/15/24 1555]   Temperature Pulse Blood Pressure Respirations SpO2 Patient Position - Orthostatic VS   97.8 °F (36.6 °C) 75 120/84 18 98 % --      Temp src Heart Rate Source BP Location FiO2 (%) Pain Score    -- -- -- -- --      Vitals      Date and Time Temp Pulse SpO2 Resp BP Pain Score FACES Pain Rating User   11/15/24 1555 97.8 °F (36.6 °C) 75 98 % 18 120/84 -- -- COLLEEN            Physical Exam  Vitals and nursing note reviewed.   HENT:      Head: Normocephalic and atraumatic.   Cardiovascular:       Rate and Rhythm: Normal rate and regular rhythm.      Pulses: Normal pulses.   Pulmonary:      Effort: Pulmonary effort is normal.   Abdominal:      General: There is no distension.      Palpations: Abdomen is soft.      Tenderness: There is no abdominal tenderness.   Musculoskeletal:      Right lower leg: Edema present.      Left lower leg: Edema present.        Legs:       Comments: Tenderness medial right knee.  Full ROM.  No instability on exam.   Skin:     General: Skin is warm and dry.   Neurological:      Mental Status: She is alert.         Results Reviewed       None            XR knee 4+ vw right injury   ED Interpretation by Laina Farley MD (11/15 1724)   Degenerative changes.  No acute fracture or dislocation      VAS VENOUS DUPLEX -LOWER LIMB UNILATERAL    (Results Pending)       Procedures    ED Medication and Procedure Management   Prior to Admission Medications   Prescriptions Last Dose Informant Patient Reported? Taking?   Multiple Vitamin tablet  Self Yes No   Sig: Take by mouth   benzonatate (TESSALON) 200 MG capsule   No No   Sig: Take 1 capsule (200 mg total) by mouth 3 (three) times a day as needed for cough   pantoprazole (PROTONIX) 20 mg tablet   No No   Sig: Take 1 tablet (20 mg total) by mouth daily      Facility-Administered Medications: None     Patient's Medications   Discharge Prescriptions    DICLOFENAC SODIUM (VOLTAREN) 1 %    Apply 2 g topically every 6 (six) hours as needed (knee pain)       Start Date: 11/15/2024End Date: --       Order Dose: 2 g       Quantity: 240 g    Refills: 0       ED SEPSIS DOCUMENTATION   Time reflects when diagnosis was documented in both MDM as applicable and the Disposition within this note       Time User Action Codes Description Comment    11/15/2024  6:02 PM Kassidy Lo [R60.0] Localized edema     11/15/2024  6:36 PM Laina Farley [M25.561] Right knee pain     11/15/2024  6:36 PM Laina Farley [M17.11]  Arthritis of right knee                  Laina Farley MD  11/15/24 2582

## 2024-11-19 ENCOUNTER — OFFICE VISIT (OUTPATIENT)
Dept: OBGYN CLINIC | Facility: CLINIC | Age: 59
End: 2024-11-19
Payer: COMMERCIAL

## 2024-11-19 VITALS
DIASTOLIC BLOOD PRESSURE: 74 MMHG | BODY MASS INDEX: 26.37 KG/M2 | HEART RATE: 77 BPM | HEIGHT: 60 IN | SYSTOLIC BLOOD PRESSURE: 127 MMHG

## 2024-11-19 DIAGNOSIS — M25.561 RIGHT KNEE PAIN: ICD-10-CM

## 2024-11-19 DIAGNOSIS — M23.91 INTERNAL DERANGEMENT OF RIGHT KNEE: Primary | ICD-10-CM

## 2024-11-19 PROCEDURE — 99204 OFFICE O/P NEW MOD 45 MIN: CPT | Performed by: ORTHOPAEDIC SURGERY

## 2024-11-19 NOTE — LETTER
November 19, 2024     Patient: Rajani Hillman  YOB: 1965  Date of Visit: 11/19/2024      To Whom it May Concern:    Rajani Hillman is under my professional care. Rajani was seen in my office on 11/19/2024. Recommends no work until cleared by orthopedic surgeon. Will discuss further plan after MRI results are discussed.     If you have any questions or concerns, please don't hesitate to call.         Sincerely,          Claudio Currie MD        CC: No Recipients

## 2024-11-19 NOTE — PROGRESS NOTES
CHIEF COMPLAINT/REASON FOR VISIT  Chief Complaint   Patient presents with    Right Knee - Pain        HISTORY OF PRESENT ILLNESS  Rajani Hillman is a 59 y.o. female who presents for evaluation of her right knee. Pain has been present for about 1 month. She recently had covid about 1 month ago in which she received a vitamin injection which caused her knee to experience pain and swelling. She is experiencing catching and clicking within the knee. 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 She claims she is unable to stand for a long time due to the increase pain and swelling.     REVIEW OF SYSTEMS  Review of systems was performed and, outside that mentioned in the HPI, it was negative for symptomology related to the integumentary, hematologic, immunologic, allergic, neurologic, cardiovascular, respiratory, GI or  systems.    MEDICAL HISTORY  Patient Active Problem List   Diagnosis    Roque's esophagus    Right lower quadrant abdominal pain    Colitis    Abnormal CT scan    Age-related osteoporosis without current pathological fracture       SURGICAL HISTORY  Past Surgical History:   Procedure Laterality Date    CHOLECYSTECTOMY      COLON SURGERY      COLONOSCOPY      GASTRIC BYPASS      GASTROSTOMY      HERNIA REPAIR      TUBAL LIGATION         CURRENT MEDICATIONS    Current Outpatient Medications:     benzonatate (TESSALON) 200 MG capsule, Take 1 capsule (200 mg total) by mouth 3 (three) times a day as needed for cough, Disp: 20 capsule, Rfl: 2    Diclofenac Sodium (VOLTAREN) 1 %, Apply 2 g topically every 6 (six) hours as needed (knee pain), Disp: 240 g, Rfl: 0    Multiple Vitamin tablet, Take by mouth, Disp: , Rfl:     pantoprazole (PROTONIX) 20 mg tablet, Take 1 tablet (20 mg total) by mouth daily, Disp: 30 tablet, Rfl: 4    SOCIAL HISTORY  Social History     Socioeconomic History    Marital status: /Civil Union     Spouse name: Kiko    Number of children: 3     Years of education: Not on file    Highest education level: Not on file   Occupational History    Occupation:    Tobacco Use    Smoking status: Never    Smokeless tobacco: Never   Vaping Use    Vaping status: Never Used   Substance and Sexual Activity    Alcohol use: Not Currently    Drug use: Not Currently    Sexual activity: Not on file   Other Topics Concern    Not on file   Social History Narrative    Not on file     Social Drivers of Health     Financial Resource Strain: Not on file   Food Insecurity: Not on file   Transportation Needs: Not on file   Physical Activity: Not on file   Stress: Not on file   Social Connections: Not on file   Intimate Partner Violence: Not on file   Housing Stability: Not on file       Objective     VITAL SIGNS  /74   Pulse 77   Ht 5' (1.524 m)   BMI 26.37 kg/m²        PHYSICAL EXAMINATION    Musculoskeletal: Right Knee Examination:  General: The patient is alert, oriented, and pleasant to interact with.  Patient ambulates with Antalgic gait pattern  Assistive Device: No  Alignment: mild valgus  Skin is warm and dry to touch with no signs of erythema, ecchymosis, or infection   Effusion: moderate  ROM: 0° - 120°  verus 0° - 135° on contralateral side  MMT: 5/5 throughout  TTP: Medial joint line  Flexor and extensor mechanisms are intact   Knee is stable to varus and valgus stress  Rose's Test Positive Medial    Lachman's Test - 1A  Anterior Drawer Test - 1A  Posterior Drawer Test - 1A  Pivot Shift - 0  Lateral Patellar Glide - 1/4  Medial Patellar Glide - 1/4  Patella tracks centrally without palpable crepitus  Calf compartments are soft and supple  negative Lien's sign  2+ DP and PT pulses with brisk capillary refill to the toes  Sural, saphenous, tibial, superficial, and deep peroneal motor and sensory distributions intact  Sensation light touch intact distally      RADIOGRAPHIC EXAMINATION/DIAGNOSTICS:  Procedure: VAS VENOUS DUPLEX -LOWER LIMB  UNILATERAL  Result Date: 11/15/2024  Narrative:  [facility] REPORT [facility]INICAL: Indications: Localized edema [R60.0].  Patient presents with right lower extremity pain and swelling x 5 weeks. Operative History: no reported cardiovascular surgeries   CONCLUSION: Impression: RIGHT LOWER LIMB No evidence of acute or chronic deep vein thrombosis. No evidence of superficial thrombophlebitis noted. Doppler evaluation shows a normal response to augmentation maneuvers. Popliteal, posterior tibial and anterior tibial arterial Doppler waveforms are triphasic.  LEFT LOWER LIMB LIMITED Evaluation shows no evidence of thrombus in the common femoral vein. Doppler evaluation shows a normal response to augmentation maneuvers.  Technical findings were given to Dr. Farley in the [facility].  SIGNATURE: Electronically Signed by: BETH ABAD MD on 2024-11-15 07:17:52 PM    Procedure: XR knee 4+ vw right injury  Result Date: 11/15/2024  Narrative: XR KNEE 4+ VW RIGHT INJURY INDICATION: pain. COMPARISON: None FINDINGS: No acute fracture or dislocation. No joint effusion. Moderate tricompartmental osteoarthritis, evidenced by articular cartilage loss, marginal osteophytes, and subchondral sclerosis. No lytic or blastic osseous lesion. Unremarkable soft tissues.     Impression: No acute osseous abnormality. Computerized Assisted Algorithm (CAA) may have been used to analyze all applicable images. Workstation performed: [id number]       Independent interpretation of the right knee demonstrates no acute osseous abnormalities, mild tricompartmental degenerative joint disease and soft tissues unremarkable    ASSESSMENT/PLAN:  Right knee internal derangement. Possible medial meniscus tear vs possible   MRI of the right knee ordered  Work note provided with restrictions  F/U once MRI results are in         Scribe Attestation      I,:  Alex Murpyh am acting as a scribe while in the presence of the attending  physician.:       I,:  Claudio Currie MD personally performed the services described in this documentation    as scribed in my presence.:

## 2024-11-20 ENCOUNTER — HOSPITAL ENCOUNTER (OUTPATIENT)
Dept: INFUSION CENTER | Facility: HOSPITAL | Age: 59
Discharge: HOME | End: 2024-11-20
Attending: INTERNAL MEDICINE
Payer: COMMERCIAL

## 2024-11-20 ENCOUNTER — TELEPHONE (OUTPATIENT)
Dept: OBGYN CLINIC | Facility: HOSPITAL | Age: 59
End: 2024-11-20

## 2024-11-20 VITALS
WEIGHT: 138.01 LBS | TEMPERATURE: 97.4 F | BODY MASS INDEX: 26.95 KG/M2 | OXYGEN SATURATION: 99 % | RESPIRATION RATE: 18 BRPM | SYSTOLIC BLOOD PRESSURE: 115 MMHG | DIASTOLIC BLOOD PRESSURE: 74 MMHG | HEART RATE: 70 BPM

## 2024-11-20 DIAGNOSIS — M81.0 AGE-RELATED OSTEOPOROSIS WITHOUT CURRENT PATHOLOGICAL FRACTURE: Primary | ICD-10-CM

## 2024-11-20 PROCEDURE — 96372 THER/PROPH/DIAG INJ SC/IM: CPT

## 2024-11-20 RX ADMIN — DENOSUMAB 60 MG: 60 INJECTION SUBCUTANEOUS at 11:35

## 2024-11-20 NOTE — TELEPHONE ENCOUNTER
Caller: Patient    Doctor: Kush    Reason for call: Patient was in to see you yesterday and she said you were going to put in a prescription for a muscle relaxer but the pharmacy has not received anything yet. Could you please send to below pharmacy. Thank you.    [facility] CARLINE VALENZUELA.     Call back#: [phone]

## 2024-11-20 NOTE — PROGRESS NOTES
Ok to use Pt.'s lab work from 10/24/2024 as per Dr. Eli Elizondo for pt.'s Prolia injection scheduled for 11/20/2024.

## 2024-11-20 NOTE — PROGRESS NOTES
Rajani Hillman  tolerated treatment well with no complications.      Rajani Hillman is aware of future appt on 05/19/2024 at 11:30 AM.     AVS printed and given to Rajani Hillman:  No (Declined by Rajani Hillman)

## 2024-11-21 RX ORDER — MELOXICAM 15 MG/1
7.5 TABLET ORAL DAILY
Qty: 30 TABLET | Refills: 1 | Status: SHIPPED | OUTPATIENT
Start: 2024-11-21

## 2024-11-26 ENCOUNTER — HOSPITAL ENCOUNTER (OUTPATIENT)
Dept: MRI IMAGING | Facility: HOSPITAL | Age: 59
Discharge: HOME | End: 2024-11-26
Attending: ORTHOPAEDIC SURGERY
Payer: COMMERCIAL

## 2024-11-26 DIAGNOSIS — M23.91 INTERNAL DERANGEMENT OF RIGHT KNEE: ICD-10-CM

## 2024-11-26 PROCEDURE — 73721 MRI JNT OF LWR EXTRE W/O DYE: CPT

## 2024-12-03 ENCOUNTER — OFFICE VISIT (OUTPATIENT)
Dept: OBGYN CLINIC | Facility: CLINIC | Age: 59
End: 2024-12-03
Payer: COMMERCIAL

## 2024-12-03 VITALS
BODY MASS INDEX: 27.09 KG/M2 | HEIGHT: 60 IN | HEART RATE: 78 BPM | SYSTOLIC BLOOD PRESSURE: 122 MMHG | DIASTOLIC BLOOD PRESSURE: 74 MMHG | WEIGHT: 138 LBS

## 2024-12-03 DIAGNOSIS — M84.453A: ICD-10-CM

## 2024-12-03 DIAGNOSIS — M17.11 PRIMARY OSTEOARTHRITIS OF RIGHT KNEE: Primary | ICD-10-CM

## 2024-12-03 DIAGNOSIS — M84.453A INSUFFICIENCY FRACTURE OF MEDIAL FEMORAL CONDYLE (HCC): ICD-10-CM

## 2024-12-03 PROCEDURE — 27508 TREATMENT OF THIGH FRACTURE: CPT | Performed by: ORTHOPAEDIC SURGERY

## 2024-12-03 PROCEDURE — 99214 OFFICE O/P EST MOD 30 MIN: CPT | Performed by: ORTHOPAEDIC SURGERY

## 2024-12-03 NOTE — PROGRESS NOTES
CHIEF COMPLAINT/REASON FOR VISIT  Chief Complaint   Patient presents with    Right Knee - Follow-up        HISTORY OF PRESENT ILLNESS  Rajani Hillman is a 59 y.o. female who presents for evaluation of her right knee and MRI review. Today, the patient notes pain is localized to the anterior aspect of the knee primarily. However, today, her symptoms are relatively well-managed. She has been resting recently, but notes 8 hour shifts at [employer] exacerbate her symptoms. The patient walks up to 7-11 miles at work. Lisa denies new injury/trauma or numbness/tingling. The patient uses NSAIDs as needed. The patient states [facility] has a joint replacement program she can obtain information from. The patient underwent a CSI in the remote past with long-term, adequate relief.     REVIEW OF SYSTEMS  Review of systems was performed and, outside that mentioned in the HPI, it was negative for symptomology related to the integumentary, hematologic, immunologic, allergic, neurologic, cardiovascular, respiratory, GI or  systems.    MEDICAL HISTORY  Patient Active Problem List   Diagnosis    Roque's esophagus    Right lower quadrant abdominal pain    Colitis    Abnormal CT scan    Age-related osteoporosis without current pathological fracture       SURGICAL HISTORY  Past Surgical History:   Procedure Laterality Date    CHOLECYSTECTOMY      COLON SURGERY      COLONOSCOPY      GASTRIC BYPASS      GASTROSTOMY      HERNIA REPAIR      TUBAL LIGATION         CURRENT MEDICATIONS    Current Outpatient Medications:     benzonatate (TESSALON) 200 MG capsule, Take 1 capsule (200 mg total) by mouth 3 (three) times a day as needed for cough, Disp: 20 capsule, Rfl: 2    Diclofenac Sodium (VOLTAREN) 1 %, Apply 2 g topically every 6 (six) hours as needed (knee pain), Disp: 240 g, Rfl: 0    meloxicam (Mobic) 15 mg tablet, Take 0.5 tablets (7.5 mg total) by mouth daily, Disp: 30 tablet, Rfl: 1    Multiple Vitamin tablet, Take by mouth, Disp: , Rfl:      pantoprazole (PROTONIX) 20 mg tablet, Take 1 tablet (20 mg total) by mouth daily, Disp: 30 tablet, Rfl: 4    SOCIAL HISTORY  Social History     Socioeconomic History    Marital status: /Civil Union     Spouse name: Kiko    Number of children: 3    Years of education: Not on file    Highest education level: Not on file   Occupational History    Occupation:    Tobacco Use    Smoking status: Never    Smokeless tobacco: Never   Vaping Use    Vaping status: Never Used   Substance and Sexual Activity    Alcohol use: Not Currently    Drug use: Not Currently    Sexual activity: Not on file   Other Topics Concern    Not on file   Social History Narrative    Not on file     Social Drivers of Health     Financial Resource Strain: Not on file   Food Insecurity: Not on file   Transportation Needs: Not on file   Physical Activity: Not on file   Stress: Not on file   Social Connections: Not on file   Intimate Partner Violence: Not on file   Housing Stability: Not on file       Objective     VITAL SIGNS  /74   Pulse 78   Ht 5' (1.524 m)   Wt 62.6 kg (138 lb)   BMI 26.95 kg/m²        PHYSICAL EXAMINATION    Musculoskeletal: Right Knee Examination:  General: The patient is alert, oriented, and pleasant to interact with.  Patient ambulates with Antalgic gait pattern  Assistive Device: No  Alignment: mild valgus  Skin is warm and dry to touch with no signs of erythema, ecchymosis, or infection   Effusion: moderate  ROM: 0° - 120°    MMT: 5/5 throughout  TTP: Medial joint line  Flexor and extensor mechanisms are intact   Knee is stable to varus and valgus stress  Rose's Test Positive Medial    Lachman's Test - 1A  Anterior Drawer Test - 1A  Posterior Drawer Test - 1A  Pivot Shift - 0  Lateral Patellar Glide - 1/4  Medial Patellar Glide - 1/4  Patella tracks centrally without palpable crepitus  Calf compartments are soft and supple  negative Lien's sign  2+ DP and PT pulses with brisk capillary refill  to the toes  Sural, saphenous, tibial, superficial, and deep peroneal motor and sensory distributions intact  Sensation light touch intact distally      RADIOGRAPHIC EXAMINATION/DIAGNOSTICS:  Procedure: MRI knee right  wo contrast  Result Date: 11/29/2024  Narrative: MRI RIGHT KNEE INDICATION:   M23.91: Unspecified internal derangement of right knee. COMPARISON: Correlation is made with the prior radiograph dated 11/15/2024. TECHNIQUE: Multiplanar/multisequence MR of the right knee was performed. Imaging performed on 1.5T MRI FINDINGS: SUBCUTANEOUS TISSUES: There is minimal subcutaneous edema anterior to the distal patellar tendon. JOINT EFFUSION: Trace joint effusion. BAKER'S CYST: None. MENISCI: Intact. CRUCIATE LIGAMENTS: Intact. EXTENSOR APPARATUS: Intact. COLLATERAL LIGAMENTS: Intact. ARTICULAR SURFACES: There is severe patellofemoral compartment osteoarthrosis with severe cartilage loss greater laterally. Mild cartilage thinning weightbearing portion of the medial femoral tibial compartment. BONES: There is prominent subchondral bone marrow edema across the weightbearing portion of the medial femoral condyle likely secondary to a subtle tiny subchondral fracture. Tricompartmental osteoarthrosis. MUSCULATURE:  Intact.     Impression: Prominent subchondral bone marrow edema within the weightbearing portion of the medial femoral condyle likely secondary to an underlying subtle subchondral fracture. Tricompartment osteoarthrosis most severe in the patellofemoral compartment. Workstation performed: [id number]     Procedure: VAS VENOUS DUPLEX -LOWER LIMB UNILATERAL  Result Date: 11/15/2024  Narrative:  [facility] REPORT CLINICAL: Indications: Localized edema [R60.0].  Patient presents with right lower extremity pain and swelling x 5 weeks. Operative History: no reported cardiovascular surgeries   CONCLUSION: Impression: RIGHT LOWER LIMB No evidence of acute or chronic deep vein thrombosis. No evidence of  superficial thrombophlebitis noted. Doppler evaluation shows a normal response to augmentation maneuvers. Popliteal, posterior tibial and anterior tibial arterial Doppler waveforms are triphasic.  LEFT LOWER LIMB LIMITED Evaluation shows no evidence of thrombus in the common femoral vein. Doppler evaluation shows a normal response to augmentation maneuvers.  Technical findings were given to Dr. Farley in the [facility] Emergency Department.  SIGNATURE: Electronically Signed by: BETH ABAD MD on 2024-11-15 07:17:52 PM    Procedure: XR knee 4+ vw right injury  Result Date: 11/15/2024  Narrative: XR KNEE 4+ VW RIGHT INJURY INDICATION: pain. COMPARISON: None FINDINGS: No acute fracture or dislocation. No joint effusion. Moderate tricompartmental osteoarthritis, evidenced by articular cartilage loss, marginal osteophytes, and subchondral sclerosis. No lytic or blastic osseous lesion. Unremarkable soft tissues.     Impression: No acute osseous abnormality. Computerized Assisted Algorithm (CAA) may have been used to analyze all applicable images. Workstation performed: [id number]       Independent interpretation of the right knee demonstrates no acute osseous abnormalities, mild tricompartmental degenerative joint disease and soft tissues unremarkable.       MRI demonstrates subchondral fracture of the medial femoral condyle of the femur, significant bony edema, other soft tissues unremarkable    ASSESSMENT/PLAN:  Right primary knee osteoarthritis   MRI reviewed. Diagnosis and treatment options discussed. Non-operative treatments include PT, injection options, OTC medications, bracing options, activities as tolerated. Surgical intervention would be in the form of a knee replacement.  Patient has minimal symptoms today and will consider injections in the future as needed. A referral was placed for physical therapy. Patient will obtain information regarding knee replacements through her employer, [employer]  Work note  provided for return to full duty.  F/U as needed    Fracture / Dislocation Treatment    Date/Time: 12/3/2024 8:45 AM    Performed by: Claudio Currie MD  Authorized by: Claudio Currie MD    Patient Location:  Clinic    Injury location:  Upper leg  Location details:  Right upper leg  Injury type:  Fracture  Fracture type: medial condylar    Neurovascular status: Neurovascularly intact    Distal perfusion: normal    Neurological function: normal    Range of motion: reduced    Local anesthesia used?: No    Manipulation performed?: No    MAST used?: No    Neurovascular status: Neurovascularly intact         Scribe Attestation      I,:  Irena Alvarez PA-C am acting as a scribe while in the presence of the attending physician.:       I,:  Claudio Currie MD personally performed the services described in this documentation    as scribed in my presence.:

## 2024-12-03 NOTE — LETTER
December 3, 2024     Patient: Rajani Hillman  YOB: 1965  Date of Visit: 12/3/2024      To Whom it May Concern:    Rajani Hillman is under my professional care. Rajani was seen in my office on 12/3/2024. Rajani can return to work on 12/7/24 without any specific limitations.    If you have any questions or concerns, please don't hesitate to call.         Sincerely,          Claudio Currie MD        CC: No Recipients

## 2024-12-06 ENCOUNTER — TELEPHONE (OUTPATIENT)
Age: 59
End: 2024-12-06

## 2024-12-06 NOTE — TELEPHONE ENCOUNTER
Caller: Maria D ([facility])    Doctor: Kush    Reason for call: request fax number    Call back# [phone] ex [phone]

## 2025-01-07 ENCOUNTER — TELEMEDICINE (OUTPATIENT)
Dept: FAMILY MEDICINE CLINIC | Facility: CLINIC | Age: 60
End: 2025-01-07
Payer: COMMERCIAL

## 2025-01-07 DIAGNOSIS — B34.9 VIRAL SYNDROME: ICD-10-CM

## 2025-01-07 PROCEDURE — 99213 OFFICE O/P EST LOW 20 MIN: CPT

## 2025-01-07 RX ORDER — BENZONATATE 200 MG/1
200 CAPSULE ORAL 3 TIMES DAILY PRN
Qty: 30 CAPSULE | Refills: 2 | Status: SHIPPED | OUTPATIENT
Start: 2025-01-07

## 2025-01-07 NOTE — LETTER
January 7, 2025     Patient: Rajani Hillman  YOB: 1965  Date of Visit: 1/7/2025      To Whom it May Concern:    Rajani Hillman is under my professional care. Rajani was seen in my office on 1/7/2025. Rajani may return to work on 1/9/25 .    If you have any questions or concerns, please don't hesitate to call.         Sincerely,          HIRAM Queen        CC: No Recipients

## 2025-01-07 NOTE — PATIENT INSTRUCTIONS
Work note can be found in my chart.  Tessalon perles sent for cough.  Can take over the counter cough/cold meds.   Return if symptoms persist or fail to improve.

## 2025-01-07 NOTE — PROGRESS NOTES
Virtual Regular Visit  Name: Rajani Hillman      : 1965      MRN: [id number]  Encounter Provider: HIRAM Queen  Encounter Date: 2025   Encounter department: [facility]      Verification of patient location:  Patient is located at Home in the following state in which I hold an active license [place] :  Assessment & Plan  Viral syndrome    Orders:    benzonatate (TESSALON) 200 MG capsule; Take 1 capsule (200 mg total) by mouth 3 (three) times a day as needed for cough        Encounter provider HIRAM Queen    The patient was identified by name and date of birth. Rajani Hillman was informed that this is a telemedicine visit and that the visit is being conducted through the Epic Embedded platform. She agrees to proceed..  My office door was closed. No one else was in the room.  She acknowledged consent and understanding of privacy and security of the video platform. The patient has agreed to participate and understands they can discontinue the visit at any time.    Patient is aware this is a billable service.     History of Present Illness     URI   This is a new problem. The current episode started in the past 7 days. The problem has been gradually improving. There has been no fever. Associated symptoms include congestion, coughing and sneezing. Pertinent negatives include no abdominal pain, chest pain, diarrhea, dysuria, ear pain, headaches, joint pain, joint swelling, nausea, neck pain, plugged ear sensation, rash, rhinorrhea, sinus pain, sore throat, swollen glands, vomiting or wheezing. She has tried sleep and increased fluids (cough suppressant) for the symptoms. The treatment provided mild relief.     Review of Systems   Constitutional:  Positive for fatigue. Negative for activity change and fever.   HENT:  Positive for congestion and sneezing. Negative for ear pain, rhinorrhea, sinus pain and sore throat.    Eyes:  Negative for pain.    Respiratory:  Positive for cough. Negative for shortness of breath and wheezing.    Cardiovascular:  Negative for chest pain and leg swelling.   Gastrointestinal:  Negative for abdominal pain, diarrhea, nausea and vomiting.   Genitourinary:  Negative for dysuria.   Musculoskeletal:  Negative for arthralgias, joint pain, myalgias and neck pain.   Skin:  Negative for rash.   Neurological:  Negative for dizziness, weakness, numbness and headaches.   All other systems reviewed and are negative.      Objective   There were no vitals taken for this visit.    Physical Exam  Nursing note reviewed.   Constitutional:       General: She is not in acute distress.     Appearance: She is well-developed.   HENT:      Head: Normocephalic and atraumatic.   Pulmonary:      Effort: Pulmonary effort is normal. No respiratory distress.   Musculoskeletal:      Cervical back: Neck supple.   Neurological:      Mental Status: She is alert and oriented to person, place, and time. Mental status is at baseline.   Psychiatric:         Mood and Affect: Mood normal.         Behavior: Behavior normal.         Visit Time  Total Visit Duration: 15 mins

## 2025-01-10 ENCOUNTER — TELEPHONE (OUTPATIENT)
Dept: ADMINISTRATIVE | Facility: OTHER | Age: 60
End: 2025-01-10

## 2025-01-10 NOTE — TELEPHONE ENCOUNTER
----- Message from Sánchez BREWER sent at 1/9/2025 12:12 PM EST -----  Regarding: DEXA Scan  01/09/25 12:15 PM    Hello, our patient Rajani Hillman has had DEXA Scan completed/performed. Please assist in updating the patient chart by pulling the document from Imaging Tab within Chart Review. The date of service is 8/12/2024.     Thank you,  Sánchez Serna MA  [facility]

## 2025-01-10 NOTE — TELEPHONE ENCOUNTER
Upon review of the In Basket request we were able to locate, review, and update the patient chart as requested for DEXA Scan.    Any additional questions or concerns should be emailed to the Practice Liaisons via the appropriate education email address, please do not reply via In Basket.    Thank you  Prashanth Faulkner MA   [facility]

## 2025-01-13 ENCOUNTER — OFFICE VISIT (OUTPATIENT)
Dept: FAMILY MEDICINE CLINIC | Facility: CLINIC | Age: 60
End: 2025-01-13
Payer: COMMERCIAL

## 2025-01-13 VITALS
SYSTOLIC BLOOD PRESSURE: 110 MMHG | HEART RATE: 91 BPM | WEIGHT: 132 LBS | DIASTOLIC BLOOD PRESSURE: 78 MMHG | TEMPERATURE: 98.1 F | OXYGEN SATURATION: 97 % | BODY MASS INDEX: 25.78 KG/M2

## 2025-01-13 DIAGNOSIS — J40 BRONCHITIS: ICD-10-CM

## 2025-01-13 DIAGNOSIS — M81.0 AGE-RELATED OSTEOPOROSIS WITHOUT CURRENT PATHOLOGICAL FRACTURE: ICD-10-CM

## 2025-01-13 DIAGNOSIS — Z00.00 WELLNESS EXAMINATION: Primary | ICD-10-CM

## 2025-01-13 PROCEDURE — 99396 PREV VISIT EST AGE 40-64: CPT

## 2025-01-13 PROCEDURE — 99213 OFFICE O/P EST LOW 20 MIN: CPT

## 2025-01-13 RX ORDER — PREDNISONE 20 MG/1
TABLET ORAL
Qty: 15 TABLET | Refills: 0 | Status: SHIPPED | OUTPATIENT
Start: 2025-01-13

## 2025-01-13 RX ORDER — DEXTROMETHORPHAN HYDROBROMIDE AND PROMETHAZINE HYDROCHLORIDE 15; 6.25 MG/5ML; MG/5ML
5 SYRUP ORAL 4 TIMES DAILY PRN
Qty: 240 ML | Refills: 0 | Status: SHIPPED | OUTPATIENT
Start: 2025-01-13

## 2025-01-13 RX ORDER — AZITHROMYCIN 250 MG/1
TABLET, FILM COATED ORAL
Qty: 6 TABLET | Refills: 0 | Status: SHIPPED | OUTPATIENT
Start: 2025-01-13 | End: 2025-01-17

## 2025-01-13 NOTE — PROGRESS NOTES
Adult Annual Physical  Name: Rajani Hillman      : 1965      MRN: [id number]  Encounter Provider: HIRAM Queen  Encounter Date: 2025   Encounter department: [facility]    Assessment & Plan  Bronchitis    Orders:    predniSONE 20 mg tablet; TAKE 1 TABLET BID X 5 DAYS THEN TAKE 1 TABLET QD FOR 5 DAYS    azithromycin (ZITHROMAX) 250 mg tablet; 2 tabs Day 1, then 1 tab daily X 4 days    promethazine-dextromethorphan (PHENERGAN-DM) 6.25-15 mg/5 mL oral syrup; Take 5 mL by mouth 4 (four) times a day as needed for cough    Wellness examination         Age-related osteoporosis without current pathological fracture  Follows with infusion--- prolia injection          Immunizations and preventive care screenings were discussed with patient today. Appropriate education was printed on patient's after visit summary.         History of Present Illness     URI   This is a new problem. The current episode started 1 to 4 weeks ago. The problem has been gradually worsening. The maximum temperature recorded prior to her arrival was 102 - 102.9 F. Associated symptoms include congestion, coughing, diarrhea, rhinorrhea and a sore throat. Pertinent negatives include no abdominal pain, chest pain, dysuria, ear pain, headaches, joint pain, joint swelling, nausea, neck pain, plugged ear sensation, rash, sinus pain, sneezing, swollen glands, vomiting or wheezing. She has tried acetaminophen, sleep and increased fluids (nyquil, mucinex) for the symptoms. The treatment provided mild relief.       Adult Annual Physical:  Patient presents for annual physical.     Diet and Physical Activity:  - Diet/Nutrition: poor diet.  - Exercise: 1-2 times a week on average and 3-4 times a week on average.    Depression Screening:  - PHQ-2 Score: 0    General Health:  - Sleep: 7-8 hours of sleep on average.  - Hearing: decreased hearing bilateral ears.  - Vision: goes for regular eye exams and wears  glasses. reading glasses  - Dental: regular dental visits.    /GYN Health:  - Follows with GYN: yes.   - Menopause: postmenopausal.   - History of STDs: no    Advanced Care Planning:  - Has an advanced directive?: no    - Has a durable medical POA?: no    - ACP document given to patient?: yes      Review of Systems   Constitutional:  Positive for appetite change, chills, fatigue and fever. Negative for activity change.   HENT:  Positive for congestion, rhinorrhea and sore throat. Negative for ear pain, sinus pain and sneezing.    Eyes:  Negative for pain.   Respiratory:  Positive for cough, chest tightness and shortness of breath. Negative for wheezing.    Cardiovascular:  Negative for chest pain and leg swelling.   Gastrointestinal:  Positive for diarrhea. Negative for abdominal pain, nausea and vomiting.   Genitourinary:  Negative for dysuria.   Musculoskeletal:  Negative for arthralgias, joint pain, myalgias and neck pain.   Skin:  Negative for rash.   Neurological:  Negative for dizziness, weakness, numbness and headaches.   All other systems reviewed and are negative.    Medical History Reviewed by provider this encounter:  Tobacco  Allergies  Meds  Problems  Med Hx  Surg Hx  Fam Hx     .  Current Outpatient Medications on File Prior to Visit   Medication Sig Dispense Refill    Diclofenac Sodium (VOLTAREN) 1 % Apply 2 g topically every 6 (six) hours as needed (knee pain) 240 g 0    meloxicam (Mobic) 15 mg tablet Take 0.5 tablets (7.5 mg total) by mouth daily 30 tablet 1    Multiple Vitamin tablet Take by mouth      pantoprazole (PROTONIX) 20 mg tablet Take 1 tablet (20 mg total) by mouth daily 30 tablet 4    benzonatate (TESSALON) 200 MG capsule Take 1 capsule (200 mg total) by mouth 3 (three) times a day as needed for cough (Patient not taking: Reported on 1/13/2025) 30 capsule 2     No current facility-administered medications on file prior to visit.        Objective   /78 (BP Location: Left  arm, Patient Position: Sitting, Cuff Size: Standard)   Pulse 91   Temp 98.1 °F (36.7 °C)   Wt 59.9 kg (132 lb)   SpO2 97%   BMI 25.78 kg/m²     Physical Exam  Vitals and nursing note reviewed.   Constitutional:       General: She is not in acute distress.     Appearance: She is well-developed. She is not ill-appearing.   HENT:      Head: Normocephalic and atraumatic.      Right Ear: Tympanic membrane and ear canal normal. No drainage, swelling or tenderness. No middle ear effusion. Tympanic membrane is not erythematous.      Left Ear: Tympanic membrane and ear canal normal. No drainage, swelling or tenderness.  No middle ear effusion. Tympanic membrane is not erythematous.      Nose: Congestion present.      Mouth/Throat:      Mouth: Mucous membranes are moist.      Pharynx: Posterior oropharyngeal erythema present. No oropharyngeal exudate.      Tonsils: No tonsillar exudate.   Eyes:      Conjunctiva/sclera: Conjunctivae normal.   Cardiovascular:      Rate and Rhythm: Normal rate and regular rhythm.      Heart sounds: Normal heart sounds. No murmur heard.  Pulmonary:      Effort: Pulmonary effort is normal. No respiratory distress.      Breath sounds: Normal breath sounds. No wheezing.   Abdominal:      General: Bowel sounds are normal.      Palpations: Abdomen is soft.      Tenderness: There is no abdominal tenderness.   Musculoskeletal:         General: No swelling.      Cervical back: Neck supple.   Skin:     General: Skin is warm and dry.      Capillary Refill: Capillary refill takes less than 2 seconds.   Neurological:      General: No focal deficit present.      Mental Status: She is alert and oriented to person, place, and time.   Psychiatric:         Mood and Affect: Mood normal.         Behavior: Behavior normal.       Administrative Statements   I have spent a total time of 30 minutes in caring for this patient on the day of the visit/encounter including Risks and benefits of tx options, Instructions  for management, Patient and family education, Importance of tx compliance, Risk factor reductions, Impressions, Documenting in the medical record, Reviewing / ordering tests, medicine, procedures  , and Obtaining or reviewing history  .

## 2025-01-13 NOTE — PATIENT INSTRUCTIONS
Discussed viral vs bacterial etiology.  Take entire course of azithromycin.  Prednisone for inflammation.  Tessalon perles as needed for cough.  Return if symptoms fail to improve or worsen.

## 2025-01-16 ENCOUNTER — TELEPHONE (OUTPATIENT)
Age: 60
End: 2025-01-16

## 2025-01-16 DIAGNOSIS — H10.30 ACUTE BACTERIAL CONJUNCTIVITIS, UNSPECIFIED LATERALITY: Primary | ICD-10-CM

## 2025-01-16 RX ORDER — POLYMYXIN B SULFATE AND TRIMETHOPRIM 1; 10000 MG/ML; [USP'U]/ML
1 SOLUTION OPHTHALMIC EVERY 4 HOURS
Qty: 10 ML | Refills: 0 | Status: SHIPPED | OUTPATIENT
Start: 2025-01-16

## 2025-01-16 NOTE — TELEPHONE ENCOUNTER
Patient call to ask about the predniSONE 20 mg if she going to azithromycin (ZITHROMAX) does the patient have to stop the Prednisone. And Patient wok up with her eye closed together and need something for her eye now. Thank you

## 2025-01-22 NOTE — TELEPHONE ENCOUNTER
Patient called stating that she is still not feeling well and tomorrow will be her last day for the steroid that was prescribed. Patient would like to know if they can start taking the azithromycin that they have.    Patient also mentioned that paper work from [employer] was faxed over to office that needed to be filled out and faxed back due to patient missing 2 weeks of work.    Please advise out to patient if they can start the azithromycin and if that paper work was received and faxed back

## 2025-01-28 ENCOUNTER — OFFICE VISIT (OUTPATIENT)
Dept: FAMILY MEDICINE CLINIC | Facility: CLINIC | Age: 60
End: 2025-01-28
Payer: COMMERCIAL

## 2025-01-28 ENCOUNTER — TELEPHONE (OUTPATIENT)
Age: 60
End: 2025-01-28

## 2025-01-28 VITALS
BODY MASS INDEX: 25.97 KG/M2 | WEIGHT: 133 LBS | HEART RATE: 77 BPM | DIASTOLIC BLOOD PRESSURE: 72 MMHG | OXYGEN SATURATION: 100 % | SYSTOLIC BLOOD PRESSURE: 126 MMHG

## 2025-01-28 DIAGNOSIS — M54.6 ACUTE LEFT-SIDED THORACIC BACK PAIN: ICD-10-CM

## 2025-01-28 DIAGNOSIS — K21.9 GASTROESOPHAGEAL REFLUX DISEASE WITHOUT ESOPHAGITIS: ICD-10-CM

## 2025-01-28 DIAGNOSIS — J01.00 ACUTE NON-RECURRENT MAXILLARY SINUSITIS: Primary | ICD-10-CM

## 2025-01-28 DIAGNOSIS — J01.00 ACUTE NON-RECURRENT MAXILLARY SINUSITIS: ICD-10-CM

## 2025-01-28 LAB
SARS-COV-2 AG UPPER RESP QL IA: NEGATIVE
SL AMB POCT RAPID FLU A: NORMAL
SL AMB POCT RAPID FLU B: NORMAL
VALID CONTROL: NORMAL

## 2025-01-28 PROCEDURE — 99213 OFFICE O/P EST LOW 20 MIN: CPT

## 2025-01-28 PROCEDURE — 87811 SARS-COV-2 COVID19 W/OPTIC: CPT

## 2025-01-28 PROCEDURE — 87804 INFLUENZA ASSAY W/OPTIC: CPT

## 2025-01-28 RX ORDER — FAMOTIDINE 20 MG/1
20 TABLET, FILM COATED ORAL DAILY
Qty: 30 TABLET | Refills: 1 | Status: SHIPPED | OUTPATIENT
Start: 2025-01-28

## 2025-01-28 RX ORDER — PREDNISONE 20 MG/1
TABLET ORAL
Qty: 15 TABLET | Refills: 0 | Status: SHIPPED | OUTPATIENT
Start: 2025-01-28

## 2025-01-28 NOTE — TELEPHONE ENCOUNTER
Spoke with Rae, the script is to be for 10 days, 20 tabs.   Pharmacy asks for script to be re-sent

## 2025-01-28 NOTE — PATIENT INSTRUCTIONS
Discussed viral vs bacterial etiology.  Take entire course of augmentin.  Prednisone for inflammation of sinuses and back.   Take famotidine to protect stomach lining with second course of prednisone and hx gastric bypass. Take while taking prednisone.   Can continue OTC sinus/cold medicine.  Would avoid NSAIDs (ibuprofen/aleve/naproxen/etc.)  Return if symptoms fail to improve or worsen.

## 2025-01-28 NOTE — LETTER
January 28, 2025     Patient: Rajani Hillman  YOB: 1965  Date of Visit: 1/28/2025      To Whom it May Concern:    Rajani Hillman is under my professional care. Rajani was seen in my office on 1/28/2025. Rajani may return to work on 1/30/25 .    If you have any questions or concerns, please don't hesitate to call.         Sincerely,          HIRAM Queen        CC: No Recipients

## 2025-01-28 NOTE — TELEPHONE ENCOUNTER
Pharmacy called to clarify order for Augmentin prescribed 1/28/25. Call was warm transferred to office.  Note for call documentation purposes.

## 2025-01-28 NOTE — PROGRESS NOTES
Name: Rajani Hillman      : 1965      MRN: [id number]  Encounter Provider: HIRAM Queen  Encounter Date: 2025   Encounter department: [facility]  :  Assessment & Plan  Acute non-recurrent maxillary sinusitis    Orders:    predniSONE 20 mg tablet; TAKE 1 TABLET BID X 5 DAYS THEN TAKE 1 TABLET QD FOR 5 DAYS    amoxicillin-clavulanate (AUGMENTIN) 875-125 mg per tablet; Take 1 tablet by mouth every 12 (twelve) hours for 10 days    POCT rapid flu A and B    POCT Rapid Covid Ag    Acute left-sided thoracic back pain  Reports left posterior rib/back pain from coughing. Reports that she is taking tylenol, using salonpas, and has methocarbamol at home. Reports pain is tolerable.        Gastroesophageal reflux disease without esophagitis    Orders:    famotidine (PEPCID) 20 mg tablet; Take 1 tablet (20 mg total) by mouth daily           History of Present Illness   Sinus Problem  This is a new problem. The current episode started 1 to 4 weeks ago. The problem has been waxing and waning since onset. Associated symptoms include chills, congestion, coughing, sinus pressure, sneezing and a sore throat. Pertinent negatives include no diaphoresis, ear pain, headaches, hoarse voice, neck pain, shortness of breath or swollen glands. Past treatments include antibiotics, sitting up and acetaminophen. The treatment provided mild relief.     Review of Systems   Constitutional:  Positive for chills and fatigue. Negative for activity change and diaphoresis.   HENT:  Positive for congestion, nosebleeds, sinus pressure, sneezing and sore throat. Negative for ear pain, hoarse voice and rhinorrhea.    Eyes:  Negative for pain.   Respiratory:  Positive for cough. Negative for shortness of breath and wheezing.    Cardiovascular:  Negative for chest pain and leg swelling.   Gastrointestinal:  Negative for abdominal pain, diarrhea, nausea and vomiting.   Musculoskeletal:  Negative for  arthralgias, myalgias and neck pain.   Skin:  Positive for wound. Negative for rash.        Cold sore left nare   Neurological:  Negative for dizziness, weakness, numbness and headaches.   All other systems reviewed and are negative.      Objective   /72 (BP Location: Left arm, Patient Position: Sitting, Cuff Size: Standard)   Pulse 77   Wt 60.3 kg (133 lb)   SpO2 100%   BMI 25.97 kg/m²      Physical Exam  Vitals and nursing note reviewed.   Constitutional:       General: She is not in acute distress.     Appearance: She is well-developed.   HENT:      Head: Normocephalic and atraumatic.      Right Ear: Tympanic membrane, ear canal and external ear normal. There is no impacted cerumen.      Left Ear: Tympanic membrane, ear canal and external ear normal. There is no impacted cerumen.      Nose: Nasal tenderness, mucosal edema and congestion present.      Right Sinus: Maxillary sinus tenderness present.      Left Sinus: Maxillary sinus tenderness present.      Mouth/Throat:      Mouth: Mucous membranes are moist.      Pharynx: No posterior oropharyngeal erythema.   Cardiovascular:      Rate and Rhythm: Normal rate and regular rhythm.      Heart sounds: Normal heart sounds. No murmur heard.  Pulmonary:      Effort: Pulmonary effort is normal. No respiratory distress.      Breath sounds: Normal breath sounds. No wheezing.   Abdominal:      General: Bowel sounds are normal. There is no distension.      Palpations: Abdomen is soft.      Tenderness: There is no abdominal tenderness.   Musculoskeletal:      Cervical back: Neck supple.      Thoracic back: Spasms and tenderness present. No swelling or signs of trauma.   Skin:     General: Skin is warm and dry.      Capillary Refill: Capillary refill takes less than 2 seconds.      Findings: Lesion (scabbed cold sore left nare) present.   Neurological:      Mental Status: She is alert and oriented to person, place, and time. Mental status is at baseline.    Psychiatric:         Mood and Affect: Mood normal.         Behavior: Behavior normal.       Administrative Statements   I have spent a total time of 30 minutes in caring for this patient on the day of the visit/encounter including Diagnostic results, Risks and benefits of tx options, Instructions for management, Patient and family education, Importance of tx compliance, Risk factor reductions, Impressions, Documenting in the medical record, Reviewing / ordering tests, medicine, procedures  , and Obtaining or reviewing history  .

## 2025-01-31 ENCOUNTER — TELEPHONE (OUTPATIENT)
Dept: FAMILY MEDICINE CLINIC | Facility: CLINIC | Age: 60
End: 2025-01-31

## 2025-01-31 NOTE — TELEPHONE ENCOUNTER
Patient called back in. She does not want to schedule an appt at this time. She states Rae doesn't give her issues with completing the forms. This is an extension of the previous one.     She was also just in 1/28/25.     Please call to discuss. Thank you!    [] : non-antalgic [TWNoteComboBox7] : False [de-identified] : False [de-identified] : False [de-identified] : False

## 2025-01-31 NOTE — TELEPHONE ENCOUNTER
"Forms received, message sent to patient to schedule OV to complete. LMOM for pt to CB to schedule.     Forms in \"to be completed folder\"   "

## 2025-01-31 NOTE — TELEPHONE ENCOUNTER
Patient was returning providers call. Please have provider reach back out to the patient once available.

## 2025-02-03 ENCOUNTER — TELEPHONE (OUTPATIENT)
Dept: FAMILY MEDICINE CLINIC | Facility: CLINIC | Age: 60
End: 2025-02-03

## 2025-05-19 ENCOUNTER — TELEPHONE (OUTPATIENT)
Dept: INFUSION CENTER | Facility: HOSPITAL | Age: 60
End: 2025-05-19

## 2025-05-19 ENCOUNTER — HOSPITAL ENCOUNTER (OUTPATIENT)
Dept: INFUSION CENTER | Facility: HOSPITAL | Age: 60
Discharge: HOME | End: 2025-05-19
Attending: INTERNAL MEDICINE

## 2025-05-19 NOTE — TELEPHONE ENCOUNTER
Patient called in stating that she can not make it to her appointment today 5/19/25 due to work. She did not want to reschedule because she has some vacations coming up. She will call when ready to make appointment for Prolia shot.